# Patient Record
Sex: MALE | Race: WHITE | Employment: OTHER | URBAN - METROPOLITAN AREA
[De-identification: names, ages, dates, MRNs, and addresses within clinical notes are randomized per-mention and may not be internally consistent; named-entity substitution may affect disease eponyms.]

---

## 2017-09-26 ENCOUNTER — OFFICE VISIT (OUTPATIENT)
Dept: FAMILY MEDICINE CLINIC | Age: 67
End: 2017-09-26

## 2017-09-26 ENCOUNTER — HOSPITAL ENCOUNTER (OUTPATIENT)
Dept: LAB | Age: 67
Discharge: HOME | End: 2017-09-26
Payer: MEDICARE

## 2017-09-26 VITALS
BODY MASS INDEX: 25.36 KG/M2 | RESPIRATION RATE: 18 BRPM | WEIGHT: 197.6 LBS | HEIGHT: 74 IN | OXYGEN SATURATION: 97 % | TEMPERATURE: 96.8 F | SYSTOLIC BLOOD PRESSURE: 134 MMHG | HEART RATE: 75 BPM | DIASTOLIC BLOOD PRESSURE: 93 MMHG

## 2017-09-26 DIAGNOSIS — H69.82 EUSTACHIAN TUBE DYSFUNCTION, LEFT: ICD-10-CM

## 2017-09-26 DIAGNOSIS — Z71.89 ACP (ADVANCE CARE PLANNING): ICD-10-CM

## 2017-09-26 DIAGNOSIS — Z13.0 SCREENING FOR ENDOCRINE, METABOLIC AND IMMUNITY DISORDER: ICD-10-CM

## 2017-09-26 DIAGNOSIS — R35.1 NOCTURIA: ICD-10-CM

## 2017-09-26 DIAGNOSIS — Z23 ENCOUNTER FOR IMMUNIZATION: ICD-10-CM

## 2017-09-26 DIAGNOSIS — Z13.228 SCREENING FOR ENDOCRINE, METABOLIC AND IMMUNITY DISORDER: ICD-10-CM

## 2017-09-26 DIAGNOSIS — Z12.11 SCREEN FOR COLON CANCER: ICD-10-CM

## 2017-09-26 DIAGNOSIS — Z13.29 SCREENING FOR ENDOCRINE, METABOLIC AND IMMUNITY DISORDER: ICD-10-CM

## 2017-09-26 DIAGNOSIS — Z00.00 ENCOUNTER FOR MEDICARE ANNUAL WELLNESS EXAM: Primary | ICD-10-CM

## 2017-09-26 DIAGNOSIS — Z11.59 ENCOUNTER FOR HEPATITIS C SCREENING TEST FOR LOW RISK PATIENT: ICD-10-CM

## 2017-09-26 PROCEDURE — 84153 ASSAY OF PSA TOTAL: CPT

## 2017-09-26 PROCEDURE — 86803 HEPATITIS C AB TEST: CPT

## 2017-09-26 PROCEDURE — 36415 COLL VENOUS BLD VENIPUNCTURE: CPT

## 2017-09-26 PROCEDURE — 80061 LIPID PANEL: CPT

## 2017-09-26 PROCEDURE — 85027 COMPLETE CBC AUTOMATED: CPT

## 2017-09-26 PROCEDURE — 80053 COMPREHEN METABOLIC PANEL: CPT

## 2017-09-26 NOTE — PROGRESS NOTES
Angel Bazan is a 75 y.o. male who was seen in clinic today (9/26/2017). Subjective:  Cardiovascular Review:  The patient has no known cardiovascular conditions. Diet and Lifestyle: generally follows a low fat low cholesterol diet, generally follows a low sodium diet, exercises sporadically, smoker cigar once per month  Home BP Monitoring: is not measured at home. Pertinent ROS: taking medications as instructed, no medication side effects noted, no TIA's, no chest pain on exertion, no dyspnea on exertion, no swelling of ankles. Patient currently seen by ENT for left Eustachian tube dysfunction. Underwent left tympanostomy tube in 5/2017 with some relief. Prior to Admission medications    Medication Sig Start Date End Date Taking? Authorizing Provider   CHLORPHENIRAMINE/PHENYLEPHRINE (SINUS AND ALLERGY PE PO) Take  by mouth. Yes Historical Provider   pneumococcal 13 john conj dip (PREVNAR-13) 0.5 mL syrg injection 0.5 mL by IntraMUSCular route once for 1 dose. 9/26/17 9/26/17 Yes Lester Young NP   varicella zoster vacine live (VARICELLA-ZOSTER VACINE LIVE) 19,400 unit/0.65 mL susr injection 1 Vial by SubCUTAneous route once for 1 dose. 9/26/17 9/26/17 Yes Lester Young NP   fluticasone (FLONASE) 50 mcg/actuation nasal spray 2 Sprays by Both Nostrils route daily. 5/3/16  Yes Lester Young NP   guaiFENesin ER (MUCINEX) 600 mg ER tablet Take 600 mg by mouth two (2) times a day. Historical Provider   aspirin delayed-release 81 mg tablet Take 81 mg by mouth daily. Historical Provider          No Known Allergies        Review of Systems   Constitutional: Negative for malaise/fatigue and weight loss. HENT: Negative for hearing loss. Eyes: Negative for blurred vision. Respiratory: Negative for shortness of breath. Cardiovascular: Negative for chest pain, palpitations and leg swelling. Gastrointestinal: Negative for abdominal pain, constipation, diarrhea and heartburn. Genitourinary: Negative for frequency and urgency. Musculoskeletal: Negative for back pain, joint pain and myalgias. Neurological: Negative for dizziness, weakness and headaches. Endo/Heme/Allergies: Does not bruise/bleed easily. Psychiatric/Behavioral: Negative for depression. Objective:   Physical Exam   Constitutional: He is oriented to person, place, and time. He appears well-developed and well-nourished. No distress. HENT:   Right Ear: Tympanic membrane and ear canal normal.   Left Ear: Tympanic membrane normal. A foreign body (tympanostomy tube noted in canal) is present. Nose: No mucosal edema. Right sinus exhibits no maxillary sinus tenderness and no frontal sinus tenderness. Left sinus exhibits no maxillary sinus tenderness and no frontal sinus tenderness. Mouth/Throat: Oropharynx is clear and moist.   Eyes: EOM are normal. Pupils are equal, round, and reactive to light. Neck: Normal range of motion. Neck supple. No JVD present. Carotid bruit is not present. No thyromegaly present. Cardiovascular: Normal rate, regular rhythm, normal heart sounds and intact distal pulses. Exam reveals no gallop and no friction rub. No murmur heard. Pulmonary/Chest: Effort normal and breath sounds normal. No respiratory distress. He has no decreased breath sounds. He has no wheezes. He has no rhonchi. Abdominal: Soft. Bowel sounds are normal. He exhibits no distension. There is no tenderness. Musculoskeletal: He exhibits no edema. Lymphadenopathy:     He has no cervical adenopathy. Neurological: He is alert and oriented to person, place, and time. Psychiatric: He has a normal mood and affect. His behavior is normal.   Nursing note and vitals reviewed.         Visit Vitals    BP (!) 134/93 (BP 1 Location: Left arm, BP Patient Position: Sitting)    Pulse 75    Temp 96.8 °F (36 °C) (Oral)    Resp 18    Ht 6' 2\" (1.88 m)    Wt 197 lb 9.6 oz (89.6 kg)    SpO2 97%    BMI 25.37 kg/m2 Assessment & Plan:  Diagnoses and all orders for this visit:    1. Encounter for Medicare annual wellness exam    2. Eustachian tube dysfunction, left  Managed by ENT, no changes. 3. Screening for endocrine, metabolic and immunity disorder  -     CBC W/O DIFF  -     METABOLIC PANEL, COMPREHENSIVE  -     LIPID PANEL    4. Nocturia  -     PSA W/ REFLX FREE PSA    5. Encounter for hepatitis C screening test for low risk patient  -     HEPATITIS C AB    6. Screen for colon cancer  Discussed need for colonoscopy as a screening for colon cancer. Patient to schedule. 7. Encounter for immunization  -     pneumococcal 13 john conj dip (PREVNAR-13) 0.5 mL syrg injection; 0.5 mL by IntraMUSCular route once for 1 dose.  -     varicella zoster vacine live (VARICELLA-ZOSTER VACINE LIVE) 19,400 unit/0.65 mL susr injection; 1 Vial by SubCUTAneous route once for 1 dose. -     Influenza virus vaccine (FLUZONE HIGH-DOSE) 65 years and older (40165)  -     ADMIN PNEUMOCOCCAL VACCINE  Medicare Injection Admin Charge    8. ACP (advance care planning)  Patient has a living will, recommended adding a copy to medical record      I have discussed the diagnosis with the patient and the intended plan as seen in the above orders. The patient has received an after-visit summary along with patient information handout. I have discussed medication side effects and warnings with the patient as well. Follow-up Disposition:  Return in about 1 year (around 9/26/2018) for Annual Exam - 30 minutes.         Halina Cantu NP

## 2017-09-26 NOTE — PROGRESS NOTES
Jerilynn Dakin is a 75 y.o. male who presents for routine immunizations. He denies any symptoms , reactions or allergies that would exclude them from being immunized today. Risks and adverse reactions were discussed and the VIS was given to them. All questions were addressed. He was observed for 15 min post injection. There were no reactions observed.     Angel Nichols LPN

## 2017-09-26 NOTE — PATIENT INSTRUCTIONS

## 2017-09-26 NOTE — PROGRESS NOTES
Alexis Norman is a 75 y.o. male and presents for annual Medicare Wellness Visit. Problem List: Reviewed with patient and discussed risk factors. Patient Active Problem List   Diagnosis Code    AR (allergic rhinitis) J30.9    Anxiety disorder F41.9    Right inguinal hernia K40.90    ACP (advance care planning) Z71.89       Current medical providers:  Patient Care Team:  Crystal Gardner NP as PCP - General (Nurse Practitioner)    [address]: Reviewed with patient  Past Surgical History:   Procedure Laterality Date    ENDOSCOPY, COLON, DIAGNOSTIC  2007    repeat 2014    HX APPENDECTOMY  1953         HX HERNIA REPAIR  2/85    HX KNEE ARTHROSCOPY  6/02    HX ORTHOPAEDIC  10/75    knee repair        SH: Reviewed with patient  Social History   Substance Use Topics    Smoking status: Former Smoker     Years: 10.00     Types: Cigars    Smokeless tobacco: Never Used      Comment: off and on    Alcohol use Yes       FH: Reviewed with patient  Family History   Problem Relation Age of Onset    Heart Disease Mother      CAD s/p CABG 66's    Cancer Father     Alcohol abuse Father        Medications/Allergies: Reviewed with patient  Current Outpatient Prescriptions on File Prior to Visit   Medication Sig Dispense Refill    fluticasone (FLONASE) 50 mcg/actuation nasal spray 2 Sprays by Both Nostrils route daily. 1 Bottle 11    guaiFENesin ER (MUCINEX) 600 mg ER tablet Take 600 mg by mouth two (2) times a day.  aspirin delayed-release 81 mg tablet Take 81 mg by mouth daily. No current facility-administered medications on file prior to visit. No Known Allergies    Objective:  Visit Vitals    BP (!) 134/93 (BP 1 Location: Left arm, BP Patient Position: Sitting)    Pulse 75    Temp 96.8 °F (36 °C) (Oral)    Resp 18    Ht 6' 2\" (1.88 m)    Wt 197 lb 9.6 oz (89.6 kg)    SpO2 97%    BMI 25.37 kg/m2    Body mass index is 25.37 kg/(m^2).     Assessment of cognitive impairment: Alert and oriented x 3    Depression Screen:   PHQ over the last two weeks 9/26/2017   Little interest or pleasure in doing things Not at all   Feeling down, depressed or hopeless Not at all   Total Score PHQ 2 0       Fall Risk Assessment:    Fall Risk Assessment, last 12 mths 9/26/2017   Able to walk? Yes   Fall in past 12 months? No       Functional Ability:   Does the patient exhibit a steady gait? yes   How long did it take the patient to get up and walk from a sitting position? 1 sec   Is the patient self reliant?  (ie can do own laundry, meals, household chores)  yes     Does the patient handle his/her own medications? yes     Does the patient handle his/her own money? yes     Is the patients home safe (ie good lighting, handrails on stairs and bath, etc.)? yes     Did you notice or did patient express any hearing difficulties? no     Did you notice or did patient express any vision difficulties?   no     Were distance and reading eye charts used?   no       Advance Care Planning:   Patient was offered the opportunity to discuss advance care planning:  yes     Does patient have an Advance Directive:  yes   If no, did you provide information on Caring Connections?  no       Plan:      Orders Placed This Encounter    Influenza virus vaccine (FLUZONE HIGH-DOSE) 65 years and older ([id number])    CBC W/O DIFF    METABOLIC PANEL, COMPREHENSIVE    LIPID PANEL    PSA W/ REFLX FREE PSA    HEPATITIS C AB    ADMIN PNEUMOCOCCAL VACCINE  Medicare Injection Admin Charge    CHLORPHENIRAMINE/PHENYLEPHRINE (SINUS AND ALLERGY PE PO)    pneumococcal 13 john conj dip (PREVNAR-13) 0.5 mL syrg injection    varicella zoster vacine live (VARICELLA-ZOSTER VACINE LIVE) 19,400 unit/0.65 mL susr injection       Health Maintenance   Topic Date Due    Hepatitis C Screening  1950    ZOSTER VACCINE AGE 60>  01/22/2009    GLAUCOMA SCREENING Q2Y  03/22/2014    Pneumococcal 65+ Low/Medium Risk (2 of 2 - PCV13) 10/03/2015    COLONOSCOPY  01/01/2017    MEDICARE YEARLY EXAM  05/04/2017    INFLUENZA AGE 9 TO ADULT  08/01/2017    DTaP/Tdap/Td series (2 - Td) 09/18/2019       *Patient verbalized understanding and agreement with the plan. A copy of the After Visit Summary with personalized health plan was given to the patient today.

## 2017-09-26 NOTE — MR AVS SNAPSHOT
Visit Information Date & Time Provider Department Dept. Phone Encounter #  
 9/26/2017 10:30 AM Washington Lomeli NP [facility] [phone] [id number] Follow-up Instructions Return in about 1 year (around 9/26/2018) for Annual Exam - 30 minutes. Upcoming Health Maintenance Date Due Hepatitis C Screening 1950 ZOSTER VACCINE AGE 60> 1/22/2009 GLAUCOMA SCREENING Q2Y 3/22/2014 Pneumococcal 65+ Low/Medium Risk (2 of 2 - PCV13) 10/3/2015 COLONOSCOPY 1/1/2017 MEDICARE YEARLY EXAM 5/4/2017 INFLUENZA AGE 9 TO ADULT 8/1/2017 DTaP/Tdap/Td series (2 - Td) 9/18/2019 Allergies as of 9/26/2017  Review Complete On: 9/26/2017 By: Washington Lomeli NP No Known Allergies Current Immunizations  Never Reviewed Name Date Pneumococcal Polysaccharide (PPSV-23) 10/3/2014 TDAP Vaccine 9/18/2009 Not reviewed this visit You Were Diagnosed With   
  
 Codes Comments Encounter for Medicare annual wellness exam    -  Primary ICD-10-CM: Z00.00 ICD-9-CM: V70.0 Eustachian tube dysfunction, left     ICD-10-CM: B79.65 ICD-9-CM: 381.81 Screening for endocrine, metabolic and immunity disorder     ICD-10-CM: Z13.29, Z13.228, Z13.0 ICD-9-CM: V77.99 Nocturia     ICD-10-CM: R35.1 ICD-9-CM: 788.43 Vitals BP Pulse Temp Resp Height(growth percentile) Weight(growth percentile) (!) 134/93 (BP 1 Location: Left arm, BP Patient Position: Sitting) 75 96.8 °F (36 °C) (Oral) 18 6' 2\" (1.88 m) 197 lb 9.6 oz (89.6 kg) SpO2 BMI Smoking Status 97% 25.37 kg/m2 Former Smoker Vitals History BMI and BSA Data Body Mass Index Body Surface Area  
 25.37 kg/m 2 2.16 m 2 Preferred Pharmacy Pharmacy Name Phone [id number] [place], [address] [facility] [phone] Your Updated Medication List  
  
   
 This list is accurate as of: 9/26/17 11:41 AM.  Always use your most recent med list.  
  
  
  
  
 aspirin delayed-release 81 mg tablet Take 81 mg by mouth daily. fluticasone 50 mcg/actuation nasal spray Commonly known as:  Wenda Maze 2 Sprays by Both Nostrils route daily. guaiFENesin  mg ER tablet Commonly known as:  Claude & Claude Take 600 mg by mouth two (2) times a day. SINUS AND ALLERGY PE PO Take  by mouth. We Performed the Following CBC W/O DIFF [89040 CPT(R)] LIPID PANEL [23525 CPT(R)] METABOLIC PANEL, COMPREHENSIVE [33013 CPT(R)] PSA W/ REFLX FREE PSA [87980 CPT(R)] Follow-up Instructions Return in about 1 year (around 9/26/2018) for Annual Exam - 30 minutes. Patient Instructions Well Visit, Over 72: Care Instructions Your Care Instructions Physical exams can help you stay healthy. Your doctor has checked your overall health and may have suggested ways to take good care of yourself. He or she also may have recommended tests. At home, you can help prevent illness with healthy eating, regular exercise, and other steps. Follow-up care is a key part of your treatment and safety. Be sure to make and go to all appointments, and call your doctor if you are having problems. It's also a good idea to know your test results and keep a list of the medicines you take. How can you care for yourself at home? · Reach and stay at a healthy weight. This will lower your risk for many problems, such as obesity, diabetes, heart disease, and high blood pressure. · Get at least 30 minutes of exercise on most days of the week. Walking is a good choice. You also may want to do other activities, such as running, swimming, cycling, or playing tennis or team sports. · Do not smoke. Smoking can make health problems worse. If you need help quitting, talk to your doctor about stop-smoking programs and medicines. These can increase your chances of quitting for good. · Protect your skin from too much sun. When you're outdoors from 10 a.m. to 4 p.m., stay in the shade or cover up with clothing and a hat with a wide brim. Wear sunglasses that block UV rays. Even when it's cloudy, put broad-spectrum sunscreen (SPF 30 or higher) on any exposed skin. · See a dentist one or two times a year for checkups and to have your teeth cleaned. · Wear a seat belt in the car. · Limit alcohol to 2 drinks a day for men and 1 drink a day for women. Too much alcohol can cause health problems. Follow your doctor's advice about when to have certain tests. These tests can spot problems early. For men and women · Cholesterol. Your doctor will tell you how often to have this done based on your overall health and other things that can increase your risk for heart attack and stroke. · Blood pressure. Have your blood pressure checked during a routine doctor visit. Your doctor will tell you how often to check your blood pressure based on your age, your blood pressure results, and other factors. · Diabetes. Ask your doctor whether you should have tests for diabetes. · Vision. Experts recommend that you have yearly exams for glaucoma and other age-related eye problems. · Hearing. Tell your doctor if you notice any change in your hearing. You can have tests to find out how well you hear. · Colon cancer tests. Keep having colon cancer tests as your doctor recommends. You can have one of several types of tests. · Heart attack and stroke risk. At least every 4 to 6 years, you should have your risk for heart attack and stroke assessed. Your doctor uses factors such as your age, blood pressure, cholesterol, and whether you smoke or have diabetes to show what your risk for a heart attack or stroke is over the next 10 years. · Osteoporosis.  Talk to your doctor about whether you should have a bone density test to find out whether you have thinning bones. Also ask your doctor about whether you should take calcium and vitamin D supplements. For women · Pap test and pelvic exam. You may no longer need a Pap test. Talk with your doctor about whether to stop or continue to have Pap tests. · Breast exam and mammogram. Ask how often you should have a mammogram, which is an X-ray of your breasts. A mammogram can spot breast cancer before it can be felt and when it is easiest to treat. · Thyroid disease. Talk to your doctor about whether to have your thyroid checked as part of a regular physical exam. Women have an increased chance of a thyroid problem. For men · Prostate exam. Talk to your doctor about whether you should have a blood test (called a PSA test) for prostate cancer. Experts disagree on whether men should have this test. Some experts recommend that you discuss the benefits and risks of the test with your doctor. · Abdominal aortic aneurysm. Ask your doctor whether you should have a test to check for an aneurysm. You may need a test if you ever smoked or if your parent, brother, sister, or child has had an aneurysm. When should you call for help? Watch closely for changes in your health, and be sure to contact your doctor if you have any problems or symptoms that concern you. Where can you learn more? Go to http://allie-natalya.info/. Enter W150 in the search box to learn more about \"Well Visit, Over 65: Care Instructions. \" Current as of: July 19, 2016 Content Version: 11.3 © 1564-8681 [employer]. Care instructions adapted under license by [employer] (which disclaims liability or warranty for this information). If you have questions about a medical condition or this instruction, always ask your healthcare professional. [place] [address] any warranty or liability for your use of this information. Introducing [facility]! Dear Pedro Dejesus: Thank you for requesting a TV[employer] account. Our records indicate that you have previously registered for a TVTY account but its currently inactive. Please call our TVTY support line at [phone]. Additional Information If you have questions, please visit the Frequently Asked Questions section of the TV[employer] website at [email or web address] Remember, TVTY is NOT to be used for urgent needs. For medical emergencies, dial 911. Now available from your iPhone and Android! Please provide this summary of care documentation to your next provider. Your primary care clinician is listed as Jenn Richardson. If you have any questions after today's visit, please call [phone].

## 2017-09-27 LAB
ALBUMIN SERPL-MCNC: 4.8 G/DL (ref 3.6–4.8)
ALBUMIN/GLOB SERPL: 2.1 {RATIO} (ref 1.2–2.2)
ALP SERPL-CCNC: 70 IU/L (ref 39–117)
ALT SERPL-CCNC: 19 IU/L (ref 0–44)
AST SERPL-CCNC: 26 IU/L (ref 0–40)
BILIRUB SERPL-MCNC: 0.7 MG/DL (ref 0–1.2)
BUN SERPL-MCNC: 16 MG/DL (ref 8–27)
BUN/CREAT SERPL: 15 (ref 10–24)
CALCIUM SERPL-MCNC: 9.5 MG/DL (ref 8.6–10.2)
CHLORIDE SERPL-SCNC: 104 MMOL/L (ref 96–106)
CHOLEST SERPL-MCNC: 176 MG/DL (ref 100–199)
CO2 SERPL-SCNC: 25 MMOL/L (ref 18–29)
CREAT SERPL-MCNC: 1.07 MG/DL (ref 0.76–1.27)
ERYTHROCYTE [DISTWIDTH] IN BLOOD BY AUTOMATED COUNT: 14.2 % (ref 12.3–15.4)
GLOBULIN SER CALC-MCNC: 2.3 G/DL (ref 1.5–4.5)
GLUCOSE SERPL-MCNC: 89 MG/DL (ref 65–99)
HCT VFR BLD AUTO: 45.5 % (ref 37.5–51)
HCV AB S/CO SERPL IA: <0.1 S/CO RATIO (ref 0–0.9)
HDLC SERPL-MCNC: 60 MG/DL
HGB BLD-MCNC: 15.7 G/DL (ref 12.6–17.7)
INTERPRETATION, 910389: NORMAL
LDLC SERPL CALC-MCNC: 97 MG/DL (ref 0–99)
MCH RBC QN AUTO: 32 PG (ref 26.6–33)
MCHC RBC AUTO-ENTMCNC: 34.5 G/DL (ref 31.5–35.7)
MCV RBC AUTO: 93 FL (ref 79–97)
PLATELET # BLD AUTO: 260 X10E3/UL (ref 150–379)
POTASSIUM SERPL-SCNC: 4.8 MMOL/L (ref 3.5–5.2)
PROT SERPL-MCNC: 7.1 G/DL (ref 6–8.5)
PSA SERPL-MCNC: 3.2 NG/ML (ref 0–4)
RBC # BLD AUTO: 4.91 X10E6/UL (ref 4.14–5.8)
REFLEX CRITERIA: NORMAL
SODIUM SERPL-SCNC: 143 MMOL/L (ref 134–144)
TRIGL SERPL-MCNC: 94 MG/DL (ref 0–149)
VLDLC SERPL CALC-MCNC: 19 MG/DL (ref 5–40)
WBC # BLD AUTO: 8.3 X10E3/UL (ref 3.4–10.8)

## 2017-11-14 ENCOUNTER — HOSPITAL ENCOUNTER (OUTPATIENT)
Dept: MRI IMAGING | Age: 67
Discharge: HOME | End: 2017-11-14
Attending: SPECIALIST
Payer: MEDICARE

## 2017-11-14 VITALS — WEIGHT: 190 LBS | BODY MASS INDEX: 24.39 KG/M2

## 2017-11-14 DIAGNOSIS — H68.122: ICD-10-CM

## 2017-11-14 DIAGNOSIS — G51.0 BELL'S PALSY: ICD-10-CM

## 2017-11-14 DIAGNOSIS — H90.3 SENSORY HEARING LOSS, BILATERAL: ICD-10-CM

## 2017-11-14 PROCEDURE — 70553 MRI BRAIN STEM W/O & W/DYE: CPT

## 2017-11-14 PROCEDURE — A9576 INJ PROHANCE MULTIPACK: HCPCS | Performed by: SPECIALIST

## 2017-11-14 PROCEDURE — 74011250636 HC RX REV CODE- 250/636: Performed by: SPECIALIST

## 2017-11-14 RX ADMIN — GADOTERIDOL 18 ML: 279.3 INJECTION, SOLUTION INTRAVENOUS at 16:00

## 2018-07-31 ENCOUNTER — OFFICE VISIT (OUTPATIENT)
Dept: FAMILY MEDICINE CLINIC | Age: 68
End: 2018-07-31

## 2018-07-31 VITALS
WEIGHT: 190.6 LBS | SYSTOLIC BLOOD PRESSURE: 136 MMHG | HEIGHT: 74 IN | OXYGEN SATURATION: 98 % | RESPIRATION RATE: 18 BRPM | BODY MASS INDEX: 24.46 KG/M2 | TEMPERATURE: 98.5 F | HEART RATE: 84 BPM | DIASTOLIC BLOOD PRESSURE: 40 MMHG

## 2018-07-31 DIAGNOSIS — R33.9 INCOMPLETE BLADDER EMPTYING: Primary | ICD-10-CM

## 2018-07-31 LAB
BILIRUB UR QL STRIP: NEGATIVE
GLUCOSE UR-MCNC: NEGATIVE MG/DL
KETONES P FAST UR STRIP-MCNC: NEGATIVE MG/DL
PH UR STRIP: 7 [PH] (ref 4.6–8)
PROT UR QL STRIP: NEGATIVE
SP GR UR STRIP: 1.02 (ref 1–1.03)
UA UROBILINOGEN AMB POC: NORMAL (ref 0.2–1)
URINALYSIS CLARITY POC: CLEAR
URINALYSIS COLOR POC: YELLOW
URINE BLOOD POC: NEGATIVE
URINE LEUKOCYTES POC: NEGATIVE
URINE NITRITES POC: NEGATIVE

## 2018-07-31 RX ORDER — TAMSULOSIN HYDROCHLORIDE 0.4 MG/1
0.4 CAPSULE ORAL DAILY
Qty: 14 CAP | Refills: 0 | Status: SHIPPED | OUTPATIENT
Start: 2018-07-31 | End: 2018-08-06 | Stop reason: SDUPTHER

## 2018-07-31 RX ORDER — DIAZEPAM 5 MG/1
TABLET ORAL
Refills: 0 | COMMUNITY
Start: 2018-05-31 | End: 2018-07-31

## 2018-07-31 NOTE — MR AVS SNAPSHOT
[facility] 
 
 
 [address] 
[phone] Patient: Justina Harris MRN: [id number] FSD:2[birth year] Visit Information Date & Time Provider Department Dept. Phone Encounter #  
 7/31/2018  6:45 PM [facility] [place]  [place] [phone] [id number] Follow-up Instructions Return if symptoms worsen or fail to improve. Upcoming Health Maintenance Date Due ZOSTER VACCINE AGE 60> 1/22/2009 GLAUCOMA SCREENING Q2Y 3/22/2014 Pneumococcal 65+ Low/Medium Risk (2 of 2 - PCV13) 10/3/2015 COLONOSCOPY 1/1/2017 Influenza Age 5 to Adult 8/1/2018 MEDICARE YEARLY EXAM 9/27/2018 DTaP/Tdap/Td series (2 - Td) 9/18/2019 Allergies as of 7/31/2018  Review Complete On: 7/31/2018 By: Clinton Wright No Known Allergies Current Immunizations  Never Reviewed Name Date Influenza High Dose Vaccine PF 9/26/2017 Pneumococcal Polysaccharide (PPSV-23) 10/3/2014 TDAP Vaccine 9/18/2009 Not reviewed this visit You Were Diagnosed With   
  
 Codes Comments Incomplete bladder emptying    -  Primary ICD-10-CM: R33.9 ICD-9-CM: 788.21 Vitals BP Pulse Temp Resp Height(growth percentile) Weight(growth percentile) 136/40 (BP 1 Location: Left arm, BP Patient Position: Sitting) 84 98.5 °F (36.9 °C) (Oral) 18 6' 2\" (1.88 m) 190 lb 9.6 oz (86.5 kg) SpO2 BMI Smoking Status 98% 24.47 kg/m2 Former Smoker Vitals History BMI and BSA Data Body Mass Index Body Surface Area  
 24.47 kg/m 2 2.13 m 2 Preferred Pharmacy Pharmacy Name Phone [id number], [address] [facility] [phone] Your Updated Medication List  
  
   
This list is accurate as of 7/31/18  7:02 PM.  Always use your most recent med list.  
  
  
  
  
 aspirin delayed-release 81 mg tablet Take 81 mg by mouth daily. fluticasone 50 mcg/actuation nasal spray Commonly known as:  Leslie Gut 2 Sprays by Both Nostrils route daily. SINUS AND ALLERGY PE PO Take  by mouth. tamsulosin 0.4 mg capsule Commonly known as:  FLOMAX Take 1 Cap by mouth daily. Prescriptions Sent to Pharmacy Refills  
 tamsulosin (FLOMAX) 0.4 mg capsule 0 Sig: Take 1 Cap by mouth daily. Class: Normal  
 Pharmacy: [facility]/pharmacy #[id number] #: [phone] Route: Oral  
  
We Performed the Following AMB POC URINALYSIS DIP STICK MANUAL W/O MICRO [73645 CPT(R)] CULTURE, URINE [id number] CPT(R)] Follow-up Instructions Return if symptoms worsen or fail to improve. Patient Instructions Urinary Retention: Care Instructions Your Care Instructions Urinary retention means that you aren't able to urinate. In men, it is often caused by a blockage of the urinary tract from an enlarged prostate gland. In men and women, it can also be caused by an infection or nerve damage. Or it may be a side effect of a medicine. The doctor may have drained the urine from your bladder. If you still have problems passing urine, you may need to use a catheter at home. This is used to empty your bladder until the problem can be fixed. Your doctor may put a catheter in your bladder before you go home. If so, he or she will tell you when to come back to have the catheter removed. The doctor has checked you closely. But problems can develop later. If you notice any problems or new symptoms, get medical treatment right away. Follow-up care is a key part of your treatment and safety. Be sure to make and go to all appointments, and call your doctor if you are having problems. It's also a good idea to know your test results and keep a list of the medicines you take. How can you care for yourself at home? · Take your medicines exactly as prescribed. Call your doctor if you think you are having a problem with your medicine. You will get more details on the specific medicines your doctor prescribes. · Check with your doctor before you use any over-the-counter medicines. Many cold and allergy medicines, for example, can make this problem worse. Make sure your doctor knows all of the medicines, vitamins, supplements, and herbal remedies you take. · Spread out through the day the amount of fluid you drink. Do not drink a lot at bedtime. · Avoid alcohol and caffeine. · If you have been given a catheter, or if one is already in place, follow the instructions you were given. Always wash your hands before and after you handle the catheter. When should you call for help? Call your doctor now or seek immediate medical care if: 
  · You cannot urinate at all, or it is getting harder to urinate.  
  · You have symptoms of a urinary tract infection. These may include: 
¨ Pain or burning when you urinate. ¨ A frequent need to urinate without being able to pass much urine. ¨ Pain in the flank, which is just below the rib cage and above the waist on either side of the back. ¨ Blood in your urine. ¨ A fever.  
 Watch closely for changes in your health, and be sure to contact your doctor if: 
  · You have any problems with your catheter.  
  · You do not get better as expected. Where can you learn more? Go to http://allie-natalya.info/. Enter M244 in the search box to learn more about \"Urinary Retention: Care Instructions. \" Current as of: May 12, 2017 Content Version: 11.7 © 4455-8960 [employer]. Care instructions adapted under license by "[employer]" (which disclaims liability or warranty for this information).  If you have questions about a medical condition or this instruction, always ask your healthcare professional. Mariela Conce[employer] disclaims any warranty or liability for your use of this information. Introducing [facility]! [employer] introduces Makstr patient portal. Now you can access parts of your medical record, email your doctor's office, and request medication refills online. 1. In your internet browser, go to [email or web address] 2. Click on the First Time User? Click Here link in the Sign In box. You will see the New Member Sign Up page. 3. Enter your Calleoot Access Code exactly as it appears below. You will not need to use this code after youve completed the sign-up process. If you do not sign up before the expiration date, you must request a new code. · Makstr Access Code: NOEL Savage RAFAEL Expires: 10/29/2018  6:59 PM 
 
4. Enter the last four digits of your Social Security Number (xxxx) and Date of Birth (mm/dd/yyyy) as indicated and click Submit. You will be taken to the next sign-up page. 5. Create a Makstr ID. This will be your Makstr login ID and cannot be changed, so think of one that is secure and easy to remember. 6. Create a Makstr password. You can change your password at any time. 7. Enter your Password Reset Question and Answer. This can be used at a later time if you forget your password. 8. Enter your e-mail address. You will receive e-mail notification when new information is available in [address] 9. Click Sign Up. You can now view and download portions of your medical record. 10. Click the Download Summary menu link to download a portable copy of your medical information. If you have questions, please visit the Frequently Asked Questions section of the Makstr website. Remember, Makstr is NOT to be used for urgent needs. For medical emergencies, dial 911. Now available from your iPhone and Android! Please provide this summary of care documentation to your next provider. Your primary care clinician is listed as Basilio Healy. If you have any questions after today's visit, please call [phone].

## 2018-07-31 NOTE — PROGRESS NOTES
Patient Name: Chriss Sandhoff   MRN: [id number]    Marnie Cabezas is a 70 y.o. male who presents with the following:     Patient states that since this morning, he has had incomplete bladder emptying, urinary frequency, urgency and a weak stream.  Prior to today, he states he is always had a strong urinary stream and will urinate about 3 or 4 times a day. Is a  therefore does not drink a lot of water. Does take an over-the-counter antihistamine for allergies but denies any recent changes. Denies history of kidney stones, UTIs, pain, fever, nausea, vomiting, stomach pain. He is concerned as his father-in-law many years ago required to go to the emergency room for Arreaga catheter placement after prolonged period of urinary retention. Had his prostate checked with normal rectal exam and PSA in September 2017. Has never had prostate issues. Review of Systems   Constitutional: Negative for fever, malaise/fatigue and weight loss. Respiratory: Negative for cough, hemoptysis, shortness of breath and wheezing. Cardiovascular: Negative for chest pain, palpitations, leg swelling and PND. Gastrointestinal: Negative for abdominal pain, constipation, diarrhea, nausea and vomiting. Genitourinary: Positive for frequency and urgency. Negative for dysuria, flank pain and hematuria. The patient's medications, allergies, past medical history, surgical history, family history and social history were reviewed and updated where appropriate. Prior to Admission medications    Medication Sig Start Date End Date Taking? Authorizing Provider   CHLORPHENIRAMINE/PHENYLEPHRINE (SINUS AND ALLERGY PE PO) Take  by mouth. Yes Historical Provider   fluticasone (FLONASE) 50 mcg/actuation nasal spray 2 Sprays by Both Nostrils route daily. 5/3/16  Yes Caitlyn Miranda NP   aspirin delayed-release 81 mg tablet Take 81 mg by mouth daily.    Yes Historical Provider       No Known Allergies        OBJECTIVE    Visit Vitals    /40 (BP 1 Location: Left arm, BP Patient Position: Sitting)    Pulse 84    Temp 98.5 °F (36.9 °C) (Oral)    Resp 18    Ht 6' 2\" (1.88 m)    Wt 190 lb 9.6 oz (86.5 kg)    SpO2 98%    BMI 24.47 kg/m2       Physical Exam   Constitutional: He is oriented to person, place, and time and well-developed, well-nourished, and in no distress. No distress. Eyes: Conjunctivae and EOM are normal. Pupils are equal, round, and reactive to light. Cardiovascular: Normal rate, regular rhythm and normal heart sounds. Exam reveals no gallop and no friction rub. No murmur heard. Pulmonary/Chest: Effort normal and breath sounds normal. No respiratory distress. He has no wheezes. Abdominal: Soft. Bowel sounds are normal. He exhibits no distension and no mass. There is no tenderness. There is no rebound and no guarding. Neurological: He is alert and oriented to person, place, and time. Skin: Skin is warm and dry. No rash noted. He is not diaphoretic. Psychiatric: Mood, memory, affect and judgment normal.   Nursing note and vitals reviewed. ASSESSMENT AND PLAN  Judy Sellers is a 70 y.o. male who presents today for:    1. Incomplete bladder emptying  UA is within normal limits. Suspect possible bladder infection but will obtain urine culture to confirm. Recommend patient to stop antihistamine as this may further worsen urinary retention. May trial Flomax to see if it helps with urinary stream.  Discussed that if he goes for prolonged period of time without any urinary emptying, he should proceed to the ER for urgent Arreaga catheter placement. Reviewed signs and symptoms that would indicate a worsening medical condition which would require immediate evaluation and treatment; patient expressed understanding of plan. - CULTURE, URINE  - AMB POC URINALYSIS DIP STICK MANUAL W/O MICRO  - tamsulosin (FLOMAX) 0.4 mg capsule; Take 1 Cap by mouth daily. Dispense: 14 Cap; Refill: 0       Medications Discontinued During This Encounter   Medication Reason    diazePAM (VALIUM) 5 mg tablet Not A Current Medication    guaiFENesin ER (MUCINEX) 600 mg ER tablet Not A Current Medication       Follow-up Disposition:  Return if symptoms worsen or fail to improve. Medication risks/benefits/costs/interactions/alternatives discussed with patient. Advised patient to call back or return to office if symptoms worsen/change/persist. If patient cannot reach us or should anything more severe/urgent arise he/she should proceed directly to the nearest emergency department. Discussed expected course/resolution/complications of diagnosis in detail with patient. Patient given a written after visit summary which includes his/her diagnoses, current medications and vitals. Patient expressed understanding with the diagnosis and plan.      Floyd Bush M.D.

## 2018-07-31 NOTE — PROGRESS NOTES
Chief Complaint   Patient presents with    Incomplete Bladder Emptying     since this morning       1. Have you been to the ER, urgent care clinic since your last visit? Hospitalized since your last visit? No    2. Have you seen or consulted any other health care providers outside of the [address] [place] [address] since your last visit? Include any pap smears or colon screening.  No

## 2018-07-31 NOTE — PATIENT INSTRUCTIONS
Urinary Retention: Care Instructions  Your Care Instructions    Urinary retention means that you aren't able to urinate. In men, it is often caused by a blockage of the urinary tract from an enlarged prostate gland. In men and women, it can also be caused by an infection or nerve damage. Or it may be a side effect of a medicine. The doctor may have drained the urine from your bladder. If you still have problems passing urine, you may need to use a catheter at home. This is used to empty your bladder until the problem can be fixed. Your doctor may put a catheter in your bladder before you go home. If so, he or she will tell you when to come back to have the catheter removed. The doctor has checked you closely. But problems can develop later. If you notice any problems or new symptoms, get medical treatment right away. Follow-up care is a key part of your treatment and safety. Be sure to make and go to all appointments, and call your doctor if you are having problems. It's also a good idea to know your test results and keep a list of the medicines you take. How can you care for yourself at home? · Take your medicines exactly as prescribed. Call your doctor if you think you are having a problem with your medicine. You will get more details on the specific medicines your doctor prescribes. · Check with your doctor before you use any over-the-counter medicines. Many cold and allergy medicines, for example, can make this problem worse. Make sure your doctor knows all of the medicines, vitamins, supplements, and herbal remedies you take. · Spread out through the day the amount of fluid you drink. Do not drink a lot at bedtime. · Avoid alcohol and caffeine. · If you have been given a catheter, or if one is already in place, follow the instructions you were given. Always wash your hands before and after you handle the catheter. When should you call for help?   Call your doctor now or seek immediate medical care if:    · You cannot urinate at all, or it is getting harder to urinate.     · You have symptoms of a urinary tract infection. These may include:  ¨ Pain or burning when you urinate. ¨ A frequent need to urinate without being able to pass much urine. ¨ Pain in the flank, which is just below the rib cage and above the waist on either side of the back. ¨ Blood in your urine. ¨ A fever.    Watch closely for changes in your health, and be sure to contact your doctor if:    · You have any problems with your catheter.     · You do not get better as expected. Where can you learn more? Go to http://allie-natalya.info/. Enter M244 in the search box to learn more about \"Urinary Retention: Care Instructions. \"  Current as of: May 12, 2017  Content Version: 11.7  © 3695-1785 [employer]. Care instructions adapted under license by [employer] (which disclaims liability or warranty for this information). If you have questions about a medical condition or this instruction, always ask your healthcare professional. [place] [address] any warranty or liability for your use of this information.

## 2018-08-01 ENCOUNTER — HOSPITAL ENCOUNTER (OUTPATIENT)
Dept: LAB | Age: 68
Discharge: HOME | End: 2018-08-01
Payer: MEDICARE

## 2018-08-01 PROCEDURE — 87086 URINE CULTURE/COLONY COUNT: CPT

## 2018-08-02 ENCOUNTER — TELEPHONE (OUTPATIENT)
Dept: FAMILY MEDICINE CLINIC | Age: 68
End: 2018-08-02

## 2018-08-02 LAB — BACTERIA UR CULT: NO GROWTH

## 2018-08-02 NOTE — TELEPHONE ENCOUNTER
Call to patient.  verified. Patient informed me he feels a lot better. His symptoms are decreasing after taking the prescribed medication. Informed him we will call him once his urine culture comes back.   He states he will come in to have his prostate check

## 2018-08-02 NOTE — PROGRESS NOTES
Please notify patient regarding their test results:    Urine culture is negative for infection. Pt to continue with the Flomax as he may have signs of an enlarged prostate. Stop antihistamines as discussed. Pt is welcome to follow up with PCP and/or urology to discuss further. Would recommend continue with Flomax for now and monitor for any worsening signs/symptoms.

## 2018-08-02 NOTE — TELEPHONE ENCOUNTER
----- Message from Aris Root sent at 8/1/2018  5:24 PM EDT -----  Regarding: Dr. Michael Langford: [phone]  Patient is requesting to speak with Dr. Maranda Do in regards to his appointment from yesterday. Patient stated that he has been urinating more frequently and experiencing an increase in urges to urinate. In addition, patient stated that he has been experiencing a bit of discomfort while using the bathroom.

## 2018-08-03 NOTE — PROGRESS NOTES
Called patient informed him of the following. Urine culture is negative for infection. Pt to continue with the Flomax as he may have signs of an enlarged prostate. Stop antihistamines as discussed. Pt is welcome to follow up with PCP and/or urology to discuss further. Would recommend continue with Flomax for now and monitor for any worsening signs/symptoms. Patient will follow up with PCP.

## 2018-08-05 ENCOUNTER — HOSPITAL ENCOUNTER (EMERGENCY)
Age: 68
Discharge: HOME | End: 2018-08-05
Attending: EMERGENCY MEDICINE
Payer: MEDICARE

## 2018-08-05 VITALS
SYSTOLIC BLOOD PRESSURE: 125 MMHG | HEART RATE: 78 BPM | WEIGHT: 190 LBS | TEMPERATURE: 97.9 F | DIASTOLIC BLOOD PRESSURE: 75 MMHG | OXYGEN SATURATION: 95 % | BODY MASS INDEX: 25.18 KG/M2 | RESPIRATION RATE: 22 BRPM | HEIGHT: 73 IN

## 2018-08-05 DIAGNOSIS — R55 VASOVAGAL SYNCOPE: Primary | ICD-10-CM

## 2018-08-05 LAB
ALBUMIN SERPL-MCNC: 3.7 G/DL (ref 3.5–5)
ALBUMIN/GLOB SERPL: 1.3 {RATIO} (ref 1.1–2.2)
ALP SERPL-CCNC: 68 U/L (ref 45–117)
ALT SERPL-CCNC: 23 U/L (ref 12–78)
ANION GAP SERPL CALC-SCNC: 5 MMOL/L (ref 5–15)
APPEARANCE UR: CLEAR
AST SERPL-CCNC: 19 U/L (ref 15–37)
ATRIAL RATE: 84 BPM
BACTERIA URNS QL MICRO: NEGATIVE /HPF
BASOPHILS # BLD: 0.1 K/UL (ref 0–0.1)
BASOPHILS NFR BLD: 1 % (ref 0–1)
BILIRUB SERPL-MCNC: 0.5 MG/DL (ref 0.2–1)
BILIRUB UR QL: NEGATIVE
BUN SERPL-MCNC: 20 MG/DL (ref 6–20)
BUN/CREAT SERPL: 18 (ref 12–20)
CALCIUM SERPL-MCNC: 8.7 MG/DL (ref 8.5–10.1)
CALCULATED P AXIS, ECG09: 22 DEGREES
CALCULATED R AXIS, ECG10: -8 DEGREES
CALCULATED T AXIS, ECG11: 9 DEGREES
CHLORIDE SERPL-SCNC: 109 MMOL/L (ref 97–108)
CO2 SERPL-SCNC: 26 MMOL/L (ref 21–32)
COLOR UR: NORMAL
CREAT SERPL-MCNC: 1.09 MG/DL (ref 0.7–1.3)
DIAGNOSIS, 93000: NORMAL
DIFFERENTIAL METHOD BLD: ABNORMAL
EOSINOPHIL # BLD: 0.2 K/UL (ref 0–0.4)
EOSINOPHIL NFR BLD: 2 % (ref 0–7)
EPITH CASTS URNS QL MICRO: NORMAL /LPF
ERYTHROCYTE [DISTWIDTH] IN BLOOD BY AUTOMATED COUNT: 13.2 % (ref 11.5–14.5)
GLOBULIN SER CALC-MCNC: 2.9 G/DL (ref 2–4)
GLUCOSE SERPL-MCNC: 116 MG/DL (ref 65–100)
GLUCOSE UR STRIP.AUTO-MCNC: NEGATIVE MG/DL
HCT VFR BLD AUTO: 42.7 % (ref 36.6–50.3)
HGB BLD-MCNC: 14.3 G/DL (ref 12.1–17)
HGB UR QL STRIP: NEGATIVE
IMM GRANULOCYTES # BLD: 0 K/UL (ref 0–0.04)
IMM GRANULOCYTES NFR BLD AUTO: 1 % (ref 0–0.5)
KETONES UR QL STRIP.AUTO: NEGATIVE MG/DL
LEUKOCYTE ESTERASE UR QL STRIP.AUTO: NEGATIVE
LYMPHOCYTES # BLD: 2.1 K/UL (ref 0.8–3.5)
LYMPHOCYTES NFR BLD: 26 % (ref 12–49)
MCH RBC QN AUTO: 31.8 PG (ref 26–34)
MCHC RBC AUTO-ENTMCNC: 33.5 G/DL (ref 30–36.5)
MCV RBC AUTO: 95.1 FL (ref 80–99)
MONOCYTES # BLD: 0.7 K/UL (ref 0–1)
MONOCYTES NFR BLD: 9 % (ref 5–13)
NEUTS SEG # BLD: 4.8 K/UL (ref 1.8–8)
NEUTS SEG NFR BLD: 61 % (ref 32–75)
NITRITE UR QL STRIP.AUTO: NEGATIVE
NRBC # BLD: 0 K/UL (ref 0–0.01)
NRBC BLD-RTO: 0 PER 100 WBC
P-R INTERVAL, ECG05: 164 MS
PH UR STRIP: 5.5 [PH] (ref 5–8)
PLATELET # BLD AUTO: 214 K/UL (ref 150–400)
PMV BLD AUTO: 10.6 FL (ref 8.9–12.9)
POTASSIUM SERPL-SCNC: 3.6 MMOL/L (ref 3.5–5.1)
PROT SERPL-MCNC: 6.6 G/DL (ref 6.4–8.2)
PROT UR STRIP-MCNC: NEGATIVE MG/DL
Q-T INTERVAL, ECG07: 364 MS
QRS DURATION, ECG06: 88 MS
QTC CALCULATION (BEZET), ECG08: 430 MS
RBC # BLD AUTO: 4.49 M/UL (ref 4.1–5.7)
RBC #/AREA URNS HPF: NORMAL /HPF (ref 0–5)
SODIUM SERPL-SCNC: 140 MMOL/L (ref 136–145)
SP GR UR REFRACTOMETRY: 1.02 (ref 1–1.03)
TROPONIN I SERPL-MCNC: <0.05 NG/ML
UR CULT HOLD, URHOLD: NORMAL
UROBILINOGEN UR QL STRIP.AUTO: 0.2 EU/DL (ref 0.2–1)
VENTRICULAR RATE, ECG03: 84 BPM
WBC # BLD AUTO: 7.9 K/UL (ref 4.1–11.1)
WBC URNS QL MICRO: NORMAL /HPF (ref 0–4)

## 2018-08-05 PROCEDURE — 81001 URINALYSIS AUTO W/SCOPE: CPT | Performed by: EMERGENCY MEDICINE

## 2018-08-05 PROCEDURE — 93005 ELECTROCARDIOGRAM TRACING: CPT

## 2018-08-05 PROCEDURE — 36415 COLL VENOUS BLD VENIPUNCTURE: CPT | Performed by: EMERGENCY MEDICINE

## 2018-08-05 PROCEDURE — 99285 EMERGENCY DEPT VISIT HI MDM: CPT

## 2018-08-05 PROCEDURE — 84484 ASSAY OF TROPONIN QUANT: CPT | Performed by: EMERGENCY MEDICINE

## 2018-08-05 PROCEDURE — 80053 COMPREHEN METABOLIC PANEL: CPT | Performed by: EMERGENCY MEDICINE

## 2018-08-05 PROCEDURE — 85025 COMPLETE CBC W/AUTO DIFF WBC: CPT | Performed by: EMERGENCY MEDICINE

## 2018-08-05 NOTE — DISCHARGE INSTRUCTIONS
Vasovagal Syncope: Care Instructions  Your Care Instructions    Vasovagal syncope (say \"jod-uaq-POXBharti LAL-kuh-pee\")is sudden dizziness or fainting that can be set off by things such as pain, stress, fear, or trauma. You may sweat or feel lightheaded, sick to your stomach, or tingly. The problem causes the heart rate to slow and the blood vessels to widen, or dilate, for a short time. When this happens, blood pools in the lower body, and less blood goes to the brain. You can usually get relief by lying down with your legs raised (elevated). This helps more blood to flow to your brain and may help relieve symptoms like feeling dizzy. Some doctors may recommend a technique that involves tensing your fists and arms. This type of fainting is often easy to predict. For example, it happens to some people when they see blood or have to get a shot. They may feel symptoms before they faint. An episode of vasovagal syncope usually responds well to self-care. Other treatment often isn't needed. But if the fainting keeps happening, your doctor may suggest further treatments. Follow-up care is a key part of your treatment and safety. Be sure to make and go to all appointments, and call your doctor if you are having problems. It's also a good idea to know your test results and keep a list of the medicines you take. How can you care for yourself at home? · Drink plenty of fluids to prevent dehydration. If you have kidney, heart, or liver disease and have to limit fluids, talk with your doctor before you increase your fluid intake. · Try to avoid things that you think may set off vasovagal syncope. · Talk to your doctor about any medicines you take. Some medicines may increase the chance of this condition occurring. · If you feel symptoms, lie down with your legs raised. Talk to your doctor about what to do if your symptoms come back. When should you call for help?   Call 911 anytime you think you may need emergency care. For example, call if:    · You have symptoms of a heart problem. These may include:  ¨ Chest pain or pressure. ¨ Severe trouble breathing. ¨ A fast or irregular heartbeat.    Watch closely for changes in your health, and be sure to contact your doctor if:    · You have more episodes of fainting at home.     · You do not get better as expected. Where can you learn more? Go to http://allie-natalya.info/. Enter L754 in the search box to learn more about \"Vasovagal Syncope: Care Instructions. \"  Current as of: November 20, 2017  Content Version: 11.7  © 2143-0790 [employer]. Care instructions adapted under license by [employer] (which disclaims liability or warranty for this information). If you have questions about a medical condition or this instruction, always ask your healthcare professional. [address] any warranty or liability for your use of this information.

## 2018-08-05 NOTE — ED PROVIDER NOTES
HPI Comments: 70 y.o. male with past medical history significant for Arthritis, Eustachian tube dysfunction, Anxiety, Appendectomy, Hernia repair who presents to ED via EMS for evaluation s/p syncopal episode. Per spouse, the patient and herself were seated at a restaurant. Their food had just arrived at which the pt c/o feeling lightheaded. He attempted to stand up but \"his eyes rolled back and he crumbled to the ground\". Spouse caught him on the way down and lowered him. He was moderately diaphoretic and unresponsive. Spouse states that he was unresponsive for 2-3 minutes. Pt states that felt the symptoms come on. His vision tunneled. While in ED, pt states that he feels back to baseline. He felt fine upon waking this morning. Pt notes symptoms of urinary urgency this week. He was evaluated by a primary care and started on Flomax. Pt states that he took this medication days following office visit with relief. He did not take this medication yesterday. Prior to syncope today, he denies pain or urgency. Pt further denies hx of similar symptoms. NO chest pain, shortness of breath, neck pain, back pain, tinnitus, fevers, chills, numbness, weakness or current visual disturbance. There are no other acute medical concerns at this time. PCP: Lasha Flores NP    Note written by Sienna Way, as dictated by Azucena Barnett MD 10:38 AM    The history is provided by the patient. No  was used.         Past Medical History:   Diagnosis Date    Allergic rhinitis, cause unspecified     Anxiety state, unspecified     Degenerative arthritis of lumbar spine 9/12    Eustachian tube dysfunction        Past Surgical History:   Procedure Laterality Date    ENDOSCOPY, COLON, DIAGNOSTIC  2007    repeat 2014    HX APPENDECTOMY  1953         HX HERNIA REPAIR  2/85    HX KNEE ARTHROSCOPY  6/02    HX ORTHOPAEDIC  10/75    knee repair         Family History:   Problem Relation Age of Onset    Heart Disease Mother      CAD s/p CABG 66's    Cancer Father     Alcohol abuse Father        Social History     Social History    Marital status:      Spouse name: N/A    Number of children: N/A    Years of education: N/A     Occupational History     ed      Social History Main Topics    Smoking status: Former Smoker     Years: 10.00     Types: Cigars    Smokeless tobacco: Never Used      Comment: off and on    Alcohol use Yes    Drug use: No    Sexual activity: Yes     Partners: Female     Other Topics Concern     Service No    Blood Transfusions No    Caffeine Concern No    Occupational Exposure No    Hobby Hazards No    Sleep Concern No    Stress Concern No    Weight Concern No    Special Diet No    Back Care No    Exercise Yes     prn    Bike Helmet No    Seat Belt Yes    Self-Exams Yes     Social History Narrative         ALLERGIES: Review of patient's allergies indicates no known allergies. Review of Systems   Constitutional: Negative for chills and fever. Respiratory: Negative for shortness of breath. Cardiovascular: Negative for chest pain. Gastrointestinal: Negative for abdominal pain, nausea and vomiting. Genitourinary: Negative for difficulty urinating, dysuria and urgency. Musculoskeletal: Negative for back pain and neck pain. Neurological: Positive for syncope. Negative for dizziness, weakness, numbness and headaches. All other systems reviewed and are negative. Vitals:    08/05/18 1115 08/05/18 1130 08/05/18 1145 08/05/18 1200   BP: 123/74 127/78 115/78 125/75   Pulse: 78 75 71 78   Resp: 20 15 16 22   Temp:       SpO2: 96% 98% 96% 95%   Weight:       Height:                Physical Exam   Constitutional: He is oriented to person, place, and time. He appears well-developed and well-nourished. No distress. HENT:   Head: Normocephalic and atraumatic. Eyes: Conjunctivae are normal.   Neck: Neck supple. No tracheal deviation present. Cardiovascular: Normal rate, regular rhythm and normal heart sounds. Pulmonary/Chest: Effort normal and breath sounds normal. No respiratory distress. Abdominal: He exhibits no distension. There is no tenderness. There is no rebound and no guarding. Musculoskeletal: Normal range of motion. Neurological: He is alert and oriented to person, place, and time. No cranial nerve deficit. He exhibits normal muscle tone. Coordination normal.   Skin: Skin is warm and dry. No rash noted. No pallor. Psychiatric: He has a normal mood and affect. Nursing note and vitals reviewed. Note written by Sienna Garcia, as dictated by Daquan Celeste MD 10:45 AM    MDM    70 y.o. male presents with syncopal episode where he felt sweaty and lightheaded before losing consciousness at breakfast. No SHOB or other high risk features. Fully resolved and asymptomatic here. Low risk by SF rule. EKG reviewed by me is normal sinus rhythm and rate, without evidence of ST or T wave changes to suggest myocardial ischemia, no delta wave, no brugada, no prolonged QT to suggest arrhythmogenicity. Suspect he had a vagal event. Plan to follow up with PCP as needed and return precautions discussed for worsening or new concerning symptoms.      ED Course       Procedures    ED EKG interpretation:  Rhythm: normal sinus rhythm; Rate (approx.): 84; Axis: normal; ST/T wave: normal.   Note written by Sienna Garcia, as dictated by 10:29 AM

## 2018-08-05 NOTE — ED NOTES
Bedside and Verbal shift change report [facility] (oncoming nurse) by Tod Keller (offgoing nurse). Report included the following information SBAR, ED Summary, Intake/Output, MAR and Recent Results.

## 2018-08-05 NOTE — ED NOTES
Discharge instructions given to pt. All questions answered and pt verbalized understanding. V/S stable @ time of discharge. Pt ambulatory out of unit accompanied by wife.

## 2018-08-05 NOTE — ED TRIAGE NOTES
Pt was out to breakfast and started feeling lightheaded. Pt stood up and had syncopal event, lowered to floor. Denies hitting head. . Denies CP or SOB.

## 2018-08-06 ENCOUNTER — OFFICE VISIT (OUTPATIENT)
Dept: FAMILY MEDICINE CLINIC | Age: 68
End: 2018-08-06

## 2018-08-06 ENCOUNTER — HOSPITAL ENCOUNTER (OUTPATIENT)
Dept: LAB | Age: 68
Discharge: HOME | End: 2018-08-06
Payer: MEDICARE

## 2018-08-06 VITALS
SYSTOLIC BLOOD PRESSURE: 138 MMHG | HEART RATE: 73 BPM | WEIGHT: 191.8 LBS | RESPIRATION RATE: 18 BRPM | HEIGHT: 73 IN | TEMPERATURE: 98.4 F | OXYGEN SATURATION: 98 % | DIASTOLIC BLOOD PRESSURE: 80 MMHG | BODY MASS INDEX: 25.42 KG/M2

## 2018-08-06 DIAGNOSIS — Z87.898 HISTORY OF SYNCOPE: Primary | ICD-10-CM

## 2018-08-06 DIAGNOSIS — N40.0 BENIGN PROSTATIC HYPERPLASIA, UNSPECIFIED WHETHER LOWER URINARY TRACT SYMPTOMS PRESENT: ICD-10-CM

## 2018-08-06 PROCEDURE — 84153 ASSAY OF PSA TOTAL: CPT

## 2018-08-06 PROCEDURE — 36415 COLL VENOUS BLD VENIPUNCTURE: CPT

## 2018-08-06 RX ORDER — TAMSULOSIN HYDROCHLORIDE 0.4 MG/1
0.4 CAPSULE ORAL DAILY
Qty: 90 CAP | Refills: 0 | Status: SHIPPED | OUTPATIENT
Start: 2018-08-06 | End: 2019-09-05

## 2018-08-06 NOTE — PROGRESS NOTES
Chief Complaint   Patient presents with    Medication Evaluation     1. Have you been to the ER, urgent care clinic since your last visit? Hospitalized since your last visit? Yes, [facility] [place] yesterday due to syncope. 2. Have you seen or consulted any other health care providers outside of the [facility] since your last visit? Include any pap smears or colon screening.  No

## 2018-08-06 NOTE — PATIENT INSTRUCTIONS

## 2018-08-06 NOTE — MR AVS SNAPSHOT
Gregor Cole 
 
 
 [address] 
[phone] Patient: Delma Motta MRN: [id number] XPP:[id number][birth year] Visit Information Date & Time Provider Department Dept. Phone Encounter #  
 8/6/2018  2:00 PM Reva Castle  [place] [phone] [id number] Follow-up Instructions Return in about 3 months (around 11/6/2018) for Medicare Wellness Visit (30 min). Upcoming Health Maintenance Date Due ZOSTER VACCINE AGE 60> 1/22/2009 GLAUCOMA SCREENING Q2Y 3/22/2014 Pneumococcal 65+ Low/Medium Risk (2 of 2 - PCV13) 10/3/2015 COLONOSCOPY 1/1/2017 Influenza Age 5 to Adult 8/1/2018 MEDICARE YEARLY EXAM 9/27/2018 DTaP/Tdap/Td series (2 - Td) 9/18/2019 Allergies as of 8/6/2018  Review Complete On: 8/6/2018 By: Reva Castle MD  
 No Known Allergies Current Immunizations  Never Reviewed Name Date Influenza High Dose Vaccine PF 9/26/2017 Pneumococcal Polysaccharide (PPSV-23) 10/3/2014 TDAP Vaccine 9/18/2009 Not reviewed this visit You Were Diagnosed With   
  
 Codes Comments Benign prostatic hyperplasia, unspecified whether lower urinary tract symptoms present    -  Primary ICD-10-CM: N40.0 ICD-9-CM: 600.00 Incomplete bladder emptying     ICD-10-CM: R33.9 ICD-9-CM: 788.21 Vitals BP Pulse Temp Resp Height(growth percentile) Weight(growth percentile) 138/80 (BP 1 Location: Left arm, BP Patient Position: Sitting) 73 98.4 °F (36.9 °C) (Oral) 18 6' 1\" (1.854 m) 191 lb 12.8 oz (87 kg) SpO2 BMI Smoking Status 98% 25.3 kg/m2 Former Smoker Vitals History BMI and BSA Data Body Mass Index Body Surface Area  
 25.3 kg/m 2 2.12 m 2 Preferred Pharmacy Pharmacy Name Phone [id number] [place], [address] [facility] [phone] Your Updated Medication List  
  
   
 This list is accurate as of 8/6/18  2:51 PM.  Always use your most recent med list.  
  
  
  
  
 aspirin delayed-release 81 mg tablet Take 81 mg by mouth daily. fluticasone 50 mcg/actuation nasal spray Commonly known as:  Lella Solum 2 Sprays by Both Nostrils route daily. SINUS AND ALLERGY PE PO Take  by mouth as needed. tamsulosin 0.4 mg capsule Commonly known as:  FLOMAX Take 1 Cap by mouth daily. Prescriptions Sent to Pharmacy Refills  
 tamsulosin (FLOMAX) 0.4 mg capsule 0 Sig: Take 1 Cap by mouth daily. Class: Normal  
 Pharmacy: [id number] [place], [address]  #: [phone] Route: Oral  
  
We Performed the Following PSA, DIAGNOSTIC (PROSTATE SPECIFIC AG) [id number] CPT(R)] Follow-up Instructions Return in about 3 months (around 11/6/2018) for Medicare Wellness Visit (30 min). Patient Instructions Benign Prostatic Hyperplasia: Care Instructions Your Care Instructions Benign prostatic hyperplasia, or BPH, is an enlarged prostate gland. The prostate is a small gland that makes some of the fluid in semen. Prostate enlargement happens to almost all men as they age. It is usually not serious. BPH does not cause prostate cancer. As the prostate gets bigger, it may partly block the flow of urine. You may have a hard time getting a urine stream started or completely stopped. BPH can cause dribbling. You may have a weak urine stream, or you may have to urinate more often than you used to, especially at night. Most men find these problems easy to manage. You do not need treatment unless your symptoms bother you a lot or you have other problems, such as bladder infections or stones. In these cases, medicines may help. Surgery is not needed unless the urine flow is blocked or the symptoms do not get better with medicine. Follow-up care is a key part of your treatment and safety. Be sure to make and go to all appointments, and call your doctor if you are having problems. It's also a good idea to know your test results and keep a list of the medicines you take. How can you care for yourself at home? · Take plenty of time to urinate. Try to relax. · Try \"double voiding. \" Urinate as much you can, relax for a few moments, and then try to urinate again. · Sit on the toilet to urinate. · Read or think of other things while you are waiting. · Turn on a faucet, or try to picture running water. Some men find that this helps get their urine flowing. · If dribbling is a problem, wash your penis daily to avoid skin irritation and infection. · Avoid caffeine and alcohol. These drinks will increase how often you need to urinate. Spread your fluid intake throughout the day. If the urge to urinate often wakes you at night, limit your fluid intake in the evening. Urinate right before you go to bed. · Many over-the-counter cold and allergy medicines can make the symptoms of BPH worse. Avoid antihistamines, decongestants, and allergy pills, if you can. Read the warnings on the package. · If you take any prescription medicines, especially tranquilizers or antidepressants, ask your doctor or pharmacist whether they can cause urination problems. There may be other medicines you can use that do not cause urinary problems. · Be safe with medicines. Take your medicines exactly as prescribed. Call your doctor if you think you are having a problem with your medicine. When should you call for help? Call your doctor now or seek immediate medical care if: 
  · You cannot urinate at all.  
  · You have symptoms of a urinary infection. For example: ¨ You have blood or pus in your urine. ¨ You have pain in your back just below your rib cage. This is called flank pain. ¨ You have a fever, chills, or body aches. ¨ It hurts to urinate. ¨ You have groin or belly pain.  
 Watch closely for changes in your health, and be sure to contact your doctor if: 
  · It hurts when you ejaculate.  
  · Your urinary problems get a lot worse or bother you a lot. Where can you learn more? Go to http://allie-natalya.info/. Enter E013 in the search box to learn more about \"Benign Prostatic Hyperplasia: Care Instructions. \" Current as of: December 3, 2017 Content Version: 11.7 © 5602-8948 [employer]. Care instructions adapted under license by [employer] (which disclaims liability or warranty for this information). If you have questions about a medical condition or this instruction, always ask your healthcare professional. [address] any warranty or liability for your use of this information. Introducing [facility]! [facility] introduces [employer] patient portal. Now you can access parts of your medical record, email your doctor's office, and request medication refills online. 1. In your internet browser, go to [email or web address] 2. Click on the First Time User? Click Here link in the Sign In box. You will see the New Member Sign Up page. 3. Enter your Inside Securet Access Code exactly as it appears below. You will not need to use this code after youve completed the sign-up process. If you do not sign up before the expiration date, you must request a new code. · [employer] Access Code: [facility] Expires: 10/29/2018  6:59 PM 
 
4. Enter the last four digits of your Social Security Number (xxxx) and Date of Birth (mm/dd/yyyy) as indicated and click Submit. You will be taken to the next sign-up page. 5. Create a Inside Securet ID. This will be your [employer] login ID and cannot be changed, so think of one that is secure and easy to remember. 6. Create a Inside Securet password. You can change your password at any time. 7. Enter your Password Reset Question and Answer. This can be used at a later time if you forget your password. 8. Enter your e-mail address. You will receive e-mail notification when new information is available in [address] 9. Click Sign Up. You can now view and download portions of your medical record. 10. Click the Download Summary menu link to download a portable copy of your medical information. If you have questions, please visit the Frequently Asked Questions section of the Actual[employer] website. Remember, ActualMeds is NOT to be used for urgent needs. For medical emergencies, dial 911. Now available from your iPhone and Android! Please provide this summary of care documentation to your next provider. Your primary care clinician is listed as Macy Sigala. If you have any questions after today's visit, please call [phone].

## 2018-08-06 NOTE — PROGRESS NOTES
Patient Name: Al Granados   MRN: [id number]    Ildefonso Love is a 70 y.o. male who presents with the following:     Patient was seen last Tuesday for urinary hesitancy which has improved since starting Flomax for presumed BPH. Urine culture was negative. Patient states that his prior symptoms of urinary hesitancy and incomplete bladder voiding had improved when he took Flomax daily. He did forget a dose on Saturday and resumed his Sunday morning dose after a couple coffee but before breakfast.  30 minutes after taking the Flomax, he was eating at a restaurant and suddenly became lightheaded. Once they brought out the food, patient was witnessed to have fainted and lost consciousness for about a minute. He did not hit his head. Denies any seizure-like activity. Patient was transported to the emergency room via EMS for syncopal workup. Blood work was unremarkable and EKG was normal sinus rhythm. He was discharged home, with a diagnosis of vasovagal syncope. Currently, patient reports feeling well. He believes that taking the Flomax with out eating anything before caused him to faint. Denied any chest pain, shortness of breath, vision changes, seizures. Review of Systems   Constitutional: Negative for fever, malaise/fatigue and weight loss. Respiratory: Negative for cough, hemoptysis, shortness of breath and wheezing. Cardiovascular: Negative for chest pain, palpitations, leg swelling and PND. Gastrointestinal: Negative for abdominal pain, constipation, diarrhea, nausea and vomiting. Genitourinary: Negative for dysuria, frequency, hematuria and urgency. Neurological: Positive for loss of consciousness. Negative for dizziness, tingling, tremors, sensory change, speech change, seizures and headaches. The patient's medications, allergies, past medical history, surgical history, family history and social history were reviewed and updated where appropriate.       Prior to Admission medications    Medication Sig Start Date End Date Taking? Authorizing Provider   tamsulosin (FLOMAX) 0.4 mg capsule Take 1 Cap by mouth daily. 7/31/18  Yes Richard Choudhury MD   CHLORPHENIRAMINE/PHENYLEPHRINE (SINUS AND ALLERGY PE PO) Take  by mouth as needed. Yes Historical Provider   fluticasone (FLONASE) 50 mcg/actuation nasal spray 2 Sprays by Both Nostrils route daily. Patient taking differently: 2 Sprays by Both Nostrils route as needed. 5/3/16  Yes Chivo Vila NP   aspirin delayed-release 81 mg tablet Take 81 mg by mouth daily. Yes Historical Provider       No Known Allergies        OBJECTIVE    Visit Vitals    /80 (BP 1 Location: Left arm, BP Patient Position: Sitting)    Pulse 73    Temp 98.4 °F (36.9 °C) (Oral)    Resp 18    Ht 6' 1\" (1.854 m)    Wt 191 lb 12.8 oz (87 kg)    SpO2 98%    BMI 25.3 kg/m2       Physical Exam   Constitutional: He is oriented to person, place, and time and well-developed, well-nourished, and in no distress. No distress. Eyes: Conjunctivae and EOM are normal. Pupils are equal, round, and reactive to light. Cardiovascular: Normal rate, regular rhythm and normal heart sounds. Exam reveals no gallop and no friction rub. No murmur heard. Pulmonary/Chest: Effort normal and breath sounds normal. No respiratory distress. He has no wheezes. Genitourinary: Prostate normal. Prostate is not enlarged and not tender. Neurological: He is alert and oriented to person, place, and time. Skin: Skin is warm and dry. No rash noted. He is not diaphoretic. Psychiatric: Mood, memory, affect and judgment normal.   Nursing note and vitals reviewed. ASSESSMENT AND PLAN  Maciej Flores is a 70 y.o. male who presents today for:    1. History of syncope  Patient currently appears to be well. Episode likely due to taking Flomax without food/drink which may decrease blood pressure.   Discussed with patient that he should take Flomax daily 30 minutes after a meal once daily to avoid this in the future. Orthostatic vitals today were negative. Will hold off on any additional workup since patient is back at baseline. 2. Benign prostatic hyperplasia, unspecified whether lower urinary tract symptoms present  Discussed with patient that he may benefit from daily Flomax due to BPH. Prostate exam within normal limits. Will also obtain a PSA. Discussed testing limitations with both PSA and  exam. - tamsulosin (FLOMAX) 0.4 mg capsule; Take 1 Cap by mouth daily. Dispense: 90 Cap; Refill: 0  - PROSTATE SPECIFIC AG       Medications Discontinued During This Encounter   Medication Reason    tamsulosin (FLOMAX) 0.4 mg capsule Reorder       Follow-up Disposition:  Return in about 3 months (around 11/6/2018) for Medicare Wellness Visit (30 min). Medication risks/benefits/costs/interactions/alternatives discussed with patient. Advised patient to call back or return to office if symptoms worsen/change/persist. If patient cannot reach us or should anything more severe/urgent arise he/she should proceed directly to the nearest emergency department. Discussed expected course/resolution/complications of diagnosis in detail with patient. Patient given a written after visit summary which includes his/her diagnoses, current medications and vitals. Patient expressed understanding with the diagnosis and plan.      Radha Jackson M.D.

## 2018-08-07 LAB — PSA SERPL-MCNC: 3.8 NG/ML (ref 0–4)

## 2018-08-08 NOTE — PROGRESS NOTES
Called patient verified  informed him of the following info. PSA slightly increased since last year but still within normal limits. Continue with Flomax.

## 2018-08-08 NOTE — PROGRESS NOTES
Please notify patient regarding their test results:    PSA slightly increased since last year but still within normal limits. Continue with Flomax.

## 2018-10-08 ENCOUNTER — TELEPHONE (OUTPATIENT)
Dept: FAMILY MEDICINE CLINIC | Age: 68
End: 2018-10-08

## 2018-10-08 NOTE — TELEPHONE ENCOUNTER
Wife called back, wants her  checked for depression on his next apt, which the wife has scheduled for 10/17 for\" check up \". She states he has always had little depression in past but had gradually gotten worse. Wife feels he's very depressed as well as 3 friends (2 of them are nurses). Sates he sits off to a corner of room a lot, does not engage with company or other people. Sad and at times displays anger and irritation. He does not get physical with any abuse. She would like for you to ask him some questions on his apt that might help him to admit this. She feels that he may need medications and counseling. I informed her that I would past this on to you. Did encourage her to try to talk to him about this, since there is not a lot we can do if he doesn't admit or seek help.

## 2018-10-08 NOTE — TELEPHONE ENCOUNTER
----- Message from Sandy Winters sent at 10/8/2018 12:50 PM EDT -----  Regarding: Mike Kolb - NP/ telephone  Pt's wife Marcos Mtz would like to speak with the NP. before his apt Wednesday, October 17, 2018 11:45 AM  Pt's wife's contact [phone].

## 2018-10-08 NOTE — TELEPHONE ENCOUNTER
Called patient wife back as requested. 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 But she states she knows this is related to his stress. Wondering if you could call her with update after his apt. She is on [employer] form.

## 2018-10-08 NOTE — TELEPHONE ENCOUNTER
----- Message from Yusra Conde sent at 10/8/2018  3:00 PM EDT -----  Regarding: Leticia Jimenez - NP/ telephone  Pt wife remembered one more thing that she need to let Dominic Dotson know before Pt's appt. Pts wife's contact [phone].

## 2018-10-17 ENCOUNTER — HOSPITAL ENCOUNTER (OUTPATIENT)
Dept: LAB | Age: 68
Discharge: HOME | End: 2018-10-17
Payer: MEDICARE

## 2018-10-17 ENCOUNTER — OFFICE VISIT (OUTPATIENT)
Dept: FAMILY MEDICINE CLINIC | Age: 68
End: 2018-10-17

## 2018-10-17 VITALS
HEIGHT: 73 IN | BODY MASS INDEX: 25.71 KG/M2 | RESPIRATION RATE: 19 BRPM | TEMPERATURE: 98.5 F | DIASTOLIC BLOOD PRESSURE: 81 MMHG | OXYGEN SATURATION: 98 % | WEIGHT: 194 LBS | HEART RATE: 78 BPM | SYSTOLIC BLOOD PRESSURE: 132 MMHG

## 2018-10-17 DIAGNOSIS — Z13.29 SCREENING FOR ENDOCRINE, METABOLIC AND IMMUNITY DISORDER: ICD-10-CM

## 2018-10-17 DIAGNOSIS — Z23 ENCOUNTER FOR IMMUNIZATION: ICD-10-CM

## 2018-10-17 DIAGNOSIS — Z13.0 SCREENING FOR ENDOCRINE, METABOLIC AND IMMUNITY DISORDER: ICD-10-CM

## 2018-10-17 DIAGNOSIS — R35.0 BENIGN PROSTATIC HYPERPLASIA WITH URINARY FREQUENCY: Primary | ICD-10-CM

## 2018-10-17 DIAGNOSIS — Z00.00 MEDICARE ANNUAL WELLNESS VISIT, SUBSEQUENT: ICD-10-CM

## 2018-10-17 DIAGNOSIS — Z12.11 SCREEN FOR COLON CANCER: ICD-10-CM

## 2018-10-17 DIAGNOSIS — Z71.89 ACP (ADVANCE CARE PLANNING): ICD-10-CM

## 2018-10-17 DIAGNOSIS — Z13.228 SCREENING FOR ENDOCRINE, METABOLIC AND IMMUNITY DISORDER: ICD-10-CM

## 2018-10-17 DIAGNOSIS — N40.1 BENIGN PROSTATIC HYPERPLASIA WITH URINARY FREQUENCY: Primary | ICD-10-CM

## 2018-10-17 LAB
BILIRUB UR QL STRIP: NEGATIVE
GLUCOSE UR-MCNC: NEGATIVE MG/DL
KETONES P FAST UR STRIP-MCNC: NEGATIVE MG/DL
PH UR STRIP: 5 [PH] (ref 4.6–8)
PROT UR QL STRIP: NEGATIVE
SP GR UR STRIP: 1.03 (ref 1–1.03)
UA UROBILINOGEN AMB POC: NORMAL (ref 0.2–1)
URINALYSIS CLARITY POC: CLEAR
URINALYSIS COLOR POC: YELLOW
URINE BLOOD POC: NEGATIVE
URINE LEUKOCYTES POC: NEGATIVE
URINE NITRITES POC: NEGATIVE

## 2018-10-17 PROCEDURE — 85027 COMPLETE CBC AUTOMATED: CPT

## 2018-10-17 PROCEDURE — 84153 ASSAY OF PSA TOTAL: CPT

## 2018-10-17 PROCEDURE — 80053 COMPREHEN METABOLIC PANEL: CPT

## 2018-10-17 PROCEDURE — 36415 COLL VENOUS BLD VENIPUNCTURE: CPT

## 2018-10-17 PROCEDURE — 80061 LIPID PANEL: CPT

## 2018-10-17 NOTE — PROGRESS NOTES
[facility] Note    Dianne Cramer is a 70 y.o. male who was seen in clinic today (10/17/2018). Subjective:  Cardiovascular Review:  The patient has no known cardiovascular conditions. Diet and Lifestyle: generally follows a low fat low cholesterol diet, generally follows a low sodium diet, exercises sporadically, smoker cigar once per month  Home BP Monitoring: is not measured at home. Pertinent ROS: taking medications as instructed, no medication side effects noted, no TIA's, no chest pain on exertion, no dyspnea on exertion, no swelling of ankles. Last colonoscopy in 2007. BPH   Patient complains of lower urinary tract symptoms. Patient reports mild symptoms of BPH. Onset of symptoms was a few months ago and was gradual in onset. He reports irritative symptoms including frequency. He denies intermittency, weak stream.  Not currently taking Flomax as previously prescribed. Prior to Admission medications    Medication Sig Start Date End Date Taking? Authorizing Provider   varicella-zoster recombinant, PF, (SHINGRIX, PF,) 50 mcg/0.5 mL susr injection 0.5 mL by IntraMUSCular route once for 1 dose. 10/17/18 10/17/18 Yes Power Rasheed NP   pneumococcal 13 john conj dip (PREVNAR-13) 0.5 mL syrg injection 0.5 mL by IntraMUSCular route once for 1 dose. 10/17/18 10/17/18 Yes Power Rasheed NP   tamsulosin (FLOMAX) 0.4 mg capsule Take 1 Cap by mouth daily. 8/6/18  Yes Nikki Ball MD   CHLORPHENIRAMINE/PHENYLEPHRINE (SINUS AND ALLERGY PE PO) Take  by mouth as needed. Yes Provider, Historical   fluticasone (FLONASE) 50 mcg/actuation nasal spray 2 Sprays by Both Nostrils route daily. Patient taking differently: 2 Sprays by Both Nostrils route as needed. 5/3/16  Yes Le Bustillo NP   aspirin delayed-release 81 mg tablet Take 81 mg by mouth daily.    Yes Provider, Historical          No Known Allergies        Review of Systems   Constitutional: Negative for malaise/fatigue and weight loss. HENT: Negative for hearing loss. Eyes: Negative for blurred vision. Respiratory: Negative for shortness of breath. Cardiovascular: Negative for chest pain, palpitations and leg swelling. Gastrointestinal: Negative for abdominal pain, constipation, diarrhea and heartburn. Genitourinary: Negative for frequency and urgency. Musculoskeletal: Negative for back pain, joint pain and myalgias. Neurological: Negative for dizziness, weakness and headaches. Endo/Heme/Allergies: Does not bruise/bleed easily. Psychiatric/Behavioral: Negative for depression. Objective:   Physical Exam   Constitutional: He is oriented to person, place, and time. He appears well-developed and well-nourished. No distress. HENT:   Right Ear: Tympanic membrane and ear canal normal.   Left Ear: Tympanic membrane and ear canal normal.   Nose: No mucosal edema. Right sinus exhibits no maxillary sinus tenderness and no frontal sinus tenderness. Left sinus exhibits no maxillary sinus tenderness and no frontal sinus tenderness. Mouth/Throat: Oropharynx is clear and moist.   Eyes: EOM are normal. Pupils are equal, round, and reactive to light. Neck: Normal range of motion. Neck supple. No JVD present. Carotid bruit is not present. No thyromegaly present. Cardiovascular: Normal rate, regular rhythm, normal heart sounds and intact distal pulses. Exam reveals no gallop and no friction rub. No murmur heard. Pulmonary/Chest: Effort normal and breath sounds normal. No respiratory distress. He has no decreased breath sounds. He has no wheezes. He has no rhonchi. Abdominal: Soft. Bowel sounds are normal. He exhibits no distension. There is no tenderness. A hernia is present. Hernia confirmed positive in the right inguinal area. Musculoskeletal: He exhibits no edema. Lymphadenopathy:     He has no cervical adenopathy.    Neurological: He is alert and oriented to person, place, and time. Psychiatric: He has a normal mood and affect. His behavior is normal.   Nursing note and vitals reviewed. Visit Vitals  /81 (BP 1 Location: Left arm, BP Patient Position: Sitting)   Pulse 78   Temp 98.5 °F (36.9 °C) (Oral)   Resp 19   Ht 6' 1\" (1.854 m)   Wt 194 lb (88 kg)   SpO2 98%   BMI 25.60 kg/m²       Assessment & Plan:  Diagnoses and all orders for this visit:    Benign prostatic hyperplasia with urinary frequency  Continue to monitor PSA. Referral to urology for continued symptoms.   -     AMB POC URINALYSIS DIP STICK AUTO W/O MICRO  -     PSA W/ REFLX FREE PSA    Encounter for immunization  -     INFLUENZA VACCINE INACTIVATED (IIV), SUBUNIT, ADJUVANTED, IM  -     [place] IMMUNIZ ADMIN,1 SINGLE/COMB VAC/TOXOID  -     varicella-zoster recombinant, PF, (SHINGRIX, PF,) 50 mcg/0.5 mL susr injection; 0.5 mL by IntraMUSCular route once for 1 dose. -     pneumococcal 13 john conj dip (PREVNAR-13) 0.5 mL syrg injection; 0.5 mL by IntraMUSCular route once for 1 dose. ACP (advance care planning)    Medicare annual wellness visit, subsequent    Screen for colon cancer  -     REFERRAL TO GASTROENTEROLOGY    Screening for endocrine, metabolic and immunity disorder  -     CBC W/O DIFF  -     METABOLIC PANEL, COMPREHENSIVE  -     LIPID PANEL        I have discussed the diagnosis with the patient and the intended plan as seen in the above orders. The patient has received an after-visit summary along with patient information handout. I have discussed medication side effects and warnings with the patient as well. Follow-up Disposition:  Return in about 6 months (around 4/17/2019) for BPH.         Nathan Swift NP

## 2018-10-17 NOTE — PATIENT INSTRUCTIONS
Vaccine Information Statement    Influenza (Flu) Vaccine (Inactivated or Recombinant): What you need to know    Many Vaccine Information Statements are available in [place] and other languages. See www.immunize.org/vis  Hojas de Información Sobre Vacunas están disponibles en Español y en muchos otros idiomas. Visite www.immunize.org/vis    1. Why get vaccinated? Influenza (flu) is a contagious disease that spreads around the [place] every year, usually between October and May. Flu is caused by influenza viruses, and is spread mainly by coughing, sneezing, and close contact. Anyone can get flu. Flu strikes suddenly and can last several days. Symptoms vary by age, but can include:   fever/chills   sore throat   muscle aches   fatigue   cough   headache    runny or stuffy nose    Flu can also lead to pneumonia and blood infections, and cause diarrhea and seizures in children. If you have a medical condition, such as heart or lung disease, flu can make it worse. Flu is more dangerous for some people. Infants and young children, people 72years of age and older, pregnant women, and people with certain health conditions or a weakened immune system are at greatest risk. Each year thousands of people in the [facility] die from flu, and many more are hospitalized. Flu vaccine can:   keep you from getting flu,   make flu less severe if you do get it, and   keep you from spreading flu to your family and other people. 2. Inactivated and recombinant flu vaccines    A dose of flu vaccine is recommended every flu season. Children 6 months through 6years of age may need two doses during the same flu season. Everyone else needs only one dose each flu season.        Some inactivated flu vaccines contain a very small amount of a mercury-based preservative called thimerosal. Studies have not shown thimerosal in vaccines to be harmful, but flu vaccines that do not contain thimerosal are available. There is no live flu virus in flu shots. They cannot cause the flu. There are many flu viruses, and they are always changing. Each year a new flu vaccine is made to protect against three or four viruses that are likely to cause disease in the upcoming flu season. But even when the vaccine doesnt exactly match these viruses, it may still provide some protection    Flu vaccine cannot prevent:   flu that is caused by a virus not covered by the vaccine, or   illnesses that look like flu but are not. It takes about 2 weeks for protection to develop after vaccination, and protection lasts through the flu season. 3. Some people should not get this vaccine    Tell the person who is giving you the vaccine:     If you have any severe, life-threatening allergies. If you ever had a life-threatening allergic reaction after a dose of flu vaccine, or have a severe allergy to any part of this vaccine, you may be advised not to get vaccinated. Most, but not all, types of flu vaccine contain a small amount of egg protein.  If you ever had Guillain-Barré Syndrome (also called GBS). Some people with a history of GBS should not get this vaccine. This should be discussed with your doctor.  If you are not feeling well. It is usually okay to get flu vaccine when you have a mild illness, but you might be asked to come back when you feel better. 4. Risks of a vaccine reaction    With any medicine, including vaccines, there is a chance of reactions. These are usually mild and go away on their own, but serious reactions are also possible. Most people who get a flu shot do not have any problems with it.      Minor problems following a flu shot include:    soreness, redness, or swelling where the shot was given     hoarseness   sore, red or itchy eyes   cough   fever   aches   headache   itching   fatigue  If these problems occur, they usually begin soon after the shot and last 1 or 2 days. More serious problems following a flu shot can include the following:     There may be a small increased risk of Guillain-Barré Syndrome (GBS) after inactivated flu vaccine. This risk has been estimated at 1 or 2 additional cases per million people vaccinated. This is much lower than the risk of severe complications from flu, which can be prevented by flu vaccine.  Young children who get the flu shot along with pneumococcal vaccine (PCV13) and/or DTaP vaccine at the same time might be slightly more likely to have a seizure caused by fever. Ask your doctor for more information. Tell your doctor if a child who is getting flu vaccine has ever had a seizure. Problems that could happen after any injected vaccine:      People sometimes faint after a medical procedure, including vaccination. Sitting or lying down for about 15 minutes can help prevent fainting, and injuries caused by a fall. Tell your doctor if you feel dizzy, or have vision changes or ringing in the ears.  Some people get severe pain in the shoulder and have difficulty moving the arm where a shot was given. This happens very rarely.  Any medication can cause a severe allergic reaction. Such reactions from a vaccine are very rare, estimated at about 1 in a million doses, and would happen within a few minutes to a few hours after the vaccination. As with any medicine, there is a very remote chance of a vaccine causing a serious injury or death. The safety of vaccines is always being monitored. For more information, visit: www.cdc.gov/vaccinesafety/    5. What if there is a serious reaction? What should I look for?  Look for anything that concerns you, such as signs of a severe allergic reaction, very high fever, or unusual behavior.     Signs of a severe allergic reaction can include hives, swelling of the face and throat, difficulty breathing, a fast heartbeat, dizziness, and weakness - usually within a few minutes to a few hours after the vaccination. What should I do?  If you think it is a severe allergic reaction or other emergency that cant wait, call 9-1-1 and get the person to the nearest hospital. Otherwise, call your doctor.  Reactions should be reported to the Vaccine Adverse Event Reporting System (VAERS). Your doctor should file this report, or you can do it yourself through  the VAERS web site at www.vaers. [facility].gov, or by calling 6-265.583.1453. VAERS does not give medical advice. 6. The National Vaccine Injury Compensation Program    The [facility] Vaccine Injury Compensation Program (VICP) is a federal program that was created to compensate people who may have been injured by certain vaccines. Persons who believe they may have been injured by a vaccine can learn about the program and about filing a claim by calling 1-809.823.2034 or visiting the [employer]. website at www.[facility].gov/vaccinecompensation. There is a time limit to file a claim for compensation. 7. How can I learn more?  Ask your healthcare provider. He or she can give you the vaccine package insert or suggest other sources of information.  Call your local or state health department.  Contact the Centers for Disease Control and Prevention (CDC):  - Call 9-514.321.5814 (1-800-CDC-INFO) or  - Visit CDCs website at www.cdc.gov/flu    Vaccine Information Statement   Inactivated Influenza Vaccine   8/7/2015  42 TERRI Britton [id number]    Department of Health and Human Services  Centers for Disease Control and Prevention    Office Use Only

## 2018-10-17 NOTE — PROGRESS NOTES
Chief Complaint   Patient presents with    Complete Physical     1. Have you been to the ER, urgent care clinic since your last visit? Hospitalized since your last visit? No    2. Have you seen or consulted any other health care providers outside of the [address] [place] [address] since your last visit? Include any pap smears or colon screening. No     Influenza (high Dose) vaccine given in right deltoid, IM. Patient tolerated injection well with no adverse reactions while here in the office. Consent form completed and signed by patient.

## 2018-10-17 NOTE — LETTER
10/22/2018 9:02 PM 
 
Mr. Annie Millerarnold [id number] Dear Annie Powell: 
 
Please find your most recent results below. Resulted Orders CBC W/O DIFF Result Value Ref Range WBC 9.0 3.4 - 10.8 x10E3/uL  
 RBC 4.75 4.14 - 5.80 x10E6/uL HGB 14.8 13.0 - 17.7 g/dL HCT 43.4 37.5 - 51.0 % MCV 91 79 - 97 fL  
 MCH 31.2 26.6 - 33.0 pg  
 MCHC 34.1 31.5 - 35.7 g/dL  
 RDW 14.5 12.3 - 15.4 % PLATELET 195 504 - 983 x10E3/uL METABOLIC PANEL, COMPREHENSIVE Result Value Ref Range Glucose 97 65 - 99 mg/dL BUN 19 8 - 27 mg/dL Creatinine 1.09 0.76 - 1.27 mg/dL GFR est non-AA 69 >59 mL/min/1.73 GFR est AA 80 >59 mL/min/1.73  
 BUN/Creatinine ratio 17 10 - 24 Sodium 141 134 - 144 mmol/L Potassium 4.2 3.5 - 5.2 mmol/L Chloride 103 96 - 106 mmol/L  
 CO2 25 20 - 29 mmol/L Calcium 9.8 8.6 - 10.2 mg/dL Protein, total 6.9 6.0 - 8.5 g/dL Albumin 4.5 3.6 - 4.8 g/dL GLOBULIN, TOTAL 2.4 1.5 - 4.5 g/dL A-G Ratio 1.9 1.2 - 2.2 Bilirubin, total 0.5 0.0 - 1.2 mg/dL Alk. phosphatase 65 39 - 117 IU/L  
 AST (SGOT) 33 0 - 40 IU/L  
 ALT (SGPT) 20 0 - 44 IU/L  
LIPID PANEL Result Value Ref Range Cholesterol, total 161 100 - 199 mg/dL Triglyceride 96 0 - 149 mg/dL HDL Cholesterol 56 >39 mg/dL VLDL, calculated 19 5 - 40 mg/dL LDL, calculated 86 0 - 99 mg/dL PSA W/ REFLX FREE PSA Result Value Ref Range Prostate Specific Ag 2.6 0.0 - 4.0 ng/mL RECOMMENDATIONS: 
Your glucose, liver and kidneys were normal. Your blood cells for infection and anemia were normal. 
 
Your prostate level (PSA) was stable and within normal range. Your total cholesterol, triglycerides and LDL (bad cholesterol) were to goal. Good work! Please call me if you have any questions: [phone] Sincerely, Selene Lawson NP

## 2018-10-17 NOTE — PROGRESS NOTES
Kory Benedict is a 70 y.o. male and presents for annual Medicare Wellness Visit. Problem List: Reviewed with patient and discussed risk factors. Patient Active Problem List   Diagnosis Code    AR (allergic rhinitis) J30.9    Anxiety disorder F41.9    Right inguinal hernia K40.90    ACP (advance care planning) Z71.89       Current medical providers:  Patient Care Team:  Keyanna Goodman NP as PCP - General (Nurse Practitioner)    [address]: Reviewed with patient  Past Surgical History:   Procedure Laterality Date    ENDOSCOPY, COLON, DIAGNOSTIC  2007    repeat 2014    HX APPENDECTOMY  1953         HX HERNIA REPAIR  2/85    HX KNEE ARTHROSCOPY  6/02    HX ORTHOPAEDIC  10/75    knee repair        SH: Reviewed with patient  Social History     Tobacco Use    Smoking status: Former Smoker     Years: 10.00     Types: Cigars    Smokeless tobacco: Never Used    Tobacco comment: off and on   Substance Use Topics    Alcohol use: Yes    Drug use: No       FH: Reviewed with patient  Family History   Problem Relation Age of Onset    Heart Disease Mother         CAD s/p CABG 66's    Cancer Father     Alcohol abuse Father        Medications/Allergies: Reviewed with patient  Current Outpatient Medications on File Prior to Visit   Medication Sig Dispense Refill    tamsulosin (FLOMAX) 0.4 mg capsule Take 1 Cap by mouth daily. 90 Cap 0    CHLORPHENIRAMINE/PHENYLEPHRINE (SINUS AND ALLERGY PE PO) Take  by mouth as needed.  fluticasone (FLONASE) 50 mcg/actuation nasal spray 2 Sprays by Both Nostrils route daily. (Patient taking differently: 2 Sprays by Both Nostrils route as needed.) 1 Bottle 11    aspirin delayed-release 81 mg tablet Take 81 mg by mouth daily. No current facility-administered medications on file prior to visit.        No Known Allergies    Objective:  Visit Vitals  /81 (BP 1 Location: Left arm, BP Patient Position: Sitting)   Pulse 78   Temp 98.5 °F (36.9 °C) (Oral)   Resp 19 Ht 6' 1\" (1.854 m)   Wt 194 lb (88 kg)   SpO2 98%   BMI 25.60 kg/m²    Body mass index is 25.6 kg/m². Assessment of cognitive impairment: Alert and oriented x 3    Depression Screen:   PHQ over the last two weeks 10/17/2018   Little interest or pleasure in doing things Not at all   Feeling down, depressed, irritable, or hopeless Not at all   Total Score PHQ 2 0       Fall Risk Assessment:    Fall Risk Assessment, last 12 mths 10/17/2018   Able to walk? Yes   Fall in past 12 months? No       Functional Ability:   Does the patient exhibit a steady gait? yes   How long did it take the patient to get up and walk from a sitting position? 1 sec   Is the patient self reliant?  (ie can do own laundry, meals, household chores)  yes     Does the patient handle his/her own medications? yes     Does the patient handle his/her own money? yes     Is the patients home safe (ie good lighting, handrails on stairs and bath, etc.)? yes     Did you notice or did patient express any hearing difficulties? no     Did you notice or did patient express any vision difficulties?   no     Were distance and reading eye charts used? no       Advance Care Planning:   Patient was offered the opportunity to discuss advance care planning:  yes     Does patient have an Advance Directive:  no   If no, did you provide information on Caring Connections?   yes       Plan:      Orders Placed This Encounter    Influenza Vaccine Inactivated (IIV) (FLUAD), Subunit, Adjuvanted, IM (98237)    CBC W/O DIFF    METABOLIC PANEL, COMPREHENSIVE    LIPID PANEL    PSA W/ REFLX FREE PSA    [facility]    AMB POC URINALYSIS DIP STICK AUTO W/O MICRO    [place] IMMUNIZ ADMIN,1 SINGLE/COMB VAC/TOXOID    varicella-zoster recombinant, PF, (SHINGRIX, PF,) 50 mcg/0.5 mL susr injection    pneumococcal 13 john conj dip (PREVNAR-13) 0.5 mL syrg injection       Health Maintenance   Topic Date Due    Shingrix Vaccine Age 49> (1 of 2) 03/22/1999    GLAUCOMA SCREENING Q2Y  03/22/2014    Pneumococcal 65+ Low/Medium Risk (2 of 2 - PCV13) 10/03/2015    COLONOSCOPY  01/01/2017    MEDICARE YEARLY EXAM  09/27/2018    DTaP/Tdap/Td series (2 - Td) 09/18/2019    Hepatitis C Screening  Completed    Influenza Age 5 to Adult  Completed       *Patient verbalized understanding and agreement with the plan. A copy of the After Visit Summary with personalized health plan was given to the patient today.

## 2018-10-18 LAB
ALBUMIN SERPL-MCNC: 4.5 G/DL (ref 3.6–4.8)
ALBUMIN/GLOB SERPL: 1.9 {RATIO} (ref 1.2–2.2)
ALP SERPL-CCNC: 65 IU/L (ref 39–117)
ALT SERPL-CCNC: 20 IU/L (ref 0–44)
AST SERPL-CCNC: 33 IU/L (ref 0–40)
BILIRUB SERPL-MCNC: 0.5 MG/DL (ref 0–1.2)
BUN SERPL-MCNC: 19 MG/DL (ref 8–27)
BUN/CREAT SERPL: 17 (ref 10–24)
CALCIUM SERPL-MCNC: 9.8 MG/DL (ref 8.6–10.2)
CHLORIDE SERPL-SCNC: 103 MMOL/L (ref 96–106)
CHOLEST SERPL-MCNC: 161 MG/DL (ref 100–199)
CO2 SERPL-SCNC: 25 MMOL/L (ref 20–29)
CREAT SERPL-MCNC: 1.09 MG/DL (ref 0.76–1.27)
ERYTHROCYTE [DISTWIDTH] IN BLOOD BY AUTOMATED COUNT: 14.5 % (ref 12.3–15.4)
GLOBULIN SER CALC-MCNC: 2.4 G/DL (ref 1.5–4.5)
GLUCOSE SERPL-MCNC: 97 MG/DL (ref 65–99)
HCT VFR BLD AUTO: 43.4 % (ref 37.5–51)
HDLC SERPL-MCNC: 56 MG/DL
HGB BLD-MCNC: 14.8 G/DL (ref 13–17.7)
INTERPRETATION, 910389: NORMAL
LDLC SERPL CALC-MCNC: 86 MG/DL (ref 0–99)
MCH RBC QN AUTO: 31.2 PG (ref 26.6–33)
MCHC RBC AUTO-ENTMCNC: 34.1 G/DL (ref 31.5–35.7)
MCV RBC AUTO: 91 FL (ref 79–97)
PLATELET # BLD AUTO: 264 X10E3/UL (ref 150–379)
POTASSIUM SERPL-SCNC: 4.2 MMOL/L (ref 3.5–5.2)
PROT SERPL-MCNC: 6.9 G/DL (ref 6–8.5)
PSA SERPL-MCNC: 2.6 NG/ML (ref 0–4)
RBC # BLD AUTO: 4.75 X10E6/UL (ref 4.14–5.8)
REFLEX CRITERIA: NORMAL
SODIUM SERPL-SCNC: 141 MMOL/L (ref 134–144)
TRIGL SERPL-MCNC: 96 MG/DL (ref 0–149)
VLDLC SERPL CALC-MCNC: 19 MG/DL (ref 5–40)
WBC # BLD AUTO: 9 X10E3/UL (ref 3.4–10.8)

## 2019-01-01 ENCOUNTER — TELEPHONE (OUTPATIENT)
Dept: FAMILY MEDICINE CLINIC | Age: 69
End: 2019-01-01

## 2019-01-01 NOTE — TELEPHONE ENCOUNTER
Answered phone call from patient's wife Jaxon Anders who is on [facility]  Talked to patient directly. He has noticed floaters since 10 am today, he has h/o floaters and was following Dr Jimenez Almodovar. His work up was negative . Floaters stopped about 3 months back and just started again today. Denies headache,dizziness, blurred vision,double vision, vision loss. Floaters lasted about 2 hours and it was stopped when I called him. Reviewed his chart. No significant medical history. Advised to contact ophthalmology on call for concern of retinal/vitrous  detachment or tear. Advised to limit exposure to bright light, TV and computer. If symptoms return and can not get contact with eye specialist, to go to ER for urgent evaluation. Patient verbalized our conversation.

## 2019-01-15 ENCOUNTER — TELEPHONE (OUTPATIENT)
Dept: FAMILY MEDICINE CLINIC | Age: 69
End: 2019-01-15

## 2019-01-18 NOTE — TELEPHONE ENCOUNTER
Patient called back notified of the new appointment 1/24/2019 at 11:45AM for evaluation patient understand

## 2019-01-18 NOTE — TELEPHONE ENCOUNTER
Received notes from Eye doctor and patient has appointment 1/31/2019 with HUMA Rasheed per Haley Linares if we can see patient sooner    [phone] (home) [phone] (work)  Attempted to call patient wife Alexi Roper answer and advised her if patient wants to be sooner that 1/31/2019 Prasanna Eldridge will talk to patient and will call us back

## 2019-01-24 ENCOUNTER — OFFICE VISIT (OUTPATIENT)
Dept: FAMILY MEDICINE CLINIC | Age: 69
End: 2019-01-24

## 2019-01-24 VITALS
WEIGHT: 197.2 LBS | HEART RATE: 84 BPM | RESPIRATION RATE: 18 BRPM | TEMPERATURE: 97.6 F | DIASTOLIC BLOOD PRESSURE: 75 MMHG | HEIGHT: 73 IN | OXYGEN SATURATION: 97 % | SYSTOLIC BLOOD PRESSURE: 120 MMHG | BODY MASS INDEX: 26.14 KG/M2

## 2019-01-24 DIAGNOSIS — H34.9 RETINAL EMBOLI, LEFT: ICD-10-CM

## 2019-01-24 DIAGNOSIS — H53.10 SUBJECTIVE VISUAL DISTURBANCE OF LEFT EYE: Primary | ICD-10-CM

## 2019-01-24 DIAGNOSIS — Z23 NEED FOR VACCINATION WITH 13-POLYVALENT PNEUMOCOCCAL CONJUGATE VACCINE: ICD-10-CM

## 2019-01-24 DIAGNOSIS — Z12.11 SCREEN FOR COLON CANCER: ICD-10-CM

## 2019-01-24 NOTE — PATIENT INSTRUCTIONS
Colon Cancer Screening: Care Instructions  Your Care Instructions    Colorectal cancer occurs in the colon or rectum. That's the lower part of your digestive system. It is the second-leading cause of cancer deaths in the [place]. It often starts with small growths called polyps in the colon or rectum. Polyps are usually found with screening tests. Depending on the type of test, any polyps found may be removed during the tests. Colorectal cancer usually does not cause symptoms at first. But regular tests can help find it early, before it spreads and becomes harder to treat. Experts advise routine tests for colon cancer for people starting at age 48. And they advise people with a higher risk of colon cancer to get tested sooner. Talk with your doctor about when you should start testing. Discuss which tests you need. Follow-up care is a key part of your treatment and safety. Be sure to make and go to all appointments, and call your doctor if you are having problems. It's also a good idea to know your test results and keep a list of the medicines you take. What are the main screening tests for colon cancer? · Stool tests. These include the fecal immunochemical test (FIT) and the fecal occult blood test (FOBT). These tests check stool samples for signs of cancer. If your test is positive, you will need to have a colonoscopy. · Sigmoidoscopy. This test lets your doctor look at the lining of your rectum and the lowest part of your colon. Your doctor uses a lighted tube called a sigmoidoscope. This test can't find cancers or polyps in the upper part of your colon. In some cases, polyps that are found can be removed. But if your doctor finds polyps, you will need to have a colonoscopy to check the upper part of your colon. · Colonoscopy. This test lets your doctor look at the lining of your rectum and your entire colon. The doctor uses a thin, flexible tool called a colonoscope.  It can also be used to remove polyps or get a tissue sample (biopsy). What tests do you need? The following guidelines are for people age 48 and over who are not at high risk for colorectal cancer. You may have at least one of these tests as directed by your doctor. · Fecal immunochemical test (FIT) or fecal occult blood test (FOBT) every year  · Sigmoidoscopy every 5 years  · Colonoscopy every 10 years  If you are age 68 to 80, you can work with your doctor to decide if screening is a good option. If you are age 80 or older, your doctor will likely advise that screening is not helpful. Talk with your doctor about when you need to be tested. And discuss which tests are right for you. Your doctor may recommend earlier or more frequent testing if you:  · Have had colorectal cancer before. · Have had colon polyps. · Have symptoms of colorectal cancer. These include blood in your stool and changes in your bowel habits. · Have a parent, brother or sister, or child with colon polyps or colorectal cancer. · Have a bowel disease. This includes ulcerative colitis and Crohn's disease. · Have a rare polyp syndrome that runs in families, such as familial adenomatous polyposis (FAP). · Have had radiation treatments to the belly or pelvis. When should you call for help? Watch closely for changes in your health, and be sure to contact your doctor if:    · You have any changes in your bowel habits.     · You have any problems. Where can you learn more? Go to http://allie-natalya.info/. Enter M541 in the search box to learn more about \"Colon Cancer Screening: Care Instructions. \"  Current as of: March 27, 2018  Content Version: 11.9  © 4058-3236 [employer]. Care instructions adapted under license by [employer] (which disclaims liability or warranty for this information).  If you have questions about a medical condition or this instruction, always ask your healthcare professional. Zola Ormond disclaims any warranty or liability for your use of this information.

## 2019-01-24 NOTE — PROGRESS NOTES
[facility] Note    Lalit Shah is a 70 y.o. male who was seen in clinic today (1/24/2019). Subjective:  Cardiovascular Review:  The patient has no known cardiovascular conditions. Diet and Lifestyle: generally follows a low fat low cholesterol diet, generally follows a low sodium diet, exercises sporadically, smoker cigar once per month  Home BP Monitoring: is not measured at home. Pertinent ROS: taking medications as instructed, no medication side effects noted, no TIA's, no chest pain on exertion, no dyspnea on exertion, no swelling of ankles. Patient seen by ophthalmology, Dr Brandy Infante for left eye vision changes. Specialist requesting evaluation of carotids and echocardiogram to rule out embolism. Prior to Admission medications    Medication Sig Start Date End Date Taking? Authorizing Provider   varicella-zoster recombinant, PF, (SHINGRIX, PF,) 50 mcg/0.5 mL susr injection 0.5 mL by IntraMUSCular route once for 1 dose. Give second dose 2 months after first 1/24/19 1/24/19 Yes Judge Hans Rasheed NP   pneumococcal 13 john conj dip (PREVNAR-13) 0.5 mL syrg injection 0.5 mL by IntraMUSCular route PRIOR TO DISCHARGE. 1/24/19  Yes Judge Hans Rasheed NP   CHLORPHENIRAMINE/PHENYLEPHRINE (SINUS AND ALLERGY PE PO) Take  by mouth as needed. Yes Provider, Historical   fluticasone (FLONASE) 50 mcg/actuation nasal spray 2 Sprays by Both Nostrils route daily. Patient taking differently: 2 Sprays by Both Nostrils route as needed. 5/3/16  Yes Jaida Casas NP   tamsulosin (FLOMAX) 0.4 mg capsule Take 1 Cap by mouth daily. 8/6/18   Avinash Childs MD   aspirin delayed-release 81 mg tablet Take 81 mg by mouth daily. Provider, Historical          No Known Allergies        ROS  See HPI    Objective:   Physical Exam   Constitutional: He is oriented to person, place, and time. He appears well-developed and well-nourished. Neck: Normal range of motion. Neck supple. No JVD present. Carotid bruit is not present. No thyromegaly present. Cardiovascular: Normal rate, regular rhythm and intact distal pulses. Exam reveals no gallop and no friction rub. No murmur heard. Pulmonary/Chest: Effort normal and breath sounds normal. No respiratory distress. Musculoskeletal: He exhibits no edema. Lymphadenopathy:     He has no cervical adenopathy. Neurological: He is alert and oriented to person, place, and time. Psychiatric: He has a normal mood and affect. His behavior is normal.   Nursing note and vitals reviewed. Visit Vitals  /75 (BP 1 Location: Right arm, BP Patient Position: Sitting)   Pulse 84   Temp 97.6 °F (36.4 °C) (Oral)   Resp 18   Ht 6' 1\" (1.854 m)   Wt 197 lb 3.2 oz (89.4 kg)   SpO2 97%   BMI 26.02 kg/m²       Assessment & Plan:  Diagnoses and all orders for this visit:    1. Subjective visual disturbance of left eye  Carotid US and echocardiogram to rule out embolism. Will forward results to ophthalmology. -     DUPLEX CAROTID BILATERAL; Future  -     ECHO 2D ADULT; Future    2. Screen for colon cancer  Discussed need for colonoscopy as a screening for colon cancer.   -     REFERRAL TO GASTROENTEROLOGY    3. Need for vaccination with 13-polyvalent pneumococcal conjugate vaccine  -     varicella-zoster recombinant, PF, (SHINGRIX, PF,) 50 mcg/0.5 mL susr injection; 0.5 mL by IntraMUSCular route once for 1 dose. Give second dose 2 months after first  -     pneumococcal 13 john conj dip (PREVNAR-13) 0.5 mL syrg injection; 0.5 mL by IntraMUSCular route PRIOR TO DISCHARGE. I have discussed the diagnosis with the patient and the intended plan as seen in the above orders. The patient has received an after-visit summary along with patient information handout. I have discussed medication side effects and warnings with the patient as well. Follow-up Disposition:  Return in about 6 months (around 7/24/2019) for disease management.         John Craft Bouchra Falcon, NP

## 2019-01-24 NOTE — PROGRESS NOTES
Chief Complaint   Patient presents with    Follow-up     from [facility] , recommending cafrdiac echo and ultrasound    Hypertension       Reviewed Record in preparation for visit and have obtained necessary documentation. Identified pt with two pt identifiers (Name @ )    Health Maintenance Due   Topic    Shingrix Vaccine Age 50> (1 of 2)    GLAUCOMA SCREENING Q2Y     Pneumococcal 65+ Low/Medium Risk (2 of 2 - PCV13)    COLONOSCOPY          1. Have you been to the ER, urgent care clinic since your last visit? Hospitalized since your last visit? no    2. Have you seen or consulted any other health care providers outside of the [address] [place] [address] since your last visit? Include any pap smears or colon screening.  no

## 2019-01-30 ENCOUNTER — TELEPHONE (OUTPATIENT)
Dept: FAMILY MEDICINE CLINIC | Age: 69
End: 2019-01-30

## 2019-01-30 NOTE — TELEPHONE ENCOUNTER
[phone] spoke to Jodi Machado echo adult follow up or limited order is not covered by his insurance for ICD10 H53.10 and needs a new ICD10 codes

## 2019-01-30 NOTE — TELEPHONE ENCOUNTER
I had Haroldo Potter calling from [facility] in regards to the the Echo study order medical code. She said the medical code that they received for the echo study order is wrong. patient has an appt. Tomorrow at 10:30am and they are requesting a new order with the right medical code before the appt.       Best call# [phone]

## 2019-01-31 ENCOUNTER — HOSPITAL ENCOUNTER (OUTPATIENT)
Dept: VASCULAR SURGERY | Age: 69
Discharge: HOME | End: 2019-01-31
Payer: MEDICARE

## 2019-01-31 ENCOUNTER — APPOINTMENT (OUTPATIENT)
Dept: NON INVASIVE DIAGNOSTICS | Age: 69
End: 2019-01-31
Payer: MEDICARE

## 2019-01-31 DIAGNOSIS — H53.10 SUBJECTIVE VISUAL DISTURBANCE OF LEFT EYE: ICD-10-CM

## 2019-01-31 PROCEDURE — 93880 EXTRACRANIAL BILAT STUDY: CPT

## 2019-01-31 NOTE — TELEPHONE ENCOUNTER
[phone] spoke to Samara per Norberta Kawasaki changed ICD10 codes to  H34.9 and per Samara its good

## 2019-01-31 NOTE — TELEPHONE ENCOUNTER
Odalys Lanza from [facility] is waiting on the new diagnosis code for patients 2D ECHO   ICD-10-CM ICD-9-CM   Subjective visual disturbance of left eye  - Primary     H53.10 368.10     Was incorrect need new code.   Patient has appointment today at 10:30am  Best call back : [phone]

## 2019-02-01 LAB
LEFT CCA DIST DIAS: 21.6 CM/S
LEFT CCA DIST SYS: 72.2 CM/S
LEFT CCA PROX DIAS: 24.9 CM/S
LEFT CCA PROX SYS: 97.5 CM/S
LEFT ECA DIAS: 14.96 CM/S
LEFT ECA SYS: 81 CM/S
LEFT ICA DIST DIAS: 27.1 CM/S
LEFT ICA DIST SYS: 65.6 CM/S
LEFT ICA MID DIAS: 24.9 CM/S
LEFT ICA MID SYS: 62.3 CM/S
LEFT ICA PROX DIAS: 23.8 CM/S
LEFT ICA PROX SYS: 72.2 CM/S
LEFT ICA/CCA SYS: 1
LEFT VERTEBRAL DIAS: 14.48 CM/S
LEFT VERTEBRAL SYS: 55.5 CM/S
RIGHT CCA DIST DIAS: 20.4 CM/S
RIGHT CCA DIST SYS: 75.1 CM/S
RIGHT CCA PROX DIAS: 19 CM/S
RIGHT CCA PROX SYS: 83 CM/S
RIGHT ECA DIAS: 14.74 CM/S
RIGHT ECA SYS: 80.9 CM/S
RIGHT ICA DIST DIAS: 17.1 CM/S
RIGHT ICA DIST SYS: 57.7 CM/S
RIGHT ICA MID DIAS: 23.6 CM/S
RIGHT ICA MID SYS: 62 CM/S
RIGHT ICA PROX DIAS: 15.7 CM/S
RIGHT ICA PROX SYS: 53.3 CM/S
RIGHT ICA/CCA SYS: 0.8
RIGHT VERTEBRAL DIAS: 9.1 CM/S
RIGHT VERTEBRAL SYS: 30.3 CM/S

## 2019-02-01 NOTE — PROGRESS NOTES
[phone] attempted to call patient no answer left message to call me back in regards to [facility] results Faxed results to Dr. Jacinto Jordan [phone]

## 2019-02-01 NOTE — PROGRESS NOTES
Patient's carotid arteries were normal - no stenosis noted. Please fax results to Dr. Salas Hennessy - ophthalmology.

## 2019-08-31 ENCOUNTER — APPOINTMENT (OUTPATIENT)
Dept: GENERAL RADIOLOGY | Age: 69
End: 2019-08-31
Attending: EMERGENCY MEDICINE
Payer: MEDICARE

## 2019-08-31 ENCOUNTER — HOSPITAL ENCOUNTER (OUTPATIENT)
Age: 69
Setting detail: OBSERVATION
Discharge: HOME | End: 2019-09-01
Attending: EMERGENCY MEDICINE | Admitting: INTERNAL MEDICINE
Payer: MEDICARE

## 2019-08-31 DIAGNOSIS — R55 SYNCOPE AND COLLAPSE: Primary | ICD-10-CM

## 2019-08-31 LAB
ALBUMIN SERPL-MCNC: 3.7 G/DL (ref 3.5–5)
ALBUMIN/GLOB SERPL: 1.4 {RATIO} (ref 1.1–2.2)
ALP SERPL-CCNC: 61 U/L (ref 45–117)
ALT SERPL-CCNC: 20 U/L (ref 12–78)
ANION GAP SERPL CALC-SCNC: 6 MMOL/L (ref 5–15)
AST SERPL-CCNC: 16 U/L (ref 15–37)
BASOPHILS # BLD: 0.1 K/UL (ref 0–0.1)
BASOPHILS NFR BLD: 1 % (ref 0–1)
BILIRUB SERPL-MCNC: 0.5 MG/DL (ref 0.2–1)
BUN SERPL-MCNC: 17 MG/DL (ref 6–20)
BUN/CREAT SERPL: 14 (ref 12–20)
CALCIUM SERPL-MCNC: 8.6 MG/DL (ref 8.5–10.1)
CHLORIDE SERPL-SCNC: 112 MMOL/L (ref 97–108)
CO2 SERPL-SCNC: 26 MMOL/L (ref 21–32)
COMMENT, HOLDF: NORMAL
CREAT SERPL-MCNC: 1.22 MG/DL (ref 0.7–1.3)
DIFFERENTIAL METHOD BLD: NORMAL
EOSINOPHIL # BLD: 0.2 K/UL (ref 0–0.4)
EOSINOPHIL NFR BLD: 3 % (ref 0–7)
ERYTHROCYTE [DISTWIDTH] IN BLOOD BY AUTOMATED COUNT: 13.3 % (ref 11.5–14.5)
GLOBULIN SER CALC-MCNC: 2.6 G/DL (ref 2–4)
GLUCOSE SERPL-MCNC: 110 MG/DL (ref 65–100)
HCT VFR BLD AUTO: 42.8 % (ref 36.6–50.3)
HGB BLD-MCNC: 14.1 G/DL (ref 12.1–17)
IMM GRANULOCYTES # BLD AUTO: 0 K/UL (ref 0–0.04)
IMM GRANULOCYTES NFR BLD AUTO: 0 % (ref 0–0.5)
LYMPHOCYTES # BLD: 2.1 K/UL (ref 0.8–3.5)
LYMPHOCYTES NFR BLD: 25 % (ref 12–49)
MCH RBC QN AUTO: 32.3 PG (ref 26–34)
MCHC RBC AUTO-ENTMCNC: 32.9 G/DL (ref 30–36.5)
MCV RBC AUTO: 97.9 FL (ref 80–99)
MONOCYTES # BLD: 0.7 K/UL (ref 0–1)
MONOCYTES NFR BLD: 8 % (ref 5–13)
NEUTS SEG # BLD: 5.2 K/UL (ref 1.8–8)
NEUTS SEG NFR BLD: 63 % (ref 32–75)
NRBC # BLD: 0 K/UL (ref 0–0.01)
NRBC BLD-RTO: 0 PER 100 WBC
PLATELET # BLD AUTO: 204 K/UL (ref 150–400)
PMV BLD AUTO: 10.9 FL (ref 8.9–12.9)
POTASSIUM SERPL-SCNC: 3.4 MMOL/L (ref 3.5–5.1)
PROT SERPL-MCNC: 6.3 G/DL (ref 6.4–8.2)
RBC # BLD AUTO: 4.37 M/UL (ref 4.1–5.7)
SAMPLES BEING HELD,HOLD: NORMAL
SODIUM SERPL-SCNC: 144 MMOL/L (ref 136–145)
TROPONIN I SERPL-MCNC: <0.05 NG/ML
WBC # BLD AUTO: 8.2 K/UL (ref 4.1–11.1)

## 2019-08-31 PROCEDURE — 99218 HC RM OBSERVATION: CPT

## 2019-08-31 PROCEDURE — 80053 COMPREHEN METABOLIC PANEL: CPT

## 2019-08-31 PROCEDURE — 99285 EMERGENCY DEPT VISIT HI MDM: CPT

## 2019-08-31 PROCEDURE — 84484 ASSAY OF TROPONIN QUANT: CPT

## 2019-08-31 PROCEDURE — 85025 COMPLETE CBC W/AUTO DIFF WBC: CPT

## 2019-08-31 PROCEDURE — 36415 COLL VENOUS BLD VENIPUNCTURE: CPT

## 2019-08-31 PROCEDURE — 93005 ELECTROCARDIOGRAM TRACING: CPT

## 2019-08-31 PROCEDURE — 71046 X-RAY EXAM CHEST 2 VIEWS: CPT

## 2019-09-01 ENCOUNTER — APPOINTMENT (OUTPATIENT)
Dept: NON INVASIVE DIAGNOSTICS | Age: 69
End: 2019-09-01
Attending: INTERNAL MEDICINE
Payer: MEDICARE

## 2019-09-01 VITALS
DIASTOLIC BLOOD PRESSURE: 85 MMHG | SYSTOLIC BLOOD PRESSURE: 133 MMHG | RESPIRATION RATE: 16 BRPM | TEMPERATURE: 98 F | HEIGHT: 73 IN | WEIGHT: 190.7 LBS | HEART RATE: 79 BPM | OXYGEN SATURATION: 96 % | BODY MASS INDEX: 25.27 KG/M2

## 2019-09-01 LAB
ANION GAP SERPL CALC-SCNC: 6 MMOL/L (ref 5–15)
ATRIAL RATE: 81 BPM
BUN SERPL-MCNC: 17 MG/DL (ref 6–20)
BUN/CREAT SERPL: 17 (ref 12–20)
CALCIUM SERPL-MCNC: 8.7 MG/DL (ref 8.5–10.1)
CALCULATED P AXIS, ECG09: 32 DEGREES
CALCULATED R AXIS, ECG10: -3 DEGREES
CALCULATED T AXIS, ECG11: 21 DEGREES
CHLORIDE SERPL-SCNC: 113 MMOL/L (ref 97–108)
CO2 SERPL-SCNC: 26 MMOL/L (ref 21–32)
CREAT SERPL-MCNC: 1.02 MG/DL (ref 0.7–1.3)
DIAGNOSIS, 93000: NORMAL
ECHO AO ROOT DIAM: 3.67 CM
ECHO AV AREA PEAK VELOCITY: 2 CM2
ECHO AV AREA/BSA PEAK VELOCITY: 1.1 CM2/M2
ECHO AV PEAK GRADIENT: 7.9 MMHG
ECHO AV PEAK VELOCITY: 140.22 CM/S
ECHO AV REGURGITANT PHT: 591 CM
ECHO LA AREA 4C: 19.7 CM2
ECHO LA MAJOR AXIS: 3.61 CM
ECHO LA TO AORTIC ROOT RATIO: 0.98
ECHO LA VOL 2C: 56.85 ML (ref 18–58)
ECHO LA VOL 4C: 54.27 ML (ref 18–58)
ECHO LA VOL BP: 61.78 ML (ref 18–58)
ECHO LA VOL/BSA BIPLANE: 29.3 ML/M2 (ref 16–28)
ECHO LA VOLUME INDEX A2C: 26.96 ML/M2 (ref 16–28)
ECHO LA VOLUME INDEX A4C: 25.74 ML/M2 (ref 16–28)
ECHO LV E' LATERAL VELOCITY: 9.91 CM/S
ECHO LV E' SEPTAL VELOCITY: 8.25 CM/S
ECHO LV INTERNAL DIMENSION DIASTOLIC: 4.62 CM (ref 4.2–5.9)
ECHO LV INTERNAL DIMENSION SYSTOLIC: 3.46 CM
ECHO LV IVSD: 1.12 CM (ref 0.6–1)
ECHO LV MASS 2D: 215.8 G (ref 88–224)
ECHO LV MASS INDEX 2D: 102.3 G/M2 (ref 49–115)
ECHO LV POSTERIOR WALL DIASTOLIC: 1.1 CM (ref 0.6–1)
ECHO LVOT DIAM: 2.09 CM
ECHO LVOT PEAK GRADIENT: 2.8 MMHG
ECHO LVOT PEAK VELOCITY: 83.68 CM/S
ECHO MV A VELOCITY: 75.73 CM/S
ECHO MV AREA PHT: 4.2 CM2
ECHO MV E DECELERATION TIME (DT): 181.3 MS
ECHO MV E VELOCITY: 58.18 CM/S
ECHO MV E/A RATIO: 0.77
ECHO MV E/E' LATERAL: 5.87
ECHO MV E/E' RATIO (AVERAGED): 6.46
ECHO MV E/E' SEPTAL: 7.05
ECHO MV PRESSURE HALF TIME (PHT): 52.6 MS
ECHO MV REGURGITANT PEAK GRADIENT: 22.8 MMHG
ECHO MV REGURGITANT PEAK VELOCITY: 238.67 CM/S
ECHO PV MAX VELOCITY: 80.79 CM/S
ECHO PV PEAK GRADIENT: 2.6 MMHG
ECHO RV INTERNAL DIMENSION: 3.38 CM
ECHO TV REGURGITANT MAX VELOCITY: 237.03 CM/S
ECHO TV REGURGITANT PEAK GRADIENT: 22.5 MMHG
GLUCOSE SERPL-MCNC: 99 MG/DL (ref 65–100)
P-R INTERVAL, ECG05: 162 MS
PISA AR MAX VEL: 296.71 CM/S
POTASSIUM SERPL-SCNC: 3.6 MMOL/L (ref 3.5–5.1)
Q-T INTERVAL, ECG07: 370 MS
QRS DURATION, ECG06: 92 MS
QTC CALCULATION (BEZET), ECG08: 429 MS
SODIUM SERPL-SCNC: 145 MMOL/L (ref 136–145)
TROPONIN I SERPL-MCNC: <0.05 NG/ML
VENTRICULAR RATE, ECG03: 81 BPM

## 2019-09-01 PROCEDURE — 74011250636 HC RX REV CODE- 250/636: Performed by: INTERNAL MEDICINE

## 2019-09-01 PROCEDURE — 36415 COLL VENOUS BLD VENIPUNCTURE: CPT

## 2019-09-01 PROCEDURE — 84484 ASSAY OF TROPONIN QUANT: CPT

## 2019-09-01 PROCEDURE — 80048 BASIC METABOLIC PNL TOTAL CA: CPT

## 2019-09-01 PROCEDURE — 74011250637 HC RX REV CODE- 250/637: Performed by: INTERNAL MEDICINE

## 2019-09-01 PROCEDURE — 99218 HC RM OBSERVATION: CPT

## 2019-09-01 PROCEDURE — 93306 TTE W/DOPPLER COMPLETE: CPT

## 2019-09-01 RX ORDER — SODIUM CHLORIDE 0.9 % (FLUSH) 0.9 %
5-40 SYRINGE (ML) INJECTION EVERY 8 HOURS
Status: DISCONTINUED | OUTPATIENT
Start: 2019-09-01 | End: 2019-09-01 | Stop reason: HOSPADM

## 2019-09-01 RX ORDER — ASPIRIN 81 MG/1
81 TABLET ORAL DAILY
Status: DISCONTINUED | OUTPATIENT
Start: 2019-09-01 | End: 2019-09-01 | Stop reason: HOSPADM

## 2019-09-01 RX ORDER — SODIUM CHLORIDE 0.9 % (FLUSH) 0.9 %
5-40 SYRINGE (ML) INJECTION AS NEEDED
Status: DISCONTINUED | OUTPATIENT
Start: 2019-09-01 | End: 2019-09-01 | Stop reason: HOSPADM

## 2019-09-01 RX ORDER — FLUTICASONE PROPIONATE 50 MCG
2 SPRAY, SUSPENSION (ML) NASAL DAILY
Status: DISCONTINUED | OUTPATIENT
Start: 2019-09-01 | End: 2019-09-01 | Stop reason: HOSPADM

## 2019-09-01 RX ORDER — ENOXAPARIN SODIUM 100 MG/ML
40 INJECTION SUBCUTANEOUS EVERY 24 HOURS
Status: DISCONTINUED | OUTPATIENT
Start: 2019-09-01 | End: 2019-09-01 | Stop reason: HOSPADM

## 2019-09-01 RX ORDER — SODIUM CHLORIDE 9 MG/ML
100 INJECTION, SOLUTION INTRAVENOUS CONTINUOUS
Status: DISCONTINUED | OUTPATIENT
Start: 2019-09-01 | End: 2019-09-01

## 2019-09-01 RX ADMIN — Medication 10 ML: at 13:00

## 2019-09-01 RX ADMIN — SODIUM CHLORIDE 100 ML/HR: 900 INJECTION, SOLUTION INTRAVENOUS at 00:50

## 2019-09-01 RX ADMIN — ASPIRIN 81 MG: 81 TABLET, COATED ORAL at 08:38

## 2019-09-01 RX ADMIN — Medication 10 ML: at 00:50

## 2019-09-01 NOTE — DISCHARGE SUMMARY
Discharge Summary       PATIENT ID: Hussein Wan  MRN: [id number]   YOB: 1950    DATE OF ADMISSION: 8/31/2019  8:42 PM    DATE OF DISCHARGE: 9/1/2019  PRIMARY CARE PROVIDER: Magdy Hannah NP     ATTENDING PHYSICIAN: Garret Mojica MD  DISCHARGING PROVIDER: Garret Mojica MD    To contact this individual call [phone] and ask the  to page. If unavailable ask to be transferred the Adult Hospitalist Department. CONSULTATIONS: None    PROCEDURES/SURGERIES: * No surgery found *    ADMITTING DIAGNOSES & HOSPITAL COURSE:     Syncope -suspect due to dehydration. Dehydration - hydrated w/ IV fluids  ONUR - suspected on admission, but ruled out. [address] course. No dizziness. No cp/sob/abd pain. Suspect syncope related to dehydration. Patient encouraged drink more water. Echo done today. [place] to be medically stable for dc. DISCHARGE DIAGNOSES / PLAN:      Syncope -suspect due to dehydration. Dehydration - hydrated w/ IV fluids  ONUR - suspected on admission, but ruled out. OA       PENDING TEST RESULTS:   At the time of discharge the following test results are still pending:  Echo    FOLLOW UP APPOINTMENTS:    Follow-up Information     Follow up With Specialties Details Why Contact Info    Magdy Hannah NP Nurse Practitioner In 1 week  [address] [id number]             ADDITIONAL CARE RECOMMENDATIONS:  No driving for now. Needs to follow up with PCP. DIET: Regular Diet  Oral Nutritional Supplements: No Oral Supplement prescribednone    ACTIVITY: Activity as tolerated and no driving. WOUND CARE: none     EQUIPMENT needed: none       DISCHARGE MEDICATIONS:  Current Discharge Medication List      CONTINUE these medications which have NOT CHANGED    Details   tamsulosin (FLOMAX) 0.4 mg capsule Take 1 Cap by mouth daily.   Qty: 90 Cap, Refills: 0    Associated Diagnoses: Benign prostatic hyperplasia, unspecified whether lower urinary tract symptoms present      CHLORPHENIRAMINE/PHENYLEPHRINE (SINUS AND ALLERGY PE PO) Take  by mouth as needed. fluticasone (FLONASE) 50 mcg/actuation nasal spray 2 Sprays by Both Nostrils route daily. Qty: 1 Bottle, Refills: 11    Associated Diagnoses: Other seasonal allergic rhinitis      aspirin delayed-release 81 mg tablet Take 81 mg by mouth daily. STOP taking these medications       pneumococcal 13 john conj dip (PREVNAR-13) 0.5 mL syrg injection Comments:   Reason for Stopping:                 NOTIFY YOUR PHYSICIAN FOR ANY OF THE FOLLOWING:   Fever over 101 degrees for 24 hours. Chest pain, shortness of breath, fever, chills, nausea, vomiting, diarrhea, change in mentation, falling, weakness, bleeding. Severe pain or pain not relieved by medications. Or, any other signs or symptoms that you may have questions about. DISPOSITION:  x  Home With:   OT  PT  HH  RN       Long term SNF/Inpatient Rehab    Independent/assisted living    Hospice    Other:       PATIENT CONDITION AT DISCHARGE:     Functional status    Poor     Deconditioned    x Independent      Cognition   x  Lucid     Forgetful     Dementia      Catheters/lines (plus indication)    [place]     PICC     PEG    x None      Code status   x  Full code     DNR      PHYSICAL EXAMINATION AT DISCHARGE:   Refer to Progress Note   Patient seen and examined. See progress note.      CHRONIC MEDICAL DIAGNOSES:  Problem List as of 9/1/2019 Date Reviewed: 8/31/2019          Codes Class Noted - Resolved    * (Principal) Syncope ICD-10-CM: R55  ICD-9-CM: 780.2  8/31/2019 - Present        Right inguinal hernia ICD-10-CM: K40.90  ICD-9-CM: 550.90  5/3/2016 - Present        ACP (advance care planning) ICD-10-CM: Z71.89  ICD-9-CM: V65.49  5/3/2016 - Present        AR (allergic rhinitis) ICD-10-CM: J30.9  ICD-9-CM: 477.9  7/2/2010 - Present        Anxiety disorder ICD-10-CM: F41.9  ICD-9-CM: 300.00  7/2/2010 - Present              Addendum 9/4/19:  Echo:  · Left Ventricle: Normal cavity size, wall thickness and systolic function (ejection fraction normal). Estimated left ventricular ejection fraction is 56 - 60%. Calculated left ventricular ejection fraction is 50%. No regional wall motion abnormality noted. Mild (grade 1) left ventricular diastolic dysfunction. · Aortic Valve: Mild aortic valve sclerosis. Aortic valve area is 2 cm2. · Tricuspid Valve: Mild tricuspid valve regurgitation is present.       20 minutes were spent with the patient on counseling and coordination of care    Signed:   Osmar Blevins MD  9/1/2019  4:59 PM

## 2019-09-01 NOTE — ROUTINE PROCESS
TRANSFER - IN REPORT:    Verbal report received from John Kasper, [facility] (name) on Vipul Click  being received from [facility] (unit) for routine progression of care      Report consisted of patients Situation, Background, Assessment and   Recommendations(SBAR). Information from the following report(s) SBAR, ED Summary, Procedure Summary and Recent Results was reviewed with the receiving nurse. Opportunity for questions and clarification was provided. Assessment completed upon patients arrival to unit and care assumed.

## 2019-09-01 NOTE — ED PROVIDER NOTES
78 y.o. male with past medical history significant for arthritis, anxiety who presents via EMS with chief complaint of syncope. Pt reports that he was standing in the kitchen to get himself a drink when he felt \"flushed\" and had a syncopal episode. Pt's wife reports that he was unresponsive for a few seconds and was diaphoretic. Pt had a hx of similar sx 4 weeks ago and was seen in the ED and told it was a vasovagal episode. Pt denies hx of syncopal episodes prior to that episode. Pt denies any new medications at this time. Pt had reportedly just started flomax at the time of the last episode, but reports that he is no longer taking it. Pt denies CP, SOB, lightheadedness, dizziness, nausea, vomiting, urinary sx, black or bloody stool. There are no other acute medical concerns at this time. Social hx: former tobacco smoker, +EtOH consumption     PCP: Raymond Lima NP      Note written by Sienna Champagne, as dictated by Lj Marin MD 8:45 PM      The history is provided by the patient. No  was used.         Past Medical History:   Diagnosis Date    Allergic rhinitis, cause unspecified     Anxiety state, unspecified     Degenerative arthritis of lumbar spine 9/12    Eustachian tube dysfunction        Past Surgical History:   Procedure Laterality Date    ENDOSCOPY, COLON, DIAGNOSTIC  2007    repeat 2014    HX APPENDECTOMY  1953         HX HERNIA REPAIR  2/85    HX KNEE ARTHROSCOPY  6/02    HX ORTHOPAEDIC  10/75    knee repair         Family History:   Problem Relation Age of Onset    Heart Disease Mother         CAD s/p CABG 66's    Cancer Father     Alcohol abuse Father        Social History     Socioeconomic History    Marital status:      Spouse name: Not on file    Number of children: Not on file    Years of education: Not on file    Highest education level: Not on file   Occupational History    Occupation:  ed   Social Needs    Financial resource strain: Not on file    Food insecurity:     Worry: Not on file     Inability: Not on file    Transportation needs:     Medical: Not on file     Non-medical: Not on file   Tobacco Use    Smoking status: Former Smoker     Years: 10.00     Types: Cigars    Smokeless tobacco: Never Used    Tobacco comment: off and on   Substance and Sexual Activity    Alcohol use: Yes    Drug use: No    Sexual activity: Yes     Partners: Female   Lifestyle    Physical activity:     Days per week: Not on file     Minutes per session: Not on file    Stress: Not on file   Relationships    Social connections:     Talks on phone: Not on file     Gets together: Not on file     Attends [employer] service: Not on file     Active member of club or organization: Not on file     Attends meetings of clubs or organizations: Not on file     Relationship status: Not on file    Intimate partner violence:     Fear of current or ex partner: Not on file     Emotionally abused: Not on file     Physically abused: Not on file     Forced sexual activity: Not on file   Other Topics Concern     Service No    Blood Transfusions No    Caffeine Concern No    Occupational Exposure No    Hobby Hazards No    Sleep Concern No    Stress Concern No    Weight Concern No    Special Diet No    Back Care No    Exercise Yes     Comment: prn    Bike Helmet No    Seat Belt Yes    Self-Exams Yes   Social History Narrative    Not on file         ALLERGIES: Patient has no known allergies. Review of Systems   Constitutional: Negative for chills and fever. HENT: Negative for congestion. Respiratory: Negative for cough and shortness of breath. Cardiovascular: Negative for chest pain. Gastrointestinal: Negative for abdominal pain, nausea and vomiting. Genitourinary: Negative for difficulty urinating and dysuria. Musculoskeletal: Negative for neck pain. Neurological: Positive for syncope.  Negative for dizziness, light-headedness and headaches. All other systems reviewed and are negative. There were no vitals filed for this visit. Physical Exam     Vital signs reviewed. Nursing notes reviewed. Const:  No acute distress, well developed, well nourished  Head:  Atraumatic, normocephalic  Eyes:  PERRL, conjunctiva normal, no scleral icterus  Neck:  Supple, trachea midline  Cardiovascular:  RRR, no murmurs, no gallops, no rubs  Resp:  No resp distress, no increased work of breathing, no wheezes, no rhonchi, no rales,  Abd:  Soft, non-tender, non-distended, no rebound, no guarding, no CVA tenderness  MSK:  No pedal edema, normal ROM  Neuro:  Alert and oriented x3, no cranial nerve defect  Skin:  Warm, dry, intact  Psych: normal mood and affect, behavior is normal, judgement and thought content is normal          MDM  Number of Diagnoses or Management Options     Amount and/or Complexity of Data Reviewed  Clinical lab tests: ordered and reviewed  Tests in the radiology section of CPT®: reviewed and ordered  Review and summarize past medical records: yes    Patient Progress  Patient progress: stable          Mr. Sam Rosado is a 8year-old male who comes in after syncopal event. Unclear cause of his syncope. Patient's syncope does not appear to be vasovagal.  No arrhythmias on EKG. The second time he has passed out like this before. Has never had a work-up for before.   Patient be evaluated for admission by the hospitalist.    Procedures

## 2019-09-01 NOTE — PROGRESS NOTES
Bedside RN performed patient education and medication education. Discharge concerns initiated and discussed with patient, including clarification on \"who\" assists the patient at their home and instructions for when the home going patient should call their provider after discharge. Opportunity for questions and clarification was provided. Patient receptive to education: YES  Patient stated: verbal w/ teachback  Barriers to Education: none  Diagnosis Education given:  YES    Length of stay: 0  Expected Day of Discharge: 0  Ask if they have \"Help at Home\" & add to white board?   YES    Education Day #: 1    Medication Education Given:  YES  M in the box Medication name: aspirin    Pt aware of HCAHPS survey: YES    Pt refused wheelchair, exited building w/ spouse, pt stable, no acute signs of distress

## 2019-09-01 NOTE — ED TRIAGE NOTES
Triage Note:  Patient arrives from home via EMS after he became flushed and clammy then had syncopal episode. This is the patient's second episode within las couple of months. Patient states afterward he was \"okay\". Patient is A&O x4.

## 2019-09-01 NOTE — PROGRESS NOTES
Echo tech jamie notified of pending echo/discharge, states unsure of procedure time    1400 per jamie,  no eta on echo study, tech aware of pending discharge    1440 dr Ida Zapien notified of delay in echo, no eta available, per md ok to discharge pt if study not completed by 1600    1600 echo in progress

## 2019-09-01 NOTE — DISCHARGE INSTRUCTIONS
Patient Education        Fainting: Care Instructions  Your Care Instructions    When you faint, or pass out, you lose consciousness for a short time. A brief drop in blood flow to the brain often causes it. When you fall or lie down, more blood flows to your brain and you regain consciousness. Emotional stress, pain, or overheating--especially if you have been standing--can make you faint. In these cases, fainting is usually not serious. But fainting can be a sign of a more serious problem. Your doctor may want you to have more tests to rule out other causes. The treatment you need depends on the reason why you fainted. The doctor has checked you carefully, but problems can develop later. If you notice any problems or new symptoms, get medical treatment right away. Follow-up care is a key part of your treatment and safety. Be sure to make and go to all appointments, and call your doctor if you are having problems. It's also a good idea to know your test results and keep a list of the medicines you take. How can you care for yourself at home? · Drink plenty of fluids to prevent dehydration. If you have kidney, heart, or liver disease and have to limit fluids, talk with your doctor before you increase your fluid intake. When should you call for help? Call 911 anytime you think you may need emergency care. For example, call if:    · You have symptoms of a heart problem. These may include:  ? Chest pain or pressure. ? Severe trouble breathing. ? A fast or irregular heartbeat. ? Lightheadedness or sudden weakness. ? Coughing up pink, foamy mucus. ? Passing out. After you call 911, the  may tell you to chew 1 adult-strength or 2 to 4 low-dose aspirin. Wait for an ambulance. Do not try to drive yourself.     · You have symptoms of a stroke. These may include:  ? Sudden numbness, tingling, weakness, or loss of movement in your face, arm, or leg, especially on only one side of your body.   ? Sudden vision changes. ? Sudden trouble speaking. ? Sudden confusion or trouble understanding simple statements. ? Sudden problems with walking or balance. ? A sudden, severe headache that is different from past headaches.     · You passed out (lost consciousness) again.    Watch closely for changes in your health, and be sure to contact your doctor if:    · You do not get better as expected. Where can you learn more? Go to http://allie-natalya.info/. Enter H744 in the search box to learn more about \"Fainting: Care Instructions. \"  Current as of: September 23, 2018  Content Version: 12.1  © 6089-8769 [employer]. Care instructions adapted under license by [employer] (which disclaims liability or warranty for this information). If you have questions about a medical condition or this instruction, always ask your healthcare professional. [address] any warranty or liability for your use of this information. No driving at this time.

## 2019-09-01 NOTE — PROGRESS NOTES
Hospitalist Progress Note  Goldy Regalado MD  Answering service: [phone] from in house phone      Date of Service:  2019  NAME:  Cesilia Reyes  :  1950  MRN:  [id number]      Admission Summary:   77 yo male w/ pmh OA who presented to hospital due to syncope episode while at home. No seizure activity noted. No cp/sob/abd pain. Interval history / Subjective:   Feels fine. Wants to go home. No cp/sob/abd pain. No headaches. No dizziness. Assessment & Plan:     Syncope -suspect due to dehydration. Dehydration - hydrated w/ IV fluids  ONUR - suspected on admission, but ruled out. OA    Code status: full   DVT prophylaxis: Lovenox    Plan:  Dc IV fluids  Dc home once echo done. Care Plan discussed with: Patient/Family and Nurse  Disposition: Home w/Family     Hospital Problems  Date Reviewed: 2019          Codes Class Noted POA    * (Principal) Syncope ICD-10-CM: R55  ICD-9-CM: 780.2  2019 Unknown                Review of Systems:   A comprehensive review of systems was negative except for that written in the HPI. Vital Signs:    Last 24hrs VS reviewed since prior progress note. Most recent are:  Visit Vitals  /85 (BP Patient Position: Standing)   Pulse 79   Temp 98 °F (36.7 °C)   Resp 16   Ht 6' 1\" (1.854 m)   Wt 86.5 kg (190 lb 11.2 oz)   SpO2 96%   BMI 25.16 kg/m²       No intake or output data in the 24 hours ending 19 1405     Physical Examination:             Constitutional:  No acute distress, cooperative, pleasant    ENT:  Oral mucous moist, oropharynx benign. Neck supple,    Resp:  CTA bilaterally. No wheezing/rhonchi/rales. No accessory muscle use   CV:  Regular rhythm, normal rate, no murmurs, gallops, rubs    GI:  Soft, non distended, non tender.  normoactive bowel sounds, no hepatosplenomegaly     Musculoskeletal:  No edema, warm, 2+ pulses throughout    Neurologic:  Moves all extremities. AAOx3, CN II-XII reviewed     Psych:  Good insight, Not anxious nor agitated. Data Review:    I personally reviewed  labs      Labs:     Recent Labs     08/31/19 2052   WBC 8.2   HGB 14.1   HCT 42.8        Recent Labs     09/01/19 0436 08/31/19 2052    144   K 3.6 3.4*   * 112*   CO2 26 26   BUN 17 17   CREA 1.02 1.22   GLU 99 110*   CA 8.7 8.6     Recent Labs     08/31/19 2052   SGOT 16   ALT 20   AP 61   TBILI 0.5   TP 6.3*   ALB 3.7   GLOB 2.6     No results for input(s): INR, PTP, APTT in the last 72 hours. No lab exists for component: INREXT   No results for input(s): FE, TIBC, PSAT, FERR in the last 72 hours. No results found for: FOL, RBCF   No results for input(s): PH, PCO2, PO2 in the last 72 hours.   Recent Labs     09/01/19 0436 08/31/19 2052   TROIQ <0.05 <0.05     Lab Results   Component Value Date/Time    Cholesterol, total 161 10/17/2018 12:17 PM    HDL Cholesterol 56 10/17/2018 12:17 PM    LDL, calculated 86 10/17/2018 12:17 PM    Triglyceride 96 10/17/2018 12:17 PM     No results found for: GLUCPOC  Lab Results   Component Value Date/Time    Color YELLOW/STRAW 08/05/2018 11:20 AM    Appearance CLEAR 08/05/2018 11:20 AM    Specific gravity 1.025 08/05/2018 11:20 AM    pH (UA) 5.5 08/05/2018 11:20 AM    Protein NEGATIVE  08/05/2018 11:20 AM    Glucose NEGATIVE  08/05/2018 11:20 AM    Ketone NEGATIVE  08/05/2018 11:20 AM    Bilirubin NEGATIVE  08/05/2018 11:20 AM    Urobilinogen 0.2 08/05/2018 11:20 AM    Nitrites NEGATIVE  08/05/2018 11:20 AM    Leukocyte Esterase NEGATIVE  08/05/2018 11:20 AM    Epithelial cells FEW 08/05/2018 11:20 AM    Bacteria NEGATIVE  08/05/2018 11:20 AM    WBC 0-4 08/05/2018 11:20 AM    RBC 0-5 08/05/2018 11:20 AM         Medications Reviewed:     Current Facility-Administered Medications   Medication Dose Route Frequency    aspirin delayed-release tablet 81 mg  81 mg Oral DAILY    fluticasone propionate (FLONASE) 50 mcg/actuation nasal spray 2 Spray  2 Spray Both Nostrils DAILY    sodium chloride (NS) flush 5-40 mL  5-40 mL IntraVENous Q8H    sodium chloride (NS) flush 5-40 mL  5-40 mL IntraVENous PRN    enoxaparin (LOVENOX) injection 40 mg  40 mg SubCUTAneous Q24H     ______________________________________________________________________  EXPECTED LENGTH OF STAY: - - -  ACTUAL LENGTH OF STAY:          0                 Garret Mojica MD

## 2019-09-01 NOTE — H&P
[address]  HISTORY AND PHYSICAL    Name:  Catracho Macias  MR#:  [id number]  :  1950  ACCOUNT #:  [de-identified]  ADMIT DATE:  2019      TIME OF SERVICE:  The patient is seen on 2019 at 2311. PRIMARY CARE PHYSICIAN:  Fang Keith NP    PRESENTING COMPLAINT:  Syncope. HISTORY OF PRESENTING COMPLAINT:  The patient is a 43-year-old male with past medical history of osteoarthritis and anxiety, who was brought to the emergency room via EMS with complaints of syncope. The patient reports that he was standing in the kitchen to get himself a drink when he felt flushed and had a syncopal episode. The patient's wife reported that he was unresponsive for a few seconds and was diaphoretic. The patient had similar episode prior about a year ago and was seen in the emergency room and was told it was a vasovagal episode. At that time, the patient reported that he was started on a new medication Flomax, but the patient states that he is no longer taking Flomax. The patient reported feeling lightheaded prior to passing out. He denied hitting his head. He also denies seizures, neck pain, neck stiffness or confusion. There is no report of chest pain, chest tightness, shortness of breath, dyspnea on exertion, PND or orthopnea. The patient denied diaphoresis, wheezing or rhonchi. He also denies nausea, vomiting, abdominal pain, constipation or diarrhea. There is no headache. The patient also has no report of any visual changes. There is no report of dysuria, frequency, hematuria, urethral discharge. The patient has no lower extremity pain or swelling. He has no back pain or flank pain. He denied any report of any skin lesions. Spouse and family reported the patient does not drink fluids and that he has been walking 6 days a week over the past several weeks. The patient states that he has taken less than half a glass of fluid today.     PAST MEDICAL HISTORY:  1. Allergic rhinitis. 2.  Anxiety disorder. 3.  Degenerative arthritis of lumbar spine. 4.  Eustachian tube dysfunction. PAST SURGICAL HISTORY:  1. Endoscopy. 2.  Appendectomy. 3.  Hernia repair. 4.  Knee arthroscopy and repair. MEDICATIONS:  Aspirin 81 mg daily, fluticasone 2 sprays both nostrils daily, tamsulosin 0.4 mg daily, the patient is not taking this medication. ALLERGIES:  NO KNOWN DRUG ALLERGIES. SOCIAL HISTORY:  The patient is , lives with spouse and he is a former smoker. The patient used to use cigarettes. He denies alcohol or recreational drug use. FAMILY HISTORY:  Heart disease, coronary artery disease status post CABG in the mother in her 76s. Cancer, father and alcohol abuse, father. REVIEW OF SYSTEMS:  More than 12 systems reviewed and the pertinent positives are in the history of present illness. All others are negative. PHYSICAL EXAMINATION:  GENERAL:  On examination, the patient is seen lying in bed in no acute respiratory distress. VITAL SIGNS:  Blood pressure 122/72, pulse 81, temperature 98.3, respirations 16, pulse ox 99% on room air. HEAD, EARS, NOSE AND THROAT:  Atraumatic, normocephalic. Anicteric. No pallor. No jaundice. Extraocular muscles intact. Pupils bilaterally reactive, equal, round. NECK:  Supple. No JVD. No carotid bruit. HEART:  Sounds 1 and 2. Regular rate and rhythm. No gallop, no friction, no rub. RESPIRATION:  No wheezing. No rhonchi. No rales. ABDOMEN:  Soft, nontender. Bowel sounds normoactive. NEUROLOGIC:  Alert and oriented x2. Cranial nerves II-XII intact. SKIN:  Warm and dry. Normal skin color. Normal skin turgor. PSYCH:  Normal mood, normal affect. BACK:  No CVA tenderness. EXTREMITIES:  No edema. No cyanosis. Normal range of motion. DIAGNOSTIC TESTS:  WBC 8.2, hemoglobin 14.1, hematocrit 42.8, platelet 828.   Sodium 144, potassium 3.4, chloride 112, carbon dioxide 26, glucose 110, BUN 17, creatinine 1.22, calcium 8.6. ALT 20, AST 16, alk phos 61. Troponin negative. Chest x-ray showed no acute intrathoracic disease. EKG normal sinus rhythm at 81 beats per minute. ASSESSMENT:  1. Syncope. 2.  Dehydration with possible ONUR. 3.  Anxiety disorder. 4.  Degenerative arthritis of lumbar spine. 5.  Eustachian tube dysfunction. 6.  Allergy rhinitis. PLAN:  1. To admit the patient to Telemetry under hospitalist service. 2.  Serial cardiac enzymes. 3.  Echocardiogram.  4.  IV fluids. 5.  Monitor electrolytes with hydration. 6.  Monitor kidney function with hydration. 7.  Telemetry monitor. 8.  Orthostatic vital signs every 8 hours. 9.  Extensive patient education was done by bedside. 10.  DVT prophylaxis, Lovenox. 11.  Monitor vital signs including blood pressure as per unit protocol. 12.  Restart appropriate home medications. 15.  Fall precautions, skin care precaution, bedrest.  14.  I discussed advance directive with the patient. The spouse is the next of kin. Code is full code. 15.  Further management will depend on the patient's clinical progression. Further evaluation by attending physician, result of test.    16.  Please consider Cardiology and or Neurology consult as per result of tests.         Gurwinder Gillis MD      [email or web address]  D:  08/31/2019 23:40  T:  08/31/2019 23:51  JOB #:  [id number]  CC:

## 2019-09-01 NOTE — PROGRESS NOTES
Problem: Falls - Risk of  Goal: *Absence of Falls  Description  Document Ashvin Fells Fall Risk and appropriate interventions in the flowsheet. Outcome: Progressing Towards Goal  Note:   Fall Risk Interventions:                      History of Falls Interventions:  Investigate reason for fall         Problem: Patient Education: Go to Patient Education Activity  Goal: Patient/Family Education  Outcome: Progressing Towards Goal     Problem: Syncope  Goal: *Absence of injury  Outcome: Progressing Towards Goal  Goal: Decrease or eliminate episodes of syncope  Outcome: Progressing Towards Goal     Problem: Patient Education: Go to Patient Education Activity  Goal: Patient/Family Education  Outcome: Progressing Towards Goal

## 2019-09-03 ENCOUNTER — OFFICE VISIT (OUTPATIENT)
Dept: FAMILY MEDICINE CLINIC | Age: 69
End: 2019-09-03

## 2019-09-03 ENCOUNTER — PATIENT OUTREACH (OUTPATIENT)
Dept: FAMILY MEDICINE CLINIC | Age: 69
End: 2019-09-03

## 2019-09-03 VITALS
OXYGEN SATURATION: 98 % | HEIGHT: 73 IN | SYSTOLIC BLOOD PRESSURE: 129 MMHG | RESPIRATION RATE: 18 BRPM | DIASTOLIC BLOOD PRESSURE: 80 MMHG | TEMPERATURE: 98.2 F | BODY MASS INDEX: 25.55 KG/M2 | HEART RATE: 89 BPM | WEIGHT: 192.8 LBS

## 2019-09-03 DIAGNOSIS — R55 SYNCOPE, UNSPECIFIED SYNCOPE TYPE: Primary | ICD-10-CM

## 2019-09-03 DIAGNOSIS — Z23 ENCOUNTER FOR IMMUNIZATION: ICD-10-CM

## 2019-09-03 DIAGNOSIS — Z23 NEED FOR SHINGLES VACCINE: ICD-10-CM

## 2019-09-03 NOTE — PROGRESS NOTES
Chief Complaint   Patient presents with   [facility] Follow Up     was in [facility]  to 2019 for syncope . Had echo        Reviewed Record in preparation for visit and have obtained necessary documentation. Identified pt with two pt identifiers (Name @ )    Health Maintenance Due   Topic    Shingrix Vaccine Age 50> (1 of 2)    Pneumococcal 65+ years (2 of 2 - PCV13)    COLONOSCOPY     Influenza Age 5 to Adult          1. Have you been to the ER, urgent care clinic since your last visit? Hospitalized since your last visit? No      2. Have you seen or consulted any other health care providers outside of the [address] [place] [address] since your last visit? Include any pap smears or colon screening.  no

## 2019-09-03 NOTE — PROGRESS NOTES
[facility] Note    Mr. Christina oSliz is a 78y.o. year old male, he is seen today for Transition of Care services following a hospital discharge for syncope on 9/1/19. Our office Nurse Navigator performed an outreach to Mr. Catia Steele on 9/3/19 (within 2 business days of discharge) to complete medication reconciliation and a telephonic assessment of his condition. Subjective:  Hospital Follow up  Patient seen for follow up syncope. Patient presented to [facility] ED via EMS with chief complaint of syncope. Patient reports that he was standing in the kitchen to get himself a drink when he felt \"flushed\" and passed out. Pt's wife reports that he was unresponsive for a few seconds and was diaphoretic. Pt had a hx of similar in 8/2018. He was admitted for further evaluation and treated for dehydration with IVF. Echocardiogram showed estimated left ventricular ejection fraction is 56 - 60% with Mild (grade 1) left ventricular diastolic dysfunction. Aortic Valve: Mild aortic valve sclerosis. Patient was discharged home with focus on increasing fluids which he has done so. Patient is concerned about driving as he is a . Prior to Admission medications    Medication Sig Start Date End Date Taking? Authorizing Provider   varicella-zoster recombinant, PF, (SHINGRIX, PF,) 50 mcg/0.5 mL susr injection 0.5 mL by IntraMUSCular route once for 1 dose. Give second dose 2 months after first 9/3/19 9/3/19 Yes Mabel Rasheed, HUMA   CHLORPHENIRAMINE/PHENYLEPHRINE (SINUS AND ALLERGY PE PO) Take  by mouth as needed. Yes Provider, Historical   fluticasone (FLONASE) 50 mcg/actuation nasal spray 2 Sprays by Both Nostrils route daily. Patient taking differently: 2 Sprays by Both Nostrils route as needed. 5/3/16  Yes Taina Dowd, NP   tamsulosin (FLOMAX) 0.4 mg capsule Take 1 Cap by mouth daily.  8/6/18   Bernette Oppenheim, MD   aspirin delayed-release 81 mg tablet Take 81 mg by mouth daily. Provider, Historical          No Known Allergies        ROS  See HPI    Objective:   Physical Exam   Constitutional: He is oriented to person, place, and time. He appears well-developed and well-nourished. Neck: Normal range of motion. Neck supple. No JVD present. Carotid bruit is not present. No thyromegaly present. Cardiovascular: Normal rate, regular rhythm and intact distal pulses. Exam reveals no gallop and no friction rub. No murmur heard. Pulmonary/Chest: Effort normal and breath sounds normal. No respiratory distress. Musculoskeletal: He exhibits no edema. Lymphadenopathy:     He has no cervical adenopathy. Neurological: He is alert and oriented to person, place, and time. Psychiatric: He has a normal mood and affect. His behavior is normal.   Nursing note and vitals reviewed. Visit Vitals  /80 (BP 1 Location: Right arm, BP Patient Position: Sitting)   Pulse 89   Temp 98.2 °F (36.8 °C) (Oral)   Resp 18   Ht 6' 1\" (1.854 m)   Wt 192 lb 12.8 oz (87.5 kg)   SpO2 98%   BMI 25.44 kg/m²       Assessment & Plan:  Diagnoses and all orders for this visit:    1. Syncope, unspecified syncope type  Consider vasovagal syncope vs arrhythmia. Request cardiology evaluation given recurrence. Referral to neurology for if negative work up. Maintain adequate hydration.   -     REFERRAL TO CARDIOLOGY    2. Encounter for immunization  -     INFLUENZA VACCINE INACTIVATED (IIV), SUBUNIT, ADJUVANTED, IM    3. Need for shingles vaccine  -     varicella-zoster recombinant, PF, (SHINGRIX, PF,) 50 mcg/0.5 mL susr injection; 0.5 mL by IntraMUSCular route once for 1 dose. Give second dose 2 months after first      I have discussed the diagnosis with the patient and the intended plan as seen in the above orders. The patient has received an after-visit summary along with patient information handout.   I have discussed medication side effects and warnings with the patient as well.      Follow-up and Dispositions    · Return in about 6 months (around 3/3/2020) for disease management.            Michelle Naranjo NP

## 2019-09-03 NOTE — PATIENT INSTRUCTIONS
Vasovagal Syncope: Care Instructions  Your Care Instructions    Vasovagal syncope (say \"aaw-bai-UAIBharti LAL-kuh-pee\")is sudden dizziness or fainting that can be set off by things such as pain, stress, fear, or trauma. You may sweat or feel lightheaded, sick to your stomach, or tingly. The problem causes the heart rate to slow and the blood vessels to widen, or dilate, for a short time. When this happens, blood pools in the lower body, and less blood goes to the brain. You can usually get relief by lying down with your legs raised (elevated). This helps more blood to flow to your brain and may help relieve symptoms like feeling dizzy. Some doctors may recommend a technique that involves tensing your fists and arms. This type of fainting is often easy to predict. For example, it happens to some people when they see blood or have to get a shot. They may feel symptoms before they faint. An episode of vasovagal syncope usually responds well to self-care. Other treatment often isn't needed. But if the fainting keeps happening, your doctor may suggest further treatments. Follow-up care is a key part of your treatment and safety. Be sure to make and go to all appointments, and call your doctor if you are having problems. It's also a good idea to know your test results and keep a list of the medicines you take. How can you care for yourself at home? · Drink plenty of fluids to prevent dehydration. If you have kidney, heart, or liver disease and have to limit fluids, talk with your doctor before you increase your fluid intake. · Try to avoid things that you think may set off vasovagal syncope. · Talk to your doctor about any medicines you take. Some medicines may increase the chance of this condition occurring. · If you feel symptoms, lie down with your legs raised. Talk to your doctor about what to do if your symptoms come back. When should you call for help?   Call 911 anytime you think you may need emergency care. For example, call if:    · You have symptoms of a heart problem. These may include:  ? Chest pain or pressure. ? Severe trouble breathing. ? A fast or irregular heartbeat.    Watch closely for changes in your health, and be sure to contact your doctor if:    · You have more episodes of fainting at home.     · You do not get better as expected. Where can you learn more? Go to http://allie-natalya.info/. Enter L754 in the search box to learn more about \"Vasovagal Syncope: Care Instructions. \"  Current as of: September 23, 2018  Content Version: 12.1  © 3560-2352 [employer]. Care instructions adapted under license by [employer] (which disclaims liability or warranty for this information). If you have questions about a medical condition or this instruction, always ask your healthcare professional. [address] any warranty or liability for your use of this information.

## 2019-09-03 NOTE — PROGRESS NOTES
.  Hospital Discharge Follow-Up      Date/Time:  9/3/2019 2:39 PM    Patient was admitted to [facility] on 19 and discharged on 19 for Syncope. The physician discharge summary was available at the time of outreach. Patient was contacted within 1 business days of discharge. Top Challenges reviewed with the provider   [facility] admit -19 for syncope  · Suspect syncope was due to dehydration  · EF=56-60%, has mild left ventricular diastole dysfunction and mild aortic valve sclerosis, and mild tricuspid valve regurgitation. .  · No ACP on chart  · Health Maintenance due  · Consider neurology/Cardiology referral  Labs=unremarkable     Method of communication with provider :staff message, phone    Inpatient RRAT score: 5  Was this a readmission? no   Patient stated reason for the readmission:     Nurse Navigator (NN) contacted the patient by telephone to perform post hospital discharge assessment. Verified name and  with patient as identifiers. Provided introduction to self, and explanation of the Nurse Navigator role. Reviewed discharge instructions and red flags with patient who verbalized understanding. Patient given an opportunity to ask questions and does not have any further questions or concerns at this time. The patient agrees to contact the PCP office for questions related to their healthcare. NN provided contact information for future reference. Patient admits to a scheduled  Follow-up appointment with Cardiology for 19. Disease Specific:   N/A    Summary of patient's top problems:  1. Syncope- complained of becoming flushed and passing out when he was getting something to drink. Similar occurrence 2018. Home Health orders at discharge: [address]:   Date of initial visit:     [address] ordered/company:   Durable Medical Equipment received:     Barriers to care?  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 Medication(s):   New Medications at Discharge:    Changed Medications at Discharge:   Discontinued Medications at Discharge: Prevnar-13    Medication reconciliation was performed with patient, who verbalizes understanding of administration of home medications. There were no barriers to obtaining medications identified at this time. Referral to Pharm D needed: no     Current Outpatient Medications   Medication Sig    varicella-zoster recombinant, PF, (SHINGRIX, PF,) 50 mcg/0.5 mL susr injection 0.5 mL by IntraMUSCular route once for 1 dose. Give second dose 2 months after first    tamsulosin (FLOMAX) 0.4 mg capsule Take 1 Cap by mouth daily.  CHLORPHENIRAMINE/PHENYLEPHRINE (SINUS AND ALLERGY PE PO) Take  by mouth as needed.  fluticasone (FLONASE) 50 mcg/actuation nasal spray 2 Sprays by Both Nostrils route daily. (Patient taking differently: 2 Sprays by Both Nostrils route as needed.)    aspirin delayed-release 81 mg tablet Take 81 mg by mouth daily. No current facility-administered medications for this visit. There are no discontinued medications. [facility] follow up appointment(s):   Future Appointments   Date Time Provider Sourav Carrero   9/5/2019 11:00 AM Dionicio Dennis  [address]      Non-[facility] follow up appointment(s):   [facility]:  offered and patient declined       Goals      Returns to baseline activity level. [facility] [place] admit 8/31-9/1/19 for syncope possible due to dehydrarion  · Reviewed Red Flags:  · If you have symptoms of a heart attack. These may include:  ? Chest pain or pressure, or a strange feeling in the chest.  ? Sweating. ? Shortness of breath. ? Nausea or vomiting.   ? Pain, pressure, or a strange feeling in the back, neck, jaw, or upper belly or in one or both shoulders or arms.  ? Lightheadedness or sudden weakness. ? A fast or irregular heartbeat. After you call 911, the  may tell you to chew 1 adult-strength or 2 to 4 low-dose aspirin. Wait for an ambulance. Do not try to drive yourself. ·    Watch closely for changes in your health, and be sure to contact your provider  if  · :You have symptoms of a stroke. These may include:  ? Sudden numbness, tingling, weakness, or loss of movement in your face, arm, or leg, especially on only one side of your body. ? Sudden vision changes. ? Sudden trouble speaking. ? Sudden confusion or trouble understanding simple statements. ? Sudden problems with walking or balance. · A sudden, severe headache that is different from past headaches. Activity     · Avoid movements, positions, or activities that have made you lightheaded in the past.  · Get plenty of rest, especially if you have a cold or flu, which can cause lightheadedness. · Make sure you drink plenty of fluids, especially if you have a fever or have been sweating. · Do not drive or put yourself and others in danger while you feel lightheaded  · Keep all follow up appointments as scheduled  · Admit to attending PP follow up on 9/3/19  · Admits to willingness to attend Cardiology appointment on 9/5/19 with Dr. Raman Lawson    NN will follow in 7-10 days.

## 2019-09-05 ENCOUNTER — OFFICE VISIT (OUTPATIENT)
Dept: CARDIOLOGY CLINIC | Age: 69
End: 2019-09-05

## 2019-09-05 VITALS
HEART RATE: 88 BPM | OXYGEN SATURATION: 98 % | RESPIRATION RATE: 18 BRPM | SYSTOLIC BLOOD PRESSURE: 110 MMHG | WEIGHT: 193 LBS | DIASTOLIC BLOOD PRESSURE: 70 MMHG | BODY MASS INDEX: 25.58 KG/M2 | HEIGHT: 73 IN

## 2019-09-05 DIAGNOSIS — R55 VASOVAGAL SYNCOPE: Primary | ICD-10-CM

## 2019-09-05 RX ORDER — BISMUTH SUBSALICYLATE 262 MG
1 TABLET,CHEWABLE ORAL DAILY
COMMUNITY
End: 2021-07-13

## 2019-09-05 NOTE — PROGRESS NOTES
HISTORY OF PRESENT ILLNESS  Josh Gutierrez is a 78 y.o. male     SUMMARY:   Problem List  Date Reviewed: 9/4/2019          Codes Class Noted    Syncope ICD-10-CM: R55  ICD-9-CM: 780.2  8/31/2019        Right inguinal hernia ICD-10-CM: K40.90  ICD-9-CM: 550.90  5/3/2016        ACP (advance care planning) ICD-10-CM: Z71.89  ICD-9-CM: V65.49  5/3/2016        AR (allergic rhinitis) ICD-10-CM: J30.9  ICD-9-CM: 477.9  7/2/2010        Anxiety disorder ICD-10-CM: F41.9  ICD-9-CM: 300.00  7/2/2010              Current Outpatient Medications on File Prior to Visit   Medication Sig    multivitamin (ONE A DAY) tablet Take 1 Tab by mouth daily.  CHLORPHENIRAMINE/PHENYLEPHRINE (SINUS AND ALLERGY PE PO) Take  by mouth as needed. No current facility-administered medications on file prior to visit. CARDIOLOGY STUDIES TO DATE:  9/19 echo normal lvef, sclerotic non stenotic av, mild tr    Chief Complaint   Patient presents with    New Patient     HPI :  Mr. Claudy Gentile is a 78year-old referred by José Babcock for evaluation of recurrent syncope. He has had two syncopal episodes both in the month of August or early September this year and last year. He felt it coming on where he felt lightheaded, dizzy and got gray vision and was standing at the time and then slumped down and was briefly unresponsive. In both cases, including the most recent one, it appeared to be related to some mild dehydration. 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 He drinks coffee and large cans of [place] [employer] all day long and very little water. There is no history of hypertension or diabetes. Cholesterol has been good. He quit smoking in 2013. Family history is negative for premature coronary disease. There is a question of untreated sleep apnea. Following the second episode, he was hospitalized at [facility] for a day or so.   His EKG, lab work and echocardiogram were all unremarkable and I have reviewed those and summarized them in the appropriate places in his chart. He has occasional episodes where he needs to get up to urinate at night. He has some back and hip discomfort. CARDIAC ROS:   negative for chest pain, dyspnea, palpitations, orthopnea, paroxysmal nocturnal dyspnea, exertional chest pressure/discomfort, claudication, lower extremity edema    Family History   Problem Relation Age of Onset    Heart Disease Mother         CAD s/p CABG 66's    Cancer Father     Alcohol abuse Father        Past Medical History:   Diagnosis Date    Allergic rhinitis, cause unspecified     Anxiety state, unspecified     Degenerative arthritis of lumbar spine 9/12    Eustachian tube dysfunction        GENERAL ROS:  A comprehensive review of systems was negative except for that written in the HPI. Visit Vitals  /70 (BP 1 Location: Left arm, BP Patient Position: Sitting)   Pulse 88   Resp 18   Ht 6' 1\" (1.854 m)   Wt 193 lb (87.5 kg)   SpO2 98%   BMI 25.46 kg/m²       Wt Readings from Last 3 Encounters:   09/05/19 193 lb (87.5 kg)   09/03/19 192 lb 12.8 oz (87.5 kg)   09/01/19 190 lb 11.2 oz (86.5 kg)            BP Readings from Last 3 Encounters:   09/05/19 110/70   09/03/19 129/80   09/01/19 133/85       PHYSICAL EXAM  General appearance: alert, cooperative, no distress, appears stated age  Neurologic: Alert and oriented X 3  Neck: supple, symmetrical, trachea midline, no adenopathy, no carotid bruit and no JVD  Lungs: clear to auscultation bilaterally  Heart: regular rate and rhythm, S1, S2 normal, no murmur, click, rub or gallop  Abdomen: soft, non-tender.  Bowel sounds normal. No masses,  no organomegaly  Extremities: extremities normal, atraumatic, no cyanosis or edema  Pulses: 2+ and symmetric    Lab Results   Component Value Date/Time    Cholesterol, total 161 10/17/2018 12:17 PM    Cholesterol, total 176 09/26/2017 12:00 PM    Cholesterol, total 150 05/03/2016 09:40 AM    Cholesterol, total 175 10/03/2014 11:20 AM    Cholesterol, total 120 03/26/2012 09:50 AM    HDL Cholesterol 56 10/17/2018 12:17 PM    HDL Cholesterol 60 09/26/2017 12:00 PM    HDL Cholesterol 47 05/03/2016 09:40 AM    HDL Cholesterol 60 10/03/2014 11:20 AM    HDL Cholesterol 45 03/26/2012 09:50 AM    LDL, calculated 86 10/17/2018 12:17 PM    LDL, calculated 97 09/26/2017 12:00 PM    LDL, calculated 86 05/03/2016 09:40 AM    LDL, calculated 97 10/03/2014 11:20 AM    LDL, calculated 61 03/26/2012 09:50 AM    Triglyceride 96 10/17/2018 12:17 PM    Triglyceride 94 09/26/2017 12:00 PM    Triglyceride 87 05/03/2016 09:40 AM    Triglyceride 88 10/03/2014 11:20 AM    Triglyceride 71 03/26/2012 09:50 AM     ASSESSMENT  Mr. Brad Dewitt has probably some sort of vasovagal syncope episodes, perhaps just simple orthostatic hypotension that was exacerbated by his lack of regular exercise, stress and excess amounts of caffeine and too little water. We talked about all of this in great length and he has already made some changes. At this point, he needs no further cardiac testing. current treatment plan is effective, no change in therapy  lab results and schedule of future lab studies reviewed with patient  reviewed diet, exercise and weight control    Encounter Diagnoses   Name Primary?  Vasovagal syncope Yes     Orders Placed This Encounter    multivitamin (ONE A DAY) tablet       Follow-up and Dispositions    · Return in about 4 weeks (around 10/3/2019).          Justyn Lynn MD  9/5/2019

## 2019-09-20 ENCOUNTER — PATIENT OUTREACH (OUTPATIENT)
Dept: FAMILY MEDICINE CLINIC | Age: 69
End: 2019-09-20

## 2019-09-20 NOTE — PROGRESS NOTES
B[facility]EN Follow-up assessment: [facility] admit 8/31-9/1/19 for syncopeOutbound call made to patient today to complete BOWEN follow up assessment. Patient verified by 3 identifiers. According to spouse, the patient has not had any more episodes[place] Follow-up appointments reviewed, and medication reconciliation completed. NN contact information given for questions and concerns. Current Outpatient Medications:     multivitamin (ONE A DAY) tablet, Take 1 Tab by mouth daily. , Disp: , Rfl:     CHLORPHENIRAMINE/PHENYLEPHRINE (SINUS AND ALLERGY PE PO), Take  by mouth as needed. , Disp: , Rfl:      Goals Addressed                 This Visit's Progress     Returns to baseline activity level. [facility] admit 8/31-9/1/19 for syncope possible due to dehydrarion  · Reviewed Red Flags:  · If you have symptoms of a heart attack. These may include:  ? Chest pain or pressure, or a strange feeling in the chest.  ? Sweating. ? Shortness of breath. ? Nausea or vomiting. ? Pain, pressure, or a strange feeling in the back, neck, jaw, or upper belly or in one or both shoulders or arms. ? Lightheadedness or sudden weakness. ? A fast or irregular heartbeat. After you call 911, the  may tell you to chew 1 adult-strength or 2 to 4 low-dose aspirin. Wait for an ambulance. Do not try to drive yourself. ·    Watch closely for changes in your health, and be sure to contact your provider  if  · :You have symptoms of a stroke. These may include:  ? Sudden numbness, tingling, weakness, or loss of movement in your face, arm, or leg, especially on only one side of your body. ? Sudden vision changes. ? Sudden trouble speaking. ? Sudden confusion or trouble understanding simple statements. ? Sudden problems with walking or balance. · A sudden, severe headache that is different from past headaches.    Activity     · Avoid movements, positions, or activities that have made you lightheaded in the past.  · Get plenty of rest, especially if you have a cold or flu, which can cause lightheadedness. · Make sure you drink plenty of fluids, especially if you have a fever or have been sweating. · Do not drive or put yourself and others in danger while you feel lightheaded  · Keep all follow up appointments as scheduled  · Admit to attending PP follow up on 9/3/19  · Admits to willingness to attend Cardiology appointment on 9/5/19 with Dr. Zackary Guerrero    NN will follow in 7-10 days. 09/20/19  · Ppoke with spouse, who stated patient is doing much better  · No further episodes of syncope  · At the [facility] in [place]  · Had Cardiology follow up- everything good  · Admits to staying well hydrated and monitoring BP  · Has follow up with cardiology on 10/3/19  · NN will follow in 7-10 days. pk          Case management Plan:  NN will continue to attempt contacts with patient by telephone or during office visit within next 7-10 days.  Will continue to follow as necessary for the remaining 30 days post visit and will reassess for needs before discharge

## 2019-10-03 ENCOUNTER — OFFICE VISIT (OUTPATIENT)
Dept: CARDIOLOGY CLINIC | Age: 69
End: 2019-10-03

## 2019-10-03 VITALS
SYSTOLIC BLOOD PRESSURE: 122 MMHG | HEART RATE: 87 BPM | HEIGHT: 73 IN | DIASTOLIC BLOOD PRESSURE: 78 MMHG | BODY MASS INDEX: 25.47 KG/M2 | WEIGHT: 192.2 LBS | OXYGEN SATURATION: 97 % | RESPIRATION RATE: 16 BRPM

## 2019-10-03 DIAGNOSIS — R55 VASOVAGAL SYNCOPE: Primary | ICD-10-CM

## 2019-10-03 NOTE — PROGRESS NOTES
HISTORY OF PRESENT ILLNESS  Oliver Degroot is a 78 y.o. male     SUMMARY:   Problem List  Date Reviewed: 10/2/2019          Codes Class Noted    Syncope ICD-10-CM: R55  ICD-9-CM: 780.2  8/31/2019        Right inguinal hernia ICD-10-CM: K40.90  ICD-9-CM: 550.90  5/3/2016        ACP (advance care planning) ICD-10-CM: Z71.89  ICD-9-CM: V65.49  5/3/2016        AR (allergic rhinitis) ICD-10-CM: J30.9  ICD-9-CM: 477.9  7/2/2010        Anxiety disorder ICD-10-CM: F41.9  ICD-9-CM: 300.00  7/2/2010              Current Outpatient Medications on File Prior to Visit   Medication Sig    multivitamin (ONE A DAY) tablet Take 1 Tab by mouth daily.  CHLORPHENIRAMINE/PHENYLEPHRINE (SINUS AND ALLERGY PE PO) Take  by mouth as needed. No current facility-administered medications on file prior to visit. CARDIOLOGY STUDIES TO DATE:  9/19 echo normal lvef, sclerotic non stenotic av, mild tr    Chief Complaint   Patient presents with    Follow-up     HPI :  Mr. Gertrudis Otoole is doing great. He has had no further spells. He has pretty much given up caffeine and he is starting light exercise and wants to do more. CARDIAC ROS:   negative for chest pain, dyspnea, palpitations, syncope, orthopnea, paroxysmal nocturnal dyspnea, exertional chest pressure/discomfort, claudication, lower extremity edema    Family History   Problem Relation Age of Onset    Heart Disease Mother         CAD s/p CABG 66's    Cancer Father     Alcohol abuse Father        Past Medical History:   Diagnosis Date    Allergic rhinitis, cause unspecified     Anxiety state, unspecified     Degenerative arthritis of lumbar spine 9/12    Eustachian tube dysfunction        GENERAL ROS:  A comprehensive review of systems was negative except for that written in the HPI.     Visit Vitals  /78 (BP 1 Location: Left arm, BP Patient Position: Sitting)   Pulse 87   Resp 16   Ht 6' 1\" (1.854 m)   Wt 192 lb 3.2 oz (87.2 kg)   SpO2 97%   BMI 25.36 kg/m²       Wt Readings from Last 3 Encounters:   10/03/19 192 lb 3.2 oz (87.2 kg)   09/05/19 193 lb (87.5 kg)   09/03/19 192 lb 12.8 oz (87.5 kg)            BP Readings from Last 3 Encounters:   10/03/19 122/78   09/05/19 110/70   09/03/19 129/80       PHYSICAL EXAM  General appearance: alert, cooperative, no distress, appears stated age  Neurologic: Alert and oriented X 3  Neck: supple, symmetrical, trachea midline, no adenopathy, no carotid bruit and no JVD  Lungs: clear to auscultation bilaterally  Heart: regular rate and rhythm, S1, S2 normal, no murmur, click, rub or gallop  Extremities: extremities normal, atraumatic, no cyanosis or edema    Lab Results   Component Value Date/Time    Cholesterol, total 161 10/17/2018 12:17 PM    Cholesterol, total 176 09/26/2017 12:00 PM    Cholesterol, total 150 05/03/2016 09:40 AM    Cholesterol, total 175 10/03/2014 11:20 AM    Cholesterol, total 120 03/26/2012 09:50 AM    HDL Cholesterol 56 10/17/2018 12:17 PM    HDL Cholesterol 60 09/26/2017 12:00 PM    HDL Cholesterol 47 05/03/2016 09:40 AM    HDL Cholesterol 60 10/03/2014 11:20 AM    HDL Cholesterol 45 03/26/2012 09:50 AM    LDL, calculated 86 10/17/2018 12:17 PM    LDL, calculated 97 09/26/2017 12:00 PM    LDL, calculated 86 05/03/2016 09:40 AM    LDL, calculated 97 10/03/2014 11:20 AM    LDL, calculated 61 03/26/2012 09:50 AM    Triglyceride 96 10/17/2018 12:17 PM    Triglyceride 94 09/26/2017 12:00 PM    Triglyceride 87 05/03/2016 09:40 AM    Triglyceride 88 10/03/2014 11:20 AM    Triglyceride 71 03/26/2012 09:50 AM     ASSESSMENT  Mr. Hung Casillas is stable and asymptomatic, well compensated on a good medical regimen and needs no cardiac testing at this time. current treatment plan is effective, no change in therapy  lab results and schedule of future lab studies reviewed with patient  reviewed diet, exercise and weight control    Encounter Diagnoses   Name Primary?     Vasovagal syncope Yes     No orders of the defined types were placed in this encounter. Follow-up and Dispositions    · Return if symptoms worsen or fail to improve.          Hamlet Meza MD  10/3/2019

## 2019-10-18 ENCOUNTER — PATIENT OUTREACH (OUTPATIENT)
Dept: FAMILY MEDICINE CLINIC | Age: 69
End: 2019-10-18

## 2019-10-18 NOTE — PROGRESS NOTES
.  Patient has graduated from the Transitions of Care Coordination  program on 10/18/19. Patient's symptoms are stable at this time. Patient/family has the ability to self-manage. Care management goals have been completed at this time. No further nurse navigator follow up scheduled. Goals Addressed                 This Visit's Progress     COMPLETED: Returns to baseline activity level. [facility] admit 8/31-9/1/19 for syncope possible due to dehydrarion  · Reviewed Red Flags:  · If you have symptoms of a heart attack. These may include:  ? Chest pain or pressure, or a strange feeling in the chest.  ? Sweating. ? Shortness of breath. ? Nausea or vomiting. ? Pain, pressure, or a strange feeling in the back, neck, jaw, or upper belly or in one or both shoulders or arms. ? Lightheadedness or sudden weakness. ? A fast or irregular heartbeat. After you call 911, the  may tell you to chew 1 adult-strength or 2 to 4 low-dose aspirin. Wait for an ambulance. Do not try to drive yourself. ·    Watch closely for changes in your health, and be sure to contact your provider  if  · :You have symptoms of a stroke. These may include:  ? Sudden numbness, tingling, weakness, or loss of movement in your face, arm, or leg, especially on only one side of your body. ? Sudden vision changes. ? Sudden trouble speaking. ? Sudden confusion or trouble understanding simple statements. ? Sudden problems with walking or balance. · A sudden, severe headache that is different from past headaches. Activity     · Avoid movements, positions, or activities that have made you lightheaded in the past.  · Get plenty of rest, especially if you have a cold or flu, which can cause lightheadedness. · Make sure you drink plenty of fluids, especially if you have a fever or have been sweating.   · Do not drive or put yourself and others in danger while you feel lightheaded  · Keep all follow up appointments as scheduled  · Admit to attending PP follow up on 9/3/19  · Admits to willingness to attend Cardiology appointment on 9/5/19 with Dr. Roni Key    NN will follow in 7-10 days. 09/20/19  · Ppoke with spouse, who stated patient is doing much better  · No further episodes of syncope  · [facility] in [place]  · Had Cardiology follow up- everything good  · Admits to staying well hydrated and monitoring BP  · Has follow up with cardiology on 10/3/19  · NN will follow in 7-10 days. pk            Pt has nurse navigator's contact information for any further questions, concerns, or needs. Patients upcoming visits:  No future appointments.

## 2019-10-30 ENCOUNTER — OFFICE VISIT (OUTPATIENT)
Dept: FAMILY MEDICINE CLINIC | Age: 69
End: 2019-10-30

## 2019-10-30 ENCOUNTER — HOSPITAL ENCOUNTER (OUTPATIENT)
Dept: LAB | Age: 69
Discharge: HOME | End: 2019-10-30
Payer: MEDICARE

## 2019-10-30 VITALS
SYSTOLIC BLOOD PRESSURE: 112 MMHG | DIASTOLIC BLOOD PRESSURE: 75 MMHG | HEART RATE: 80 BPM | RESPIRATION RATE: 16 BRPM | TEMPERATURE: 97.8 F | BODY MASS INDEX: 25.66 KG/M2 | WEIGHT: 193.6 LBS | OXYGEN SATURATION: 98 % | HEIGHT: 73 IN

## 2019-10-30 DIAGNOSIS — Z13.29 SCREENING FOR ENDOCRINE, METABOLIC AND IMMUNITY DISORDER: ICD-10-CM

## 2019-10-30 DIAGNOSIS — N40.0 BENIGN PROSTATIC HYPERPLASIA WITHOUT LOWER URINARY TRACT SYMPTOMS: Primary | ICD-10-CM

## 2019-10-30 DIAGNOSIS — Z71.89 ACP (ADVANCE CARE PLANNING): ICD-10-CM

## 2019-10-30 DIAGNOSIS — J06.9 VIRAL UPPER RESPIRATORY TRACT INFECTION: ICD-10-CM

## 2019-10-30 DIAGNOSIS — Z12.11 SCREEN FOR COLON CANCER: ICD-10-CM

## 2019-10-30 DIAGNOSIS — Z13.228 SCREENING FOR ENDOCRINE, METABOLIC AND IMMUNITY DISORDER: ICD-10-CM

## 2019-10-30 DIAGNOSIS — Z13.0 SCREENING FOR ENDOCRINE, METABOLIC AND IMMUNITY DISORDER: ICD-10-CM

## 2019-10-30 DIAGNOSIS — Z00.00 MEDICARE ANNUAL WELLNESS VISIT, SUBSEQUENT: ICD-10-CM

## 2019-10-30 PROCEDURE — 80061 LIPID PANEL: CPT

## 2019-10-30 PROCEDURE — 84443 ASSAY THYROID STIM HORMONE: CPT

## 2019-10-30 PROCEDURE — 36415 COLL VENOUS BLD VENIPUNCTURE: CPT

## 2019-10-30 PROCEDURE — 80053 COMPREHEN METABOLIC PANEL: CPT

## 2019-10-30 PROCEDURE — 84153 ASSAY OF PSA TOTAL: CPT

## 2019-10-30 RX ORDER — AZITHROMYCIN 250 MG/1
TABLET, FILM COATED ORAL
Qty: 6 TAB | Refills: 0 | Status: SHIPPED | OUTPATIENT
Start: 2019-10-30 | End: 2019-11-04

## 2019-10-30 NOTE — PATIENT INSTRUCTIONS
Colon Cancer Screening: Care Instructions Your Care Instructions Colorectal cancer occurs in the colon or rectum. That's the lower part of your digestive system. It is the second-leading cause of cancer deaths in the [place]. It often starts with small growths called polyps in the colon or rectum. Polyps are usually found with screening tests. Depending on the type of test, any polyps found may be removed during the tests. Colorectal cancer usually does not cause symptoms at first. But regular tests can help find it early, before it spreads and becomes harder to treat. Your risk for colorectal cancer gets higher as you get older. Some experts say that adults should start regular screening at age 48 and stop at age 76. Others say to start before age 48 or continue after age 76. Talk with your doctor about your risk and when to start and stop screening. You may have one of several tests. Follow-up care is a key part of your treatment and safety. Be sure to make and go to all appointments, and call your doctor if you are having problems. It's also a good idea to know your test results and keep a list of the medicines you take. What are the main screening tests for colon cancer? · Stool tests. These include the fecal immunochemical test (FIT) and the fecal occult blood test (FOBT). These tests check stool samples for signs of cancer. If your test is positive, you will need to have a colonoscopy. · Sigmoidoscopy. This test lets your doctor look at the lining of your rectum and the lowest part of your colon. Your doctor uses a lighted tube called a sigmoidoscope. This test can't find cancers or polyps in the upper part of your colon. In some cases, polyps that are found can be removed. But if your doctor finds polyps, you will need to have a colonoscopy to check the upper part of your colon. · Colonoscopy.  This test lets your doctor look at the lining of your rectum and your entire colon. The doctor uses a thin, flexible tool called a colonoscope. It can also be used to remove polyps or get a tissue sample (biopsy). What tests do you need? The following guidelines are for adults who are not at high risk for colorectal cancer. You may have at least one of these tests as directed by your doctor. · Fecal immunochemical test (FIT) or guaiac fecal occult blood test (gFOBT) every year · Sigmoidoscopy every 5 years · Colonoscopy every 10 years If you are over age 76, you can work with your doctor to decide if screening is a good option. Talk with your doctor about when you need to be tested. And discuss which tests are right for you. Your doctor may recommend earlier or more frequent testing if you: 
· Have had colorectal cancer before. · Have had colon polyps. · Have symptoms of colorectal cancer. These include blood in your stool and changes in your bowel habits. · Have a parent, brother or sister, or child with colon polyps or colorectal cancer. · Have a bowel disease. This includes ulcerative colitis and Crohn's disease. · Have a rare polyp syndrome that runs in families, such as familial adenomatous polyposis (FAP). · Have had radiation treatments to the belly or pelvis. When should you call for help? Watch closely for changes in your health, and be sure to contact your doctor if: 
  · You have any changes in your bowel habits.  
  · You have any problems. Where can you learn more? Go to http://allie-natalya.info/. Enter M541 in the search box to learn more about \"Colon Cancer Screening: Care Instructions. \" Current as of: December 19, 2018 Content Version: 12.2 © 9482-0975 [employer]. Care instructions adapted under license by [employer] (which disclaims liability or warranty for this information).  If you have questions about a medical condition or this instruction, always ask your healthcare professional. [place] [address] any warranty or liability for your use of this information.

## 2019-10-30 NOTE — PROGRESS NOTES
[facility] Note    Abbey Owens is a 78 y.o. male who was seen in clinic today (10/30/2019). Subjective:  Cardiovascular Review:  The patient has no known cardiovascular conditions. Diet and Lifestyle: generally follows a low fat low cholesterol diet, generally follows a low sodium diet, exercises sporadically, smoker cigar once per month  Home BP Monitoring: is not measured at home. Pertinent ROS: taking medications as instructed, no medication side effects noted, no TIA's, no chest pain on exertion, no dyspnea on exertion, no swelling of ankles.      Last colonoscopy in 2007.      BPH   Patient complains of lower urinary tract symptoms. Patient reports mild symptoms of BPH. Onset of symptoms was a few months ago and was gradual in onset. He reports irritative symptoms including frequency. He denies intermittency, weak stream.  Not currently taking Flomax.      Upper Respiratory Infection  Patient complains of symptoms of a URI. Symptoms include congestion, sore throat and cough. Onset of symptoms was 3 days ago, unchanged since that time. He also c/o cough described as productive of green/yellow sputum, nasal congestion, post nasal drip and sore throat for the past 3 days . He is drinking plenty of fluids. . Evaluation to date: none. Treatment to date: OTC products. Prior to Admission medications    Medication Sig Start Date End Date Taking? Authorizing Provider   azithromycin (ZITHROMAX) 250 mg tablet Take 2 tablets today, then take 1 tablet daily 10/30/19 11/4/19 Yes Reinaldo Rasheed NP   multivitamin (ONE A DAY) tablet Take 1 Tab by mouth daily. Yes Provider, Historical   CHLORPHENIRAMINE/PHENYLEPHRINE (SINUS AND ALLERGY PE PO) Take  by mouth as needed. Yes Provider, Historical          No Known Allergies        Review of Systems   Constitutional: Negative for malaise/fatigue and weight loss. HENT: Positive for congestion.     Respiratory: Negative for shortness of breath. Cardiovascular: Negative for chest pain, palpitations and leg swelling. Gastrointestinal: Negative for heartburn. Musculoskeletal: Negative for back pain, joint pain and myalgias. Neurological: Negative for dizziness, weakness and headaches. Psychiatric/Behavioral: Negative for depression. Objective:   Physical Exam   Constitutional: He is oriented to person, place, and time. He appears well-developed and well-nourished. No distress. HENT:   Right Ear: Tympanic membrane and ear canal normal.   Left Ear: Tympanic membrane and ear canal normal.   Nose: Mucosal edema present. Right sinus exhibits no maxillary sinus tenderness and no frontal sinus tenderness. Left sinus exhibits no maxillary sinus tenderness and no frontal sinus tenderness. Mouth/Throat: Oropharynx is clear and moist.   Eyes: Pupils are equal, round, and reactive to light. EOM are normal.   Neck: Normal range of motion. Neck supple. No JVD present. Carotid bruit is not present. No thyromegaly present. Cardiovascular: Normal rate, regular rhythm, normal heart sounds and intact distal pulses. Exam reveals no gallop and no friction rub. No murmur heard. Pulmonary/Chest: Effort normal and breath sounds normal. No respiratory distress. He has no decreased breath sounds. He has no wheezes. He has no rhonchi. Abdominal: Soft. Bowel sounds are normal. He exhibits no distension. There is no tenderness. Musculoskeletal: He exhibits no edema. Lymphadenopathy:     He has no cervical adenopathy. Neurological: He is alert and oriented to person, place, and time. Psychiatric: He has a normal mood and affect. His behavior is normal.   Nursing note and vitals reviewed.         Visit Vitals  /75 (BP 1 Location: Left arm, BP Patient Position: Sitting)   Pulse 80   Temp 97.8 °F (36.6 °C) (Oral)   Resp 16   Ht 6' 1\" (1.854 m)   Wt 193 lb 9.6 oz (87.8 kg)   SpO2 98%   BMI 25.54 kg/m²       Assessment & Plan:  Diagnoses and all orders for this visit:    1. Benign prostatic hyperplasia without lower urinary tract symptoms  -     Check PSA W/ REFLX FREE PSA    2. Screen for colon cancer  Discussed need for colonoscopy as a screening for colon cancer.   -     REFERRAL TO GASTROENTEROLOGY    3. Viral upper respiratory tract infection  Discussed that symptoms were viral and recommended treating symptoms. Increase fluids and rest. Reviewed OTC products that patient could take. -     Delay fill azithromycin (ZITHROMAX) 250 mg tablet; Take 2 tablets today, then take 1 tablet daily    4. Medicare annual wellness visit, subsequent    5. ACP (advance care planning)    6. Screening for endocrine, metabolic and immunity disorder  -     LIPID PANEL  -     TSH AND FREE T4  -     METABOLIC PANEL, COMPREHENSIVE      I have discussed the diagnosis with the patient and the intended plan as seen in the above orders. The patient has received an after-visit summary along with patient information handout. I have discussed medication side effects and warnings with the patient as well. Follow-up and Dispositions    · Return in about 1 year (around 10/30/2020) for Annual Exam - 30 minutes.            Reese Martell NP

## 2019-10-30 NOTE — PROGRESS NOTES
Chief Complaint   Patient presents with    Sore Throat     x 3 days    Cough     productive x 3 days - chest congestion      1. Have you been to the ER, urgent care clinic since your last visit? Hospitalized since your last visit? No    2. Have you seen or consulted any other health care providers outside of the [address] [place] [address] since your last visit? Include any pap smears or colon screening.  No

## 2019-10-31 LAB
ALBUMIN SERPL-MCNC: 4.5 G/DL (ref 3.5–4.8)
ALBUMIN/GLOB SERPL: 2 {RATIO} (ref 1.2–2.2)
ALP SERPL-CCNC: 69 IU/L (ref 39–117)
ALT SERPL-CCNC: 18 IU/L (ref 0–44)
AST SERPL-CCNC: 20 IU/L (ref 0–40)
BILIRUB SERPL-MCNC: 0.5 MG/DL (ref 0–1.2)
BUN SERPL-MCNC: 17 MG/DL (ref 8–27)
BUN/CREAT SERPL: 17 (ref 10–24)
CALCIUM SERPL-MCNC: 9.4 MG/DL (ref 8.6–10.2)
CHLORIDE SERPL-SCNC: 106 MMOL/L (ref 96–106)
CHOLEST SERPL-MCNC: 158 MG/DL (ref 100–199)
CO2 SERPL-SCNC: 25 MMOL/L (ref 20–29)
CREAT SERPL-MCNC: 1.03 MG/DL (ref 0.76–1.27)
GLOBULIN SER CALC-MCNC: 2.3 G/DL (ref 1.5–4.5)
GLUCOSE SERPL-MCNC: 90 MG/DL (ref 65–99)
HDLC SERPL-MCNC: 54 MG/DL
INTERPRETATION, 910389: NORMAL
LDLC SERPL CALC-MCNC: 80 MG/DL (ref 0–99)
POTASSIUM SERPL-SCNC: 4.5 MMOL/L (ref 3.5–5.2)
PROT SERPL-MCNC: 6.8 G/DL (ref 6–8.5)
PSA SERPL-MCNC: 3.8 NG/ML (ref 0–4)
REFLEX CRITERIA: NORMAL
SODIUM SERPL-SCNC: 142 MMOL/L (ref 134–144)
T4 FREE SERPL-MCNC: 1.2 NG/DL (ref 0.82–1.77)
TRIGL SERPL-MCNC: 119 MG/DL (ref 0–149)
TSH SERPL DL<=0.005 MIU/L-ACNC: 1.54 UIU/ML (ref 0.45–4.5)
VLDLC SERPL CALC-MCNC: 24 MG/DL (ref 5–40)

## 2019-10-31 NOTE — PROGRESS NOTES
This is the Subsequent Medicare Annual Wellness Exam, performed 12 months or more after the Initial AWV or the last Subsequent AWV    I have reviewed the patient's medical history in detail and updated the computerized patient record. History     Patient Active Problem List   Diagnosis Code    AR (allergic rhinitis) J30.9    Anxiety disorder F41.9    Right inguinal hernia K40.90    ACP (advance care planning) Z71.89    Syncope R55     Past Medical History:   Diagnosis Date    Allergic rhinitis, cause unspecified     Anxiety state, unspecified     Degenerative arthritis of lumbar spine 9/12    Eustachian tube dysfunction       Past Surgical History:   Procedure Laterality Date    ENDOSCOPY, COLON, DIAGNOSTIC  2007    repeat 2014    HX APPENDECTOMY  1953         HX HERNIA REPAIR  2/85    HX KNEE ARTHROSCOPY  6/02    HX ORTHOPAEDIC  10/75    knee repair     Current Outpatient Medications   Medication Sig Dispense Refill    azithromycin (ZITHROMAX) 250 mg tablet Take 2 tablets today, then take 1 tablet daily 6 Tab 0    multivitamin (ONE A DAY) tablet Take 1 Tab by mouth daily.  CHLORPHENIRAMINE/PHENYLEPHRINE (SINUS AND ALLERGY PE PO) Take  by mouth as needed. No Known Allergies    Family History   Problem Relation Age of Onset    Heart Disease Mother         CAD s/p CABG 66's    Cancer Father     Alcohol abuse Father      Social History     Tobacco Use    Smoking status: Former Smoker     Years: 10.00     Types: Cigars    Smokeless tobacco: Never Used    Tobacco comment: off and on   Substance Use Topics    Alcohol use: Yes       Depression Risk Factor Screening:     3 most recent PHQ Screens 9/3/2019   Little interest or pleasure in doing things Not at all   Feeling down, depressed, irritable, or hopeless Not at all   Total Score PHQ 2 0       Alcohol Risk Factor Screening (MALE > 65):    Do you average more 1 drink per night or more than 7 drinks a week: No    In the past three months have you have had more than 4 drinks containing alcohol on one occasion: No      Functional Ability and Level of Safety:   Hearing: Hearing is good. Activities of Daily Living: The home contains: no safety equipment. Patient does total self care    Ambulation: with no difficulty    Fall Risk:  Fall Risk Assessment, last 12 mths 9/3/2019   Able to walk? Yes   Fall in past 12 months? Yes   Fall with injury? No   Number of falls in past 12 months 1   Fall Risk Score 1       Abuse Screen:  Patient is not abused    Cognitive Screening   Has your family/caregiver stated any concerns about your memory: no      Patient Care Team   Patient Care Team:  Paxton Cunningham, NP as PCP - General (Nurse Practitioner)  Paxton Cunningham NP as PCP - [facility]  Lou Sifuentes MD (Cardiology)    Assessment/Plan   Education and counseling provided:  Are appropriate based on today's review and evaluation  Prostate cancer screening tests (PSA, covered annually)    Diagnoses and all orders for this visit:    1. Benign prostatic hyperplasia without lower urinary tract symptoms  -     PSA W/ REFLX FREE PSA    2. Screen for colon cancer  -     REFERRAL TO GASTROENTEROLOGY    3. Viral upper respiratory tract infection  -     azithromycin (ZITHROMAX) 250 mg tablet; Take 2 tablets today, then take 1 tablet daily    4. Medicare annual wellness visit, subsequent    5. ACP (advance care planning)    6.  Screening for endocrine, metabolic and immunity disorder  -     LIPID PANEL  -     TSH AND FREE T4  -     METABOLIC PANEL, COMPREHENSIVE    Other orders  -     CVD REPORT        Health Maintenance Due   Topic Date Due    Shingrix Vaccine Age 49> (1 of 2) 03/22/1999    Pneumococcal 65+ years (2 of 2 - PCV13) 10/03/2015    COLONOSCOPY  01/01/2017    DTaP/Tdap/Td series (2 - Td) 09/18/2019    MEDICARE YEARLY EXAM  10/18/2019

## 2019-11-01 ENCOUNTER — TELEPHONE (OUTPATIENT)
Dept: FAMILY MEDICINE CLINIC | Age: 69
End: 2019-11-01

## 2019-11-01 NOTE — TELEPHONE ENCOUNTER
Patient is calling requesting to receive another refill on     azithromycin (ZITHROMAX) 250 mg tablet Pt stated that his sinus is still bothering him, pt stated a lot of flem/mucus is still coming up. Pt is requesting for a couple more pills.        .Pharmacy on file verified      Best callback:  [phone]      LOV: Wednesday, October 30, 2019

## 2019-11-01 NOTE — TELEPHONE ENCOUNTER
[phone] notified patient of HUMA Rasheed recommendation and understand    Antibiotic was filled on 10/30/19. Antibiotic stays in body for two weeks after completion.

## 2020-05-26 LAB — SARS-COV-2, NAA: NOT DETECTED

## 2020-08-18 ENCOUNTER — HOSPITAL ENCOUNTER (OUTPATIENT)
Dept: LAB | Age: 70
Discharge: HOME | End: 2020-08-18
Payer: MEDICARE

## 2020-08-18 ENCOUNTER — OFFICE VISIT (OUTPATIENT)
Dept: FAMILY MEDICINE CLINIC | Age: 70
End: 2020-08-18
Payer: MEDICARE

## 2020-08-18 VITALS
HEART RATE: 91 BPM | BODY MASS INDEX: 24.18 KG/M2 | SYSTOLIC BLOOD PRESSURE: 114 MMHG | DIASTOLIC BLOOD PRESSURE: 74 MMHG | RESPIRATION RATE: 16 BRPM | TEMPERATURE: 98.8 F | WEIGHT: 182.4 LBS | HEIGHT: 73 IN | OXYGEN SATURATION: 95 %

## 2020-08-18 DIAGNOSIS — Z13.0 SCREENING FOR ENDOCRINE, METABOLIC AND IMMUNITY DISORDER: ICD-10-CM

## 2020-08-18 DIAGNOSIS — Z13.228 SCREENING FOR ENDOCRINE, METABOLIC AND IMMUNITY DISORDER: ICD-10-CM

## 2020-08-18 DIAGNOSIS — N40.0 BENIGN PROSTATIC HYPERPLASIA WITHOUT LOWER URINARY TRACT SYMPTOMS: Primary | ICD-10-CM

## 2020-08-18 DIAGNOSIS — Z23 ENCOUNTER FOR IMMUNIZATION: ICD-10-CM

## 2020-08-18 DIAGNOSIS — Z13.29 SCREENING FOR ENDOCRINE, METABOLIC AND IMMUNITY DISORDER: ICD-10-CM

## 2020-08-18 PROCEDURE — 84153 ASSAY OF PSA TOTAL: CPT

## 2020-08-18 PROCEDURE — 90750 HZV VACC RECOMBINANT IM: CPT

## 2020-08-18 PROCEDURE — G8420 CALC BMI NORM PARAMETERS: HCPCS | Performed by: NURSE PRACTITIONER

## 2020-08-18 PROCEDURE — 85025 COMPLETE CBC W/AUTO DIFF WBC: CPT

## 2020-08-18 PROCEDURE — 90471 IMMUNIZATION ADMIN: CPT | Performed by: NURSE PRACTITIONER

## 2020-08-18 PROCEDURE — 36415 COLL VENOUS BLD VENIPUNCTURE: CPT

## 2020-08-18 PROCEDURE — 3017F COLORECTAL CA SCREEN DOC REV: CPT | Performed by: NURSE PRACTITIONER

## 2020-08-18 PROCEDURE — 80053 COMPREHEN METABOLIC PANEL: CPT

## 2020-08-18 PROCEDURE — 99214 OFFICE O/P EST MOD 30 MIN: CPT | Performed by: NURSE PRACTITIONER

## 2020-08-18 PROCEDURE — 80061 LIPID PANEL: CPT

## 2020-08-18 PROCEDURE — G8432 DEP SCR NOT DOC, RNG: HCPCS | Performed by: NURSE PRACTITIONER

## 2020-08-18 PROCEDURE — G0463 HOSPITAL OUTPT CLINIC VISIT: HCPCS | Performed by: NURSE PRACTITIONER

## 2020-08-18 PROCEDURE — G8427 DOCREV CUR MEDS BY ELIG CLIN: HCPCS | Performed by: NURSE PRACTITIONER

## 2020-08-18 PROCEDURE — G8536 NO DOC ELDER MAL SCRN: HCPCS | Performed by: NURSE PRACTITIONER

## 2020-08-18 PROCEDURE — 1101F PT FALLS ASSESS-DOCD LE1/YR: CPT | Performed by: NURSE PRACTITIONER

## 2020-08-18 RX ORDER — CHOLECALCIFEROL (VITAMIN D3) 125 MCG
CAPSULE ORAL
COMMUNITY
End: 2021-07-13

## 2020-08-18 RX ORDER — POTASSIUM GLUCONATE 595(99)MG
TABLET, EXTENDED RELEASE ORAL
COMMUNITY
End: 2021-04-21 | Stop reason: SDUPTHER

## 2020-08-18 NOTE — PATIENT INSTRUCTIONS
Benign Prostatic Hyperplasia: Care Instructions  Your Care Instructions     Benign prostatic hyperplasia, or BPH, is an enlarged prostate gland. The prostate is a small gland that makes some of the fluid in semen. Prostate enlargement happens to almost all men as they age. It is usually not serious. BPH does not cause prostate cancer. As the prostate gets bigger, it may partly block the flow of urine. You may have a hard time getting a urine stream started or completely stopped. You may have a weak urine stream, or you may have to urinate more often than you used to, especially at night. Most men find these problems easy to manage. You do not need treatment unless your symptoms bother you a lot or you have other problems, such as bladder infections or stones. In these cases, medicines may help. Surgery is not needed unless the urine flow is blocked or the symptoms do not get better with medicine. Follow-up care is a key part of your treatment and safety. Be sure to make and go to all appointments, and call your doctor if you are having problems. It's also a good idea to know your test results and keep a list of the medicines you take. How can you care for yourself at home? · Urinate as much as you can, relax for a few moments, and then try to urinate again. · Sit on the toilet to urinate. · Avoid caffeine and alcohol. These drinks will increase how often you need to urinate. · Many over-the-counter cold and allergy medicines can make the symptoms of BPH worse. Avoid antihistamines, decongestants, and allergy pills, if you can. Read the warnings on the package. · If you take any prescription medicines such as muscle relaxants, pain medicines, or medicines for depression or anxiety, ask your doctor or pharmacist if they can cause urination problems. When should you call for help? Call your doctor now or seek immediate medical care if:  · You cannot urinate at all.   · You have symptoms of a urinary infection. For example:  ? You have blood or pus in your urine. ? You have pain in your back just below your rib cage. This is called flank pain. ? You have a fever, chills, or body aches. ? It hurts to urinate. ? You have groin or belly pain. Watch closely for changes in your health, and be sure to contact your doctor if:  · It hurts when you ejaculate. · Your urinary problems get a lot worse or bother you a lot. Where can you learn more? Go to http://www.gray.com/  Enter D025 in the search box to learn more about \"Benign Prostatic Hyperplasia: Care Instructions. \"  Current as of: February 11, 2020               Content Version: 12.5  © 2006-2020 [employer]. Care instructions adapted under license by [employer] (which disclaims liability or warranty for this information). If you have questions about a medical condition or this instruction, always ask your healthcare professional. [place] [address] any warranty or liability for your use of this information.

## 2020-08-18 NOTE — PROGRESS NOTES
[facility] Note    Emre Da Silva is a 69 y.o. male who was seen in clinic today (8/20/2020). Subjective:  Cardiovascular Review:  The patient has no known cardiovascular conditions. Diet and Lifestyle: generally follows a low fat low cholesterol diet, generally follows a low sodium diet, exercises sporadically, smoker cigar once per month  Home BP Monitoring: is not measured at home. Pertinent ROS: taking medications as instructed, no medication side effects noted, no TIA's, no chest pain on exertion, no dyspnea on exertion, no swelling of ankles.      Last colonoscopy in 6/2020 with Dr. Fallon Pritchard with benign findings.      BPH   Patient complains of lower urinary tract symptoms. Patient reports mild symptoms of BPH. Onset of symptoms was a few months ago and was gradual in onset. He reports irritative symptoms including frequency. He denies intermittency, weak stream.  Not currently taking Flomax. Last PSA 3.8 (10/2019)       Prior to Admission medications    Medication Sig Start Date End Date Taking? Authorizing Provider   potassium gluconate 595 mg (99 mg) TbER Take  by mouth. Yes Provider, Historical   OTHER neuriva   Yes Provider, Historical   biotin 5,000 mcg TbDi Take  by mouth. Yes Provider, Historical   multivitamin (ONE A DAY) tablet Take 1 Tab by mouth daily. Yes Provider, Historical   CHLORPHENIRAMINE/PHENYLEPHRINE (SINUS AND ALLERGY PE PO) Take  by mouth as needed. Provider, Historical          No Known Allergies        Review of Systems   Constitutional: Negative for malaise/fatigue and weight loss. Respiratory: Negative for shortness of breath. Cardiovascular: Negative for chest pain, palpitations and leg swelling. Gastrointestinal: Negative for heartburn. Musculoskeletal: Negative for back pain, joint pain and myalgias. Neurological: Negative for dizziness, weakness and headaches. Psychiatric/Behavioral: Negative for depression. Objective:   Physical Exam  Vitals signs and nursing note reviewed. Constitutional:       General: He is not in acute distress. Appearance: He is well-developed. HENT:      Right Ear: Tympanic membrane and ear canal normal.      Left Ear: Tympanic membrane and ear canal normal.      Nose: No mucosal edema. Right Sinus: No maxillary sinus tenderness or frontal sinus tenderness. Left Sinus: No maxillary sinus tenderness or frontal sinus tenderness. Eyes:      Pupils: Pupils are equal, round, and reactive to light. Neck:      Musculoskeletal: Normal range of motion and neck supple. Thyroid: No thyromegaly. Vascular: No carotid bruit or JVD. Cardiovascular:      Rate and Rhythm: Normal rate and regular rhythm. Heart sounds: Normal heart sounds. No murmur. No friction rub. No gallop. Pulmonary:      Effort: Pulmonary effort is normal. No respiratory distress. Breath sounds: Normal breath sounds. No decreased breath sounds, wheezing or rhonchi. Abdominal:      General: Bowel sounds are normal. There is no distension. Palpations: Abdomen is soft. Tenderness: There is no abdominal tenderness. Lymphadenopathy:      Cervical: No cervical adenopathy. Neurological:      Mental Status: He is alert and oriented to person, place, and time. Psychiatric:         Behavior: Behavior normal.           Visit Vitals  /74 (BP 1 Location: Right arm, BP Patient Position: Sitting)   Pulse 91   Temp 98.8 °F (37.1 °C) (Oral)   Resp 16   Ht 6' 1\" (1.854 m)   Wt 182 lb 6.4 oz (82.7 kg)   SpO2 95%   BMI 24.06 kg/m²       Assessment & Plan:  Diagnoses and all orders for this visit:    1.  Benign prostatic hyperplasia without lower urinary tract symptoms  Stable, no changes to current therapy  -     PSA W/ REFLX FREE PSA    2. Screening for endocrine, metabolic and immunity disorder  -     METABOLIC PANEL, COMPREHENSIVE  -     LIPID PANEL  -     PSA W/ REFLX FREE PSA  - CBC WITH AUTOMATED DIFF    3. Encounter for immunization  -     varicella-zoster recombinant, PF, (SHINGRIX) 50 mcg/0.5 mL susr injection; 0.5 mL by IntraMUSCular route once for 1 dose. -     ADMIN INFLUENZA VIRUS VAC  -     ZOSTER VACC RECOMBINANT ADJUVANTED      I have discussed the diagnosis with the patient and the intended plan as seen in the above orders. The patient has received an after-visit summary along with patient information handout. I have discussed medication side effects and warnings with the patient as well. Follow-up and Dispositions    · Return in about 6 months (around 2/18/2021) for disease management.            Greg Ellison, HUMA

## 2020-08-18 NOTE — PROGRESS NOTES
Chief Complaint   Patient presents with    Complete Physical     1. Have you been to the ER, urgent care clinic since your last visit? Hospitalized since your last visit? No    2. Have you seen or consulted any other health care providers outside of the [address] [place] [address] since your last visit? Include any pap smears or colon screening.  No         Patient presents in office for CPE    Reports having colonoscopy late June with unknown name was at [facility]

## 2020-08-19 LAB
ALBUMIN SERPL-MCNC: 4.3 G/DL (ref 3.7–4.7)
ALBUMIN/GLOB SERPL: 2 {RATIO} (ref 1.2–2.2)
ALP SERPL-CCNC: 71 IU/L (ref 39–117)
ALT SERPL-CCNC: 15 IU/L (ref 0–44)
AST SERPL-CCNC: 23 IU/L (ref 0–40)
BASOPHILS # BLD AUTO: 0.1 X10E3/UL (ref 0–0.2)
BASOPHILS NFR BLD AUTO: 1 %
BILIRUB SERPL-MCNC: 0.4 MG/DL (ref 0–1.2)
BUN SERPL-MCNC: 21 MG/DL (ref 8–27)
BUN/CREAT SERPL: 18 (ref 10–24)
CALCIUM SERPL-MCNC: 9.6 MG/DL (ref 8.6–10.2)
CHLORIDE SERPL-SCNC: 104 MMOL/L (ref 96–106)
CHOLEST SERPL-MCNC: 167 MG/DL (ref 100–199)
CO2 SERPL-SCNC: 23 MMOL/L (ref 20–29)
CREAT SERPL-MCNC: 1.14 MG/DL (ref 0.76–1.27)
EOSINOPHIL # BLD AUTO: 0.1 X10E3/UL (ref 0–0.4)
EOSINOPHIL NFR BLD AUTO: 1 %
ERYTHROCYTE [DISTWIDTH] IN BLOOD BY AUTOMATED COUNT: 13.3 % (ref 11.6–15.4)
GLOBULIN SER CALC-MCNC: 2.1 G/DL (ref 1.5–4.5)
GLUCOSE SERPL-MCNC: 101 MG/DL (ref 65–99)
HCT VFR BLD AUTO: 44.5 % (ref 37.5–51)
HDLC SERPL-MCNC: 63 MG/DL
HGB BLD-MCNC: 15.4 G/DL (ref 13–17.7)
IMM GRANULOCYTES # BLD AUTO: 0 X10E3/UL (ref 0–0.1)
IMM GRANULOCYTES NFR BLD AUTO: 0 %
INTERPRETATION, 910389: NORMAL
LDLC SERPL CALC-MCNC: 90 MG/DL (ref 0–99)
LYMPHOCYTES # BLD AUTO: 1.6 X10E3/UL (ref 0.7–3.1)
LYMPHOCYTES NFR BLD AUTO: 20 %
MCH RBC QN AUTO: 33.1 PG (ref 26.6–33)
MCHC RBC AUTO-ENTMCNC: 34.6 G/DL (ref 31.5–35.7)
MCV RBC AUTO: 96 FL (ref 79–97)
MONOCYTES # BLD AUTO: 0.8 X10E3/UL (ref 0.1–0.9)
MONOCYTES NFR BLD AUTO: 10 %
NEUTROPHILS # BLD AUTO: 5.6 X10E3/UL (ref 1.4–7)
NEUTROPHILS NFR BLD AUTO: 68 %
PLATELET # BLD AUTO: 245 X10E3/UL (ref 150–450)
POTASSIUM SERPL-SCNC: 4.2 MMOL/L (ref 3.5–5.2)
PROT SERPL-MCNC: 6.4 G/DL (ref 6–8.5)
PSA SERPL-MCNC: 3.5 NG/ML (ref 0–4)
RBC # BLD AUTO: 4.65 X10E6/UL (ref 4.14–5.8)
REFLEX CRITERIA: NORMAL
SODIUM SERPL-SCNC: 142 MMOL/L (ref 134–144)
TRIGL SERPL-MCNC: 72 MG/DL (ref 0–149)
VLDLC SERPL CALC-MCNC: 14 MG/DL (ref 5–40)
WBC # BLD AUTO: 8.2 X10E3/UL (ref 3.4–10.8)

## 2020-10-19 ENCOUNTER — DOCUMENTATION ONLY (OUTPATIENT)
Dept: FAMILY MEDICINE CLINIC | Age: 70
End: 2020-10-19

## 2020-10-19 ENCOUNTER — OFFICE VISIT (OUTPATIENT)
Dept: FAMILY MEDICINE CLINIC | Age: 70
End: 2020-10-19
Payer: MEDICARE

## 2020-10-19 VITALS
TEMPERATURE: 98.6 F | WEIGHT: 186.4 LBS | OXYGEN SATURATION: 96 % | BODY MASS INDEX: 24.7 KG/M2 | HEART RATE: 82 BPM | SYSTOLIC BLOOD PRESSURE: 118 MMHG | RESPIRATION RATE: 18 BRPM | DIASTOLIC BLOOD PRESSURE: 70 MMHG | HEIGHT: 73 IN

## 2020-10-19 DIAGNOSIS — Z23 NEEDS FLU SHOT: ICD-10-CM

## 2020-10-19 DIAGNOSIS — M79.644 PAIN OF RIGHT THUMB: Primary | ICD-10-CM

## 2020-10-19 DIAGNOSIS — Z23 ENCOUNTER FOR IMMUNIZATION: ICD-10-CM

## 2020-10-19 PROCEDURE — 90750 HZV VACC RECOMBINANT IM: CPT

## 2020-10-19 PROCEDURE — 99213 OFFICE O/P EST LOW 20 MIN: CPT | Performed by: FAMILY MEDICINE

## 2020-10-19 PROCEDURE — 90471 IMMUNIZATION ADMIN: CPT | Performed by: FAMILY MEDICINE

## 2020-10-19 PROCEDURE — 90694 VACC AIIV4 NO PRSRV 0.5ML IM: CPT

## 2020-10-19 PROCEDURE — G0463 HOSPITAL OUTPT CLINIC VISIT: HCPCS | Performed by: FAMILY MEDICINE

## 2020-10-19 NOTE — PROGRESS NOTES
Flu vaccine administered on  10/19/2020 to left deltoid by Salvador Louise LPN with patients consent. Shingrix administered to right deltoid  Patient tolerated procedure well. No reactions noted.      Patient provided with updated VIS sheet

## 2020-10-19 NOTE — PATIENT INSTRUCTIONS

## 2020-10-19 NOTE — PROGRESS NOTES
Catalina Pablo  69 y.o. male  1950  [facility] [address]  [address] [facility]       Encounter Date: 10/19/2020           Established Patient Visit Note: Gus De La Fuente MD    Reason for Appointment:  Chief Complaint   Patient presents with    Hand Swelling     right thumb swell since sunday       History of Present Illness:  History provided by patient    Catalina Pablo is a 69 y.o. male who presents to clinic today for:    He reports that Sunday morning he woke up right hand weakness. He has noticed some increasesed swelling over the base of the thumb. He does not remember injuring it in any way. He has also had some tenderness along the thenar eminence. Review of Systems  Review of Systems   Constitutional: Negative for chills and fever. Respiratory: Negative for cough, shortness of breath and wheezing. Cardiovascular: Negative for chest pain and palpitations. Allergies: Patient has no known allergies. Medications: (Updated to reflect final medication list after visit)    Current Outpatient Medications:     potassium gluconate 595 mg (99 mg) TbER, Take  by mouth., Disp: , Rfl:     biotin 5,000 mcg TbDi, Take  by mouth., Disp: , Rfl:     multivitamin (ONE A DAY) tablet, Take 1 Tab by mouth daily. , Disp: , Rfl:     CHLORPHENIRAMINE/PHENYLEPHRINE (SINUS AND ALLERGY PE PO), Take  by mouth as needed. , Disp: , Rfl:     OTHER, neuriva, Disp: , Rfl:     History  Patient Care Team:  Carlos Crawford NP as PCP - General (Nurse Practitioner)  Carlos Crawford NP [place] [facility]  Karmen Rojas MD (Cardiology)    Past Medical History: he has a past medical history of Allergic rhinitis, cause unspecified, Anxiety state, unspecified, Degenerative arthritis of lumbar spine (9/12), and Eustachian tube dysfunction.     Past Surgical History: he has a past surgical history that includes hx appendectomy (1953); endoscopy, colon, diagnostic (2007); hx orthopaedic (10/75); hx knee arthroscopy (6/02); and hx hernia repair (2/85). Family Medical History: family history includes Alcohol abuse in his father; Cancer in his father; Heart Disease in his mother. Social History: he reports that he has quit smoking. His smoking use included cigars. He quit after 10.00 years of use. He has never used smokeless tobacco. He reports current alcohol use. He reports that he does not use drugs. Health Maintenance Due   Topic Date Due    DTaP/Tdap/Td series (2 - Td) 09/18/2019    Medicare Yearly Exam  10/30/2020       Objective:   Visit Vitals  /70 (BP 1 Location: Left arm, BP Patient Position: Sitting)   Pulse 82   Temp 98.6 °F (37 °C) (Temporal)   Resp 18   Ht 6' 1\" (1.854 m)   Wt 186 lb 6.4 oz (84.6 kg)   SpO2 96%   BMI 24.59 kg/m²     Wt Readings from Last 3 Encounters:   10/19/20 186 lb 6.4 oz (84.6 kg)   08/18/20 182 lb 6.4 oz (82.7 kg)   10/30/19 193 lb 9.6 oz (87.8 kg)       Physical Exam  HENT:      Head: Normocephalic and atraumatic. Right Ear: External ear normal.      Left Ear: External ear normal.      Nose: Nose normal.      Mouth/Throat:      Pharynx: No oropharyngeal exudate. Eyes:      General: No scleral icterus. Right eye: No discharge. Left eye: No discharge. Conjunctiva/sclera: Conjunctivae normal.      Pupils: Pupils are equal, round, and reactive to light. Neck:      Musculoskeletal: Normal range of motion and neck supple. Thyroid: No thyromegaly. Vascular: No JVD. Trachea: No tracheal deviation. Cardiovascular:      Rate and Rhythm: Normal rate and regular rhythm. Heart sounds: Normal heart sounds. No murmur. No friction rub. No gallop. Pulmonary:      Effort: Pulmonary effort is normal. No respiratory distress. Breath sounds: Normal breath sounds. No stridor. No wheezing. Abdominal:      General: Bowel sounds are normal. There is no distension. Palpations: Abdomen is soft. There is no mass. Tenderness: There is no abdominal tenderness. There is no guarding or rebound. Musculoskeletal: Normal range of motion. General: No tenderness or deformity. Comments: Right Hand: Tenderness over thenar eminance   Lymphadenopathy:      Cervical: No cervical adenopathy. Skin:     General: Skin is warm and dry. Neurological:      Mental Status: He is alert. Cranial Nerves: No cranial nerve deficit. Coordination: Coordination normal.      Gait: Gait is intact. Deep Tendon Reflexes: Reflexes normal.         Assessment & Plan:      ICD-10-CM ICD-9-CM    1. Pain of right thumb  M79.644 729.5 REFERRAL TO ORTHOPEDICS      XR HAND RT MIN 3 V   2. Encounter for immunization  Z23 V03.89 ADMIN INFLUENZA VIRUS VAC      ZOSTER VACC RECOMBINANT ADJUVANTED   3. Needs flu shot  Z23 V04.81 FLU (FLUAD QUAD INFLUENZA VACCINE,QUAD,ADJUVANTED)     Right Thumb Pain: New problem. See orders above    I have discussed the diagnosis with the patient and the intended plan as seen in the above orders. The patient has received an after-visit summary along with patient information handout. I have discussed medication side effects and warnings with the patient as well. Discussed red flag symptoms and reasons to call or go to ED      Disposition  Follow-up and Dispositions    · Return if symptoms worsen or fail to improve.            Marilu Atwood MD

## 2020-10-19 NOTE — PROGRESS NOTES
Chief Complaint   Patient presents with    Hand Swelling     right thumb swell since sunday       1. Have you been to the ER, urgent care clinic since your last visit? Hospitalized since your last visit? No    2. Have you seen or consulted any other health care providers outside of the [address] [place] [address] since your last visit? Include any pap smears or colon screening. No    3 most recent PHQ Screens 9/3/2019   Little interest or pleasure in doing things Not at all   Feeling down, depressed, irritable, or hopeless Not at all   Total Score PHQ 2 0     Patient presents in office with c/o right thumb swelling and pain. Onset x Sunday. Patient denies any known triggers. Patient has strong uneven handgrips. Left stronger then right. Patient states he has pain in right thumb with different positions.

## 2020-10-19 NOTE — PROGRESS NOTES
Patient coming in for loss of  in right hand. Per patient/wife. No changes noted in speech. Spoke with patient, he was noted to have clear speech pattern. Per wife face is symmetrical. No changes noted in gait. Patient states he thinks he slept on his hand it is is giving him slight problems.

## 2020-11-16 ENCOUNTER — TELEPHONE (OUTPATIENT)
Dept: FAMILY MEDICINE CLINIC | Age: 70
End: 2020-11-16

## 2020-11-16 NOTE — TELEPHONE ENCOUNTER
----- Message from [place] sent at 11/16/2020  8:22 AM EST -----  Regarding: HUMA Rasheed/Telephone  General Message/Vendor Calls    Caller's first and last name: Tonja Mosquera      Reason for call: Wife is concerned with 's increased memory loss/mental health. Pt has been taking prevagen since January but wife is seeing more signs of confusion. Requesting a call back to discuss making a recommendation for a specialist or increased medication.        Callback required yes/no and why: yes      Best contact number(s):[phone]      Details to clarify the request: [address]

## 2020-11-17 NOTE — TELEPHONE ENCOUNTER
Returned call to patients wife who is on PHI. No answer left message I was returning her call regarding patient.  Suggested on name confirmed voicemail to schedule patient for appt to discuss concerns

## 2020-12-03 ENCOUNTER — TELEPHONE (OUTPATIENT)
Dept: FAMILY MEDICINE CLINIC | Age: 70
End: 2020-12-03

## 2020-12-03 NOTE — TELEPHONE ENCOUNTER
Pt's wife called stating the pt is in early stages of dementia and she wants Wili to refer pt to  and wants him to prescribe him something stronger than prevagen      BCB# [phone]

## 2020-12-09 ENCOUNTER — OFFICE VISIT (OUTPATIENT)
Dept: FAMILY MEDICINE CLINIC | Age: 70
End: 2020-12-09
Payer: MEDICARE

## 2020-12-09 VITALS
OXYGEN SATURATION: 96 % | RESPIRATION RATE: 16 BRPM | DIASTOLIC BLOOD PRESSURE: 76 MMHG | WEIGHT: 183.8 LBS | SYSTOLIC BLOOD PRESSURE: 122 MMHG | BODY MASS INDEX: 24.36 KG/M2 | HEART RATE: 99 BPM | HEIGHT: 73 IN | TEMPERATURE: 98.2 F

## 2020-12-09 DIAGNOSIS — R68.89 OTHER GENERAL SYMPTOMS AND SIGNS: ICD-10-CM

## 2020-12-09 DIAGNOSIS — G31.84 MILD COGNITIVE IMPAIRMENT: Primary | ICD-10-CM

## 2020-12-09 DIAGNOSIS — E55.9 VITAMIN D DEFICIENCY, UNSPECIFIED: ICD-10-CM

## 2020-12-09 DIAGNOSIS — Z00.00 MEDICARE ANNUAL WELLNESS VISIT, SUBSEQUENT: ICD-10-CM

## 2020-12-09 DIAGNOSIS — Z71.89 ACP (ADVANCE CARE PLANNING): ICD-10-CM

## 2020-12-09 PROCEDURE — G0463 HOSPITAL OUTPT CLINIC VISIT: HCPCS | Performed by: NURSE PRACTITIONER

## 2020-12-09 PROCEDURE — G0439 PPPS, SUBSEQ VISIT: HCPCS | Performed by: NURSE PRACTITIONER

## 2020-12-09 PROCEDURE — 3017F COLORECTAL CA SCREEN DOC REV: CPT | Performed by: NURSE PRACTITIONER

## 2020-12-09 PROCEDURE — 99214 OFFICE O/P EST MOD 30 MIN: CPT | Performed by: NURSE PRACTITIONER

## 2020-12-09 PROCEDURE — G8432 DEP SCR NOT DOC, RNG: HCPCS | Performed by: NURSE PRACTITIONER

## 2020-12-09 PROCEDURE — 1101F PT FALLS ASSESS-DOCD LE1/YR: CPT | Performed by: NURSE PRACTITIONER

## 2020-12-09 PROCEDURE — G8420 CALC BMI NORM PARAMETERS: HCPCS | Performed by: NURSE PRACTITIONER

## 2020-12-09 PROCEDURE — G8536 NO DOC ELDER MAL SCRN: HCPCS | Performed by: NURSE PRACTITIONER

## 2020-12-09 PROCEDURE — G8427 DOCREV CUR MEDS BY ELIG CLIN: HCPCS | Performed by: NURSE PRACTITIONER

## 2020-12-09 RX ORDER — DONEPEZIL HYDROCHLORIDE 5 MG/1
5 TABLET, FILM COATED ORAL
Qty: 30 TAB | Refills: 0 | Status: SHIPPED | OUTPATIENT
Start: 2020-12-09 | End: 2021-01-04

## 2020-12-09 NOTE — PROGRESS NOTES
This is the Subsequent Medicare Annual Wellness Exam, performed 12 months or more after the Initial AWV or the last Subsequent AWV    I have reviewed the patient's medical history in detail and updated the computerized patient record. Depression Risk Factor Screening:     3 most recent PHQ Screens 10/19/2020   Little interest or pleasure in doing things Not at all   Feeling down, depressed, irritable, or hopeless Not at all   Total Score PHQ 2 0       Alcohol Risk Screen   Do you average more than 1 drink per night or more than 7 drinks a week: No    In the past three months have you have had more than 4 drinks containing alcohol on one occasion: No        Functional Ability and Level of Safety:   Hearing: Hearing is good. Activities of Daily Living: The home contains: no safety equipment. Patient does total self care     Ambulation: with no difficulty     Fall Risk:  Fall Risk Assessment, last 12 mths 10/19/2020   Able to walk? Yes   Fall in past 12 months? No   Fall with injury? -   Number of falls in past 12 months -   Fall Risk Score -     Abuse Screen:  Patient is not abused       Cognitive Screening   Has your family/caregiver stated any concerns about your memory: yes - see office visit     Cognitive Screening: Abnormal - MMSE (Mini Mental Status Exam)    Assessment/Plan   Education and counseling provided:  Are appropriate based on today's review and evaluation    Diagnoses and all orders for this visit:    1. Mild cognitive impairment  -     CBC WITH AUTOMATED DIFF; Future  -     METABOLIC PANEL, COMPREHENSIVE; Future  -     VITAMIN D, 25 HYDROXY; Future  -     VITAMIN B12; Future  -     T PALLIDUM SCREEN W/REFLEX; Future  -     TSH 3RD GENERATION; Future  -     donepeziL (ARICEPT) 5 mg tablet; Take 1 Tab by mouth nightly. 2. Vitamin D deficiency, unspecified   -     VITAMIN D, 25 HYDROXY; Future    3. Other general symptoms and signs   -     VITAMIN B12; Future    4.  Medicare annual wellness visit, subsequent    5. ACP (advance care planning)        Health Maintenance Due     Health Maintenance Due   Topic Date Due    DTaP/Tdap/Td series (2 - Td) 09/18/2019    Medicare Yearly Exam  10/30/2020    GLAUCOMA SCREENING Q2Y  01/15/2021       Patient Care Team   Patient Care Team:  Jaleel Martinez NP as PCP - General (Nurse Practitioner)  Jaleel Martinez NP as PCP - [facility]  Cristina Dooley MD (Cardiology)    History     Patient Active Problem List   Diagnosis Code    AR (allergic rhinitis) J30.9    Anxiety disorder F41.9    Right inguinal hernia K40.90    ACP (advance care planning) Z71.89    Syncope R55     Past Medical History:   Diagnosis Date    Allergic rhinitis, cause unspecified     Anxiety state, unspecified     Degenerative arthritis of lumbar spine 9/12    Eustachian tube dysfunction       Past Surgical History:   Procedure Laterality Date    ENDOSCOPY, COLON, DIAGNOSTIC  2007    repeat 2014    HX APPENDECTOMY  1953         HX HERNIA REPAIR  2/85    HX KNEE ARTHROSCOPY  6/02    HX ORTHOPAEDIC  10/75    knee repair     Current Outpatient Medications   Medication Sig Dispense Refill    OTHER prevagen      donepeziL (ARICEPT) 5 mg tablet Take 1 Tab by mouth nightly. 30 Tab 0    potassium gluconate 595 mg (99 mg) TbER Take  by mouth.  OTHER neuriva      biotin 5,000 mcg TbDi Take  by mouth.  multivitamin (ONE A DAY) tablet Take 1 Tab by mouth daily.  CHLORPHENIRAMINE/PHENYLEPHRINE (SINUS AND ALLERGY PE PO) Take  by mouth as needed. No Known Allergies    Family History   Problem Relation Age of Onset    Heart Disease Mother         CAD s/p CABG 66's    Cancer Father     Alcohol abuse Father      Social History     Tobacco Use    Smoking status: Former Smoker     Years: 10.00     Types: Cigars    Smokeless tobacco: Never Used    Tobacco comment: off and on   Substance Use Topics    Alcohol use:  Yes

## 2020-12-09 NOTE — PROGRESS NOTES
Chief Complaint   Patient presents with    Dementia    Follow-up     was in hopstial for cellulitis      1. Have you been to the ER, urgent care clinic since your last visit? Hospitalized since your last visit? No    2. Have you seen or consulted any other health care providers outside of the [address] [place] [address] since your last visit? Include any pap smears or colon screening.  No

## 2020-12-09 NOTE — PATIENT INSTRUCTIONS
Dementia: Care Instructions  Your Care Instructions     Dementia is a loss of mental skills that affects your daily life. It is different than the occasional trouble with memory that is part of aging. You may find it hard to remember things that you feel you should be able to remember. Or you may feel that your mind is just not working as well as usual.  Finding out that you have dementia is a shock. You may be afraid and worried about how the condition will change your life. Although there is no cure at this time, medicine may slow memory loss and improve thinking for a while. Other medicines may be able to help you sleep or cope with depression and behavior changes. Dementia often gets worse slowly. But it can get worse quickly. As dementia gets worse, it may become harder to do common things that take planning, like making a list and going shopping. Over time, the disease may make it hard for you to take care of yourself. Some people with dementia need others to help care for them. Dementia is different for everyone. You may be able to function well for a long time. In the early stage of the condition, you can do things at home to make life easier and safer. You also can keep doing your hobbies and other activities. Many people find comfort in planning now for their future needs. Follow-up care is a key part of your treatment and safety. Be sure to make and go to all appointments, and call your doctor if you are having problems. It's also a good idea to know your test results and keep a list of the medicines you take. How can you care for yourself at home? · Take your medicines exactly as prescribed. Call your doctor if you think you are having a problem with your medicine. · Eat healthy foods. Eat lots of whole grains, fruits, and vegetables every day.  If you are not hungry, try snacks or nutritional drinks such as Boost, Ensure, or Sustacal.  · If you have problems sleeping:  ? Try not to nap too close to your bedtime. ? Exercise regularly. Walking is a good choice. ? Try a glass of warm milk or caffeine-free herbal tea before bed. · Do tasks and activities during the time of day when you feel your best. It may help to develop a daily routine. · Post labels, lists, and sticky notes to help you remember things. Write your activities on a calendar you can easily find. Put your clock where you can easily see it. · Stay active. Take walks in familiar places, or with friends or loved ones. Try to stay active mentally too. Read and work crossword puzzles if you enjoy these activities. · Do not drive unless you can pass an on-road driving test. If you are not sure if you are safe to drive, your state 's license bureau can test you. · Keep a cordless phone and a flashlight with new batteries by your bed. If possible, put a phone in each of the main rooms of your house, or carry a cell phone in case you fall and cannot reach a phone. Or, you can wear a device around your neck or wrist. You push a button that sends a signal for help. Acknowledge your emotions and plan for the future  · Talk openly and honestly with your doctor. · Let yourself grieve. It is common to feel angry, scared, frustrated, anxious, or depressed. · Get emotional support from family, friends, a support group, or a counselor experienced in working with people who have dementia. · Ask for help if you need it. · Tell your doctor how you feel. You may feel upset, angry, or worried at times. Many things can cause this, including poor sleep, medicine side effects, confusion, and pain. Your doctor may be able to help you. · Plan for the future. ? Talk to your family and doctor about preparing a living will and other important papers while you can make decisions. These papers tell your doctors how to care for you at the end of your life. ? Consider naming a person to make decisions about your care if you are not able to.   When should you call for help? Call 911 anytime you think you may need emergency care. For example, call if:    · You are lost and do not know whom to call.     · You are injured and do not know whom to call. Call your doctor now or seek immediate medical care if:    · You are more confused or upset than usual.     · You feel like you could hurt yourself because your mind is not working well. Watch closely for changes in your health, and be sure to contact your doctor if you have any problems. Where can you learn more? Go to http://www.gray.com/  Enter T143 in the search box to learn more about \"Dementia: Care Instructions. \"  Current as of: January 31, 2020               Content Version: 12.6  © 6432-6047 [employer]. Care instructions adapted under license by [employer] (which disclaims liability or warranty for this information). If you have questions about a medical condition or this instruction, always ask your healthcare professional. [place] [address] any warranty or liability for your use of this information.

## 2020-12-09 NOTE — PROGRESS NOTES
[facility] Note    Nikita Solorzano is a 69 y.o. male who was seen in clinic today (12/9/2020). Subjective:  Dementia  Patient is seen for assessment of dementia. He is accompanied by wife. Cognitive symptoms: worsened  Behavioral symptoms: not changed    The family and the patient identify problems with changes in short term memory, recalling words and repetition of questions. Family and patient report problems with agitation. Family and patient are concerned about  driving. Medication administration: patient self medicates    Functional Assessment:   Activities of Daily Living (ADLs):  He is independent in the following: ambulation, bathing and hygiene, feeding, continence, grooming, toileting and dressing  Requires assistance with the following: none      Prior to Admission medications    Medication Sig Start Date End Date Taking? Authorizing Provider   OTHER prevagen   Yes Provider, Historical   donepeziL (ARICEPT) 5 mg tablet Take 1 Tab by mouth nightly. 12/9/20  Yes Emperatriz Rasheed, NP   potassium gluconate 595 mg (99 mg) TbER Take  by mouth. Yes Provider, Historical   OTHER neuriva   Yes Provider, Historical   biotin 5,000 mcg TbDi Take  by mouth. Yes Provider, Historical   multivitamin (ONE A DAY) tablet Take 1 Tab by mouth daily. Yes Provider, Historical   CHLORPHENIRAMINE/PHENYLEPHRINE (SINUS AND ALLERGY PE PO) Take  by mouth as needed. Yes Provider, Historical          No Known Allergies        ROS  See HPI    Objective:   Physical Exam  Vitals signs and nursing note reviewed. Constitutional:       Appearance: He is well-developed. Neck:      Musculoskeletal: Normal range of motion and neck supple. Thyroid: No thyromegaly. Vascular: No carotid bruit or JVD. Cardiovascular:      Rate and Rhythm: Normal rate and regular rhythm. Heart sounds: No murmur. No friction rub. No gallop.     Pulmonary:      Effort: Pulmonary effort is normal. No respiratory distress. Breath sounds: Normal breath sounds. Lymphadenopathy:      Cervical: No cervical adenopathy. Neurological:      Mental Status: He is alert and oriented to person, place, and time. Comments: MMSE 26/30   Psychiatric:         Behavior: Behavior normal.           Visit Vitals  /76 (BP 1 Location: Left arm, BP Patient Position: Sitting)   Pulse 99   Temp 98.2 °F (36.8 °C) (Temporal)   Resp 16   Ht 6' 1\" (1.854 m)   Wt 183 lb 12.8 oz (83.4 kg)   SpO2 96%   BMI 24.25 kg/m²       Assessment & Plan:  Diagnoses and all orders for this visit:    1. Mild cognitive impairment  Early stage of dementia evident. Begin Aricept. Rule out endocrine, hematologic or metabolic etiology. Referral to neurology as needed. -     CBC WITH AUTOMATED DIFF; Future  -     METABOLIC PANEL, COMPREHENSIVE; Future  -     VITAMIN D, 25 HYDROXY; Future  -     VITAMIN B12; Future  -     T PALLIDUM SCREEN W/REFLEX; Future  -     TSH 3RD GENERATION; Future  -     donepeziL (ARICEPT) 5 mg tablet; Take 1 Tab by mouth nightly. I have discussed the diagnosis with the patient and the intended plan as seen in the above orders. The patient has received an after-visit summary along with patient information handout. I have discussed medication side effects and warnings with the patient as well. Follow-up and Dispositions    · Return in about 3 months (around 3/9/2021) for disease management.            Elodia Harris NP     I spent at least 25 minutes with this established patient, and >50% of the time was spent counseling and/or coordinating care regarding dementia

## 2020-12-10 ENCOUNTER — TELEPHONE (OUTPATIENT)
Dept: FAMILY MEDICINE CLINIC | Age: 70
End: 2020-12-10

## 2020-12-10 LAB
25(OH)D3 SERPL-MCNC: 42.8 NG/ML (ref 30–100)
ALBUMIN SERPL-MCNC: 4.3 G/DL (ref 3.5–5)
ALBUMIN/GLOB SERPL: 1.3 {RATIO} (ref 1.1–2.2)
ALP SERPL-CCNC: 81 U/L (ref 45–117)
ALT SERPL-CCNC: 22 U/L (ref 12–78)
ANION GAP SERPL CALC-SCNC: 7 MMOL/L (ref 5–15)
AST SERPL-CCNC: 17 U/L (ref 15–37)
BASOPHILS # BLD: 0.1 K/UL (ref 0–0.1)
BASOPHILS NFR BLD: 1 % (ref 0–1)
BILIRUB SERPL-MCNC: 0.5 MG/DL (ref 0.2–1)
BUN SERPL-MCNC: 17 MG/DL (ref 6–20)
BUN/CREAT SERPL: 15 (ref 12–20)
CALCIUM SERPL-MCNC: 10 MG/DL (ref 8.5–10.1)
CHLORIDE SERPL-SCNC: 107 MMOL/L (ref 97–108)
CO2 SERPL-SCNC: 28 MMOL/L (ref 21–32)
CREAT SERPL-MCNC: 1.1 MG/DL (ref 0.7–1.3)
DIFFERENTIAL METHOD BLD: NORMAL
EOSINOPHIL # BLD: 0.1 K/UL (ref 0–0.4)
EOSINOPHIL NFR BLD: 1 % (ref 0–7)
ERYTHROCYTE [DISTWIDTH] IN BLOOD BY AUTOMATED COUNT: 13.9 % (ref 11.5–14.5)
GLOBULIN SER CALC-MCNC: 3.3 G/DL (ref 2–4)
GLUCOSE SERPL-MCNC: 92 MG/DL (ref 65–100)
HCT VFR BLD AUTO: 49.5 % (ref 36.6–50.3)
HGB BLD-MCNC: 15.6 G/DL (ref 12.1–17)
IMM GRANULOCYTES # BLD AUTO: 0 K/UL (ref 0–0.04)
IMM GRANULOCYTES NFR BLD AUTO: 0 % (ref 0–0.5)
LYMPHOCYTES # BLD: 1.7 K/UL (ref 0.8–3.5)
LYMPHOCYTES NFR BLD: 17 % (ref 12–49)
MCH RBC QN AUTO: 31.1 PG (ref 26–34)
MCHC RBC AUTO-ENTMCNC: 31.5 G/DL (ref 30–36.5)
MCV RBC AUTO: 98.8 FL (ref 80–99)
MONOCYTES # BLD: 0.7 K/UL (ref 0–1)
MONOCYTES NFR BLD: 7 % (ref 5–13)
NEUTS SEG # BLD: 7.7 K/UL (ref 1.8–8)
NEUTS SEG NFR BLD: 74 % (ref 32–75)
NRBC # BLD: 0 K/UL (ref 0–0.01)
NRBC BLD-RTO: 0 PER 100 WBC
PLATELET # BLD AUTO: 329 K/UL (ref 150–400)
PMV BLD AUTO: 11 FL (ref 8.9–12.9)
POTASSIUM SERPL-SCNC: 4.3 MMOL/L (ref 3.5–5.1)
PROT SERPL-MCNC: 7.6 G/DL (ref 6.4–8.2)
RBC # BLD AUTO: 5.01 M/UL (ref 4.1–5.7)
SODIUM SERPL-SCNC: 142 MMOL/L (ref 136–145)
T PALLIDUM AB SER QL IA: NON REACTIVE
TSH SERPL DL<=0.05 MIU/L-ACNC: 1.17 UIU/ML (ref 0.36–3.74)
VIT B12 SERPL-MCNC: 778 PG/ML (ref 193–986)
WBC # BLD AUTO: 10.3 K/UL (ref 4.1–11.1)

## 2020-12-10 NOTE — TELEPHONE ENCOUNTER
Barbdee Rafael (wife) called stated  was diagnoised with early dementia by provider and wants  to see a neurology for additional testing. Wants to know if it's okay for  to continue driving. Requesting a call back.       Best call back  # [phone]

## 2021-01-04 DIAGNOSIS — G31.84 MILD COGNITIVE IMPAIRMENT: ICD-10-CM

## 2021-01-04 RX ORDER — DONEPEZIL HYDROCHLORIDE 10 MG/1
10 TABLET, FILM COATED ORAL
Qty: 90 TAB | Refills: 1 | Status: SHIPPED | OUTPATIENT
Start: 2021-01-04 | End: 2021-07-15 | Stop reason: SDUPTHER

## 2021-02-03 ENCOUNTER — OFFICE VISIT (OUTPATIENT)
Dept: FAMILY MEDICINE CLINIC | Age: 71
End: 2021-02-03
Payer: MEDICARE

## 2021-02-03 VITALS
HEIGHT: 73 IN | SYSTOLIC BLOOD PRESSURE: 120 MMHG | DIASTOLIC BLOOD PRESSURE: 80 MMHG | TEMPERATURE: 98.9 F | RESPIRATION RATE: 16 BRPM | WEIGHT: 189.8 LBS | HEART RATE: 77 BPM | BODY MASS INDEX: 25.15 KG/M2 | OXYGEN SATURATION: 97 %

## 2021-02-03 DIAGNOSIS — M17.11 PRIMARY OSTEOARTHRITIS OF RIGHT KNEE: ICD-10-CM

## 2021-02-03 DIAGNOSIS — Z01.818 PREOP EXAMINATION: Primary | ICD-10-CM

## 2021-02-03 PROCEDURE — G0463 HOSPITAL OUTPT CLINIC VISIT: HCPCS | Performed by: NURSE PRACTITIONER

## 2021-02-03 PROCEDURE — G8419 CALC BMI OUT NRM PARAM NOF/U: HCPCS | Performed by: NURSE PRACTITIONER

## 2021-02-03 PROCEDURE — 99214 OFFICE O/P EST MOD 30 MIN: CPT | Performed by: NURSE PRACTITIONER

## 2021-02-03 PROCEDURE — 3017F COLORECTAL CA SCREEN DOC REV: CPT | Performed by: NURSE PRACTITIONER

## 2021-02-03 PROCEDURE — G8427 DOCREV CUR MEDS BY ELIG CLIN: HCPCS | Performed by: NURSE PRACTITIONER

## 2021-02-03 PROCEDURE — 1101F PT FALLS ASSESS-DOCD LE1/YR: CPT | Performed by: NURSE PRACTITIONER

## 2021-02-03 PROCEDURE — G8536 NO DOC ELDER MAL SCRN: HCPCS | Performed by: NURSE PRACTITIONER

## 2021-02-03 PROCEDURE — G8432 DEP SCR NOT DOC, RNG: HCPCS | Performed by: NURSE PRACTITIONER

## 2021-02-03 NOTE — PATIENT INSTRUCTIONS
Total Knee Replacement: Before Your Surgery What is a total knee replacement? A total knee replacement replaces the worn ends of the bones where they meet at the knee. Those bones are the thighbone (femur) and the lower leg bone (tibia). Your doctor will remove the damaged bone. Then he or she will replace it with plastic and metal parts. These new parts may be attached to your bones with cement. Your doctor will make a cut down the center of your knee. This cut is called an incision. It will be about 8 to 10 inches long. Sometimes the surgery can be done with a smaller incision that is 4 to 6 inches. Both kinds of incisions leave scars that usually fade with time. Your doctor will let you know if you will stay in the hospital or if you can go home the day of surgery. If you have both knees done at the same time, you may need to be in the hospital for a few days. If you need more extensive rehab, you may go to a specialized rehab center for more treatment. Most people go back to normal activities or work in 4 to 16 weeks. This depends on your health. It also depends on how well your knee does in your rehab program. This may take longer if you have both knees done at the same time. Follow-up care is a key part of your treatment and safety. Be sure to make and go to all appointments, and call your doctor if you are having problems. It's also a good idea to know your test results and keep a list of the medicines you take. How do you prepare for surgery? Surgery can be stressful. This information will help you understand what you can expect. And it will help you safely prepare for surgery. Preparing for surgery 
  · You may need to shower or bathe with a special soap the night before and the morning of your surgery. The soap contains chlorhexidine. It reduces the amount of bacteria on your skin that could cause an infection after surgery.   · Be sure you have someone to take you home. Anesthesia and pain medicine will make it unsafe for you to drive or get home on your own.  
  · Understand exactly what surgery is planned, along with the risks, benefits, and other options.  
  · If you take aspirin or some other blood thinner, ask your doctor if you should stop taking it before your surgery. Make sure that you understand exactly what your doctor wants you to do. These medicines increase the risk of bleeding.  
  · Tell your doctor ALL the medicines, vitamins, supplements, and herbal remedies you take. Some may increase the risk of problems during your surgery. Your doctor will tell you if you should stop taking any of them before the surgery and how soon to do it.  
  · Make sure your doctor and the hospital have a copy of your advance directive. If you don't have one, you may want to prepare one. It lets others know your health care wishes. It's a good thing to have before any type of surgery or procedure. What happens on the day of surgery? · Follow the instructions exactly about when to stop eating and drinking. If you don't, your surgery may be canceled. If your doctor told you to take your medicines on the day of surgery, take them with only a sip of water.  
  · Take a bath or shower before you come in for your surgery. Do not apply lotions, perfumes, deodorants, or nail polish.  
  · Do not shave the surgical site yourself.  
  · Take off all jewelry and piercings. And take out contact lenses, if you wear them. At the hospital or surgery center · Bring a picture ID.  
  · The area for surgery is often marked to make sure there are no errors.  
  · You will be kept comfortable and safe by your anesthesia provider. The anesthesia may make you sleep. Or it may just numb the area being worked on.  
  · You also will get antibiotics through the IV tube before surgery. This lowers the risk of an infection of the incision.   · The surgery will take about 2 to 3 hours. When should you call your doctor? · You have questions or concerns.  
  · You don't understand how to prepare for your surgery.  
  · You become ill before the surgery (such as fever, flu, or a cold).  
  · You need to reschedule or have changed your mind about having the surgery. Where can you learn more? Go to http://www.gray.com/ Enter I973 in the search box to learn more about \"Total Knee Replacement: Before Your Surgery. \" Current as of: March 2, 2020               Content Version: 12.6 © 9521-1283 [employer]. Care instructions adapted under license by [employer] (which disclaims liability or warranty for this information). If you have questions about a medical condition or this instruction, always ask your healthcare professional. [address] any warranty or liability for your use of this information.

## 2021-02-03 NOTE — PROGRESS NOTES
Preoperative Evaluation    Date of Exam: 2/3/2021    Asael Elizabeth is a 69 y.o. male (:1950) who presents for preoperative evaluation. Procedure/Surgery:Right knee replacement  Date of Procedure/Surgery: 21   Surgeon: Dr. Cid Expose: [address] [facility]  Primary Physician: Emilie Casey NP  Latex Allergy: no    Medical History:     Past Medical History:   Diagnosis Date    Allergic rhinitis, cause unspecified     Anxiety state, unspecified     Degenerative arthritis of lumbar spine     Eustachian tube dysfunction      Allergies:   No Known Allergies    Medications:     Current Outpatient Medications   Medication Sig    donepeziL (ARICEPT) 10 mg tablet Take 1 Tab by mouth nightly.  potassium gluconate 595 mg (99 mg) TbER Take  by mouth.  biotin 5,000 mcg TbDi Take  by mouth.  multivitamin (ONE A DAY) tablet Take 1 Tab by mouth daily.  OTHER prevagen    OTHER neuriva    CHLORPHENIRAMINE/PHENYLEPHRINE (SINUS AND ALLERGY PE PO) Take  by mouth as needed. No current facility-administered medications for this visit. Surgical History:     Past Surgical History:   Procedure Laterality Date    ENDOSCOPY, COLON, DIAGNOSTIC      repeat     HX APPENDECTOMY           HX HERNIA REPAIR      HX KNEE ARTHROSCOPY      HX ORTHOPAEDIC  10/75    knee repair     Social History:     Socioeconomic History    Marital status:      Spouse name: Not on file    Number of children: Not on file    Years of education: Not on file    Highest education level: Not on file   Occupational History    Occupation:  ed   Tobacco Use    Smoking status: Former Smoker     Years: 10.00     Types: Cigars    Smokeless tobacco: Never Used    Tobacco comment: off and on   Substance and Sexual Activity    Alcohol use:  Yes    Drug use: No    Sexual activity: Yes     Partners: Female       Recent use of: No recent use of aspirin (ASA), NSAIDS or steroids    Anesthesia Complications: None  History of abnormal bleeding : None  History of Blood Transfusions: no  Health Care Directive or Living Will: no    REVIEW OF SYSTEMS:  A comprehensive review of systems was negative. Physical Exam  Visit Vitals  /80 (BP 1 Location: Left upper arm, BP Patient Position: Sitting, BP Cuff Size: Adult)   Pulse 77   Temp 98.9 °F (37.2 °C) (Temporal)   Resp 16   Ht 6' 1\" (1.854 m)   Wt 189 lb 12.8 oz (86.1 kg)   SpO2 97%   BMI 25.04 kg/m²     General appearance: alert, well appearing, and in no distress. Chest: clear to auscultation, no wheezes, rales or rhonchi, symmetric air entry. CVS exam: normal rate, regular rhythm, normal S1, S2, no murmurs, rubs, clicks or gallops, normal bilateral carotid upstroke without bruits, no JVD. Exam of extremities: peripheral pulses normal, no pedal edema, no clubbing or cyanosis        IMPRESSION:   Based on the above evaluation, the patient is stable and cleared for surgery.         Declan Bowman NP   2/3/2021

## 2021-02-03 NOTE — PROGRESS NOTES
Chief Complaint   Patient presents with    Pre-op Exam     right TKR 2/25/21 Dr Willard Peña      1. Have you been to the ER, urgent care clinic since your last visit? Hospitalized since your last visit? No    2. Have you seen or consulted any other health care providers outside of the [address] [place] [address] since your last visit? Include any pap smears or colon screening.  Yes When: Dr Willard Peña ortho

## 2021-04-21 ENCOUNTER — OFFICE VISIT (OUTPATIENT)
Dept: FAMILY MEDICINE CLINIC | Age: 71
End: 2021-04-21
Payer: MEDICARE

## 2021-04-21 VITALS
HEART RATE: 82 BPM | TEMPERATURE: 98.5 F | HEIGHT: 73 IN | OXYGEN SATURATION: 97 % | DIASTOLIC BLOOD PRESSURE: 80 MMHG | WEIGHT: 185 LBS | BODY MASS INDEX: 24.52 KG/M2 | RESPIRATION RATE: 16 BRPM | SYSTOLIC BLOOD PRESSURE: 134 MMHG

## 2021-04-21 DIAGNOSIS — M50.30 DDD (DEGENERATIVE DISC DISEASE), CERVICAL: Primary | ICD-10-CM

## 2021-04-21 DIAGNOSIS — F51.05 INSOMNIA DUE TO OTHER MENTAL DISORDER: ICD-10-CM

## 2021-04-21 DIAGNOSIS — G31.84 MILD COGNITIVE IMPAIRMENT: ICD-10-CM

## 2021-04-21 DIAGNOSIS — F99 INSOMNIA DUE TO OTHER MENTAL DISORDER: ICD-10-CM

## 2021-04-21 PROCEDURE — G8427 DOCREV CUR MEDS BY ELIG CLIN: HCPCS | Performed by: NURSE PRACTITIONER

## 2021-04-21 PROCEDURE — G8536 NO DOC ELDER MAL SCRN: HCPCS | Performed by: NURSE PRACTITIONER

## 2021-04-21 PROCEDURE — 3017F COLORECTAL CA SCREEN DOC REV: CPT | Performed by: NURSE PRACTITIONER

## 2021-04-21 PROCEDURE — G0463 HOSPITAL OUTPT CLINIC VISIT: HCPCS | Performed by: NURSE PRACTITIONER

## 2021-04-21 PROCEDURE — G8420 CALC BMI NORM PARAMETERS: HCPCS | Performed by: NURSE PRACTITIONER

## 2021-04-21 PROCEDURE — 1101F PT FALLS ASSESS-DOCD LE1/YR: CPT | Performed by: NURSE PRACTITIONER

## 2021-04-21 PROCEDURE — G8432 DEP SCR NOT DOC, RNG: HCPCS | Performed by: NURSE PRACTITIONER

## 2021-04-21 PROCEDURE — 99214 OFFICE O/P EST MOD 30 MIN: CPT | Performed by: NURSE PRACTITIONER

## 2021-04-21 RX ORDER — AMOXICILLIN 500 MG/1
CAPSULE ORAL
COMMUNITY
Start: 2021-03-12 | End: 2021-04-21 | Stop reason: ALTCHOICE

## 2021-04-21 RX ORDER — ACETAMINOPHEN 500 MG
1000 TABLET ORAL
COMMUNITY
Start: 2021-02-26 | End: 2021-08-24

## 2021-04-21 RX ORDER — SERTRALINE HYDROCHLORIDE 25 MG/1
25 TABLET, FILM COATED ORAL DAILY
Qty: 90 TAB | Refills: 1 | Status: SHIPPED | OUTPATIENT
Start: 2021-04-21 | End: 2021-07-13

## 2021-04-21 RX ORDER — TRAZODONE HYDROCHLORIDE 50 MG/1
50 TABLET ORAL
Qty: 90 TAB | Refills: 1 | Status: SHIPPED | OUTPATIENT
Start: 2021-04-21 | End: 2021-10-11 | Stop reason: SDUPTHER

## 2021-04-21 RX ORDER — ASPIRIN 81 MG/1
81 TABLET ORAL
COMMUNITY
Start: 2021-02-26 | End: 2021-07-13

## 2021-04-21 NOTE — PROGRESS NOTES
[facility] Note    Florian Canchola is a 66 y.o. male who was seen in clinic today (4/21/2021). Subjective:  Dementia  Patient is seen for assessment of dementia. He is accompanied by wife. Cognitive symptoms: worsened  Behavioral symptoms: not changed    The family and the patient identify problems with changes in short term memory, recalling words and repetition of questions. Family and patient report problems with agitation. Family and patient are concerned about  driving. Medication administration: patient self medicates    Functional Assessment:   Activities of Daily Living (ADLs):  He is independent in the following: ambulation, bathing and hygiene, feeding, continence, grooming, toileting and dressing  Requires assistance with the following: none    Neck Pain  Patient complains of neck pain. Onset of symptoms was several months ago, unchanged since that time. Current symptoms are pain in neck (stiffness in character; 3/10 in severity). Patient denies numbness, tingling, paresthesias in upper extremities. Patient denies weakness, diminished  strength, lack of coordination. Prior to Admission medications    Medication Sig Start Date End Date Taking? Authorizing Provider   POTASSIUM CHLORIDE  mg. Yes Provider, Historical   traZODone (DESYREL) 50 mg tablet Take 1 Tab by mouth nightly. 4/21/21  Yes Kathie Dobson NP   sertraline (ZOLOFT) 25 mg tablet Take 1 Tab by mouth daily. For anxiety and depression 4/21/21  Yes Ibis Rasheed NP   donepeziL (ARICEPT) 10 mg tablet Take 1 Tab by mouth nightly. 1/4/21  Yes Ibis Rasheed NP   multivit-min/ferrous fumarate (MULTI VITAMIN PO) 1 Tab.     Provider, Historical   acetaminophen (TYLENOL) 500 mg tablet 1,000 mg. 2/26/21   Provider, Historical   aspirin delayed-release 81 mg tablet 81 mg. 2/26/21   Provider, Historical   amoxicillin (AMOXIL) 500 mg capsule TAKE 4 CAPSULES BY MOUTH 1 HOUR PRIOR TO PROCEDURE OR DENTAL VISIT. 3/12/21 4/21/21  Provider, Historical   amoxicillin (AMOXIL) 500 mg capsule 4 tabs po 1 hour prior to procedure or dental visit. 3/12/21 4/21/21  Provider, Historical   OTHER prevagen    Provider, Historical   OTHER neuriva    Provider, Historical   biotin 5,000 mcg TbDi Take  by mouth. Provider, Historical   potassium gluconate 595 mg (99 mg) TbER Take  by mouth.  4/21/21  Provider, Historical   multivitamin (ONE A DAY) tablet Take 1 Tab by mouth daily. Provider, Historical   CHLORPHENIRAMINE/PHENYLEPHRINE (SINUS AND ALLERGY PE PO) Take  by mouth as needed. Provider, Historical          No Known Allergies        ROS  See HPI    Objective:   Physical Exam  Vitals signs and nursing note reviewed. Constitutional:       Appearance: He is well-developed. Cardiovascular:      Rate and Rhythm: Normal rate and regular rhythm. Heart sounds: No murmur. No friction rub. No gallop. Pulmonary:      Effort: Pulmonary effort is normal. No respiratory distress. Breath sounds: Normal breath sounds. Musculoskeletal:      Cervical back: He exhibits decreased range of motion (with extension and flexion). He exhibits no tenderness and no bony tenderness. Neurological:      Mental Status: He is alert and oriented to person, place, and time. Psychiatric:         Speech: Speech normal.         Behavior: Behavior normal.         Thought Content: Thought content normal.         Visit Vitals  /80 (BP 1 Location: Left arm, BP Patient Position: Sitting, BP Cuff Size: Adult)   Pulse 82   Temp 98.5 °F (36.9 °C) (Temporal)   Resp 16   Ht 6' 1\" (1.854 m)   Wt 185 lb (83.9 kg)   SpO2 97%   BMI 24.41 kg/m²       Assessment & Plan:  Diagnoses and all orders for this visit:    1. DDD (degenerative disc disease), cervical  X-ray to evaluate further. Request physical therapy evaluation and treatment. -     XR SPINE CERV 4 OR 5 V; Future    2. Mild cognitive impairment  Continue Aricept. Add SSRI. -     Begin sertraline (ZOLOFT) 25 mg tablet; Take 1 Tab by mouth daily. For anxiety and depression    3. Insomnia due to other mental disorder  -     Begin traZODone (DESYREL) 50 mg tablet; Take 1 Tab by mouth nightly. I have discussed the diagnosis with the patient and the intended plan as seen in the above orders. The patient has received an after-visit summary along with patient information handout. I have discussed medication side effects and warnings with the patient as well. Follow-up and Dispositions    · Return in about 4 weeks (around 5/19/2021) for Annual Exam - 30 minutes.            Corrinne Genera, NP

## 2021-04-21 NOTE — PATIENT INSTRUCTIONS
Neck: Exercises  Introduction  Here are some examples of exercises for you to try. The exercises may be suggested for a condition or for rehabilitation. Start each exercise slowly. Ease off the exercises if you start to have pain. You will be told when to start these exercises and which ones will work best for you. How to do the exercises  Neck stretch   1. This stretch works best if you keep your shoulder down as you lean away from it. To help you remember to do this, start by relaxing your shoulders and lightly holding on to your thighs or your chair. 2. Tilt your head toward your shoulder and hold for 15 to 30 seconds. Let the weight of your head stretch your muscles. 3. If you would like a little added stretch, use your hand to gently and steadily pull your head toward your shoulder. For example, keeping your right shoulder down, lean your head to the left. 4. Repeat 2 to 4 times toward each shoulder. Diagonal neck stretch   1. Turn your head slightly toward the direction you will be stretching, and tilt your head diagonally toward your chest and hold for 15 to 30 seconds. 2. If you would like a little added stretch, use your hand to gently and steadily pull your head forward on the diagonal.  3. Repeat 2 to 4 times toward each side. Dorsal glide stretch   The dorsal glide stretches the back of the neck. If you feel pain, do not glide so far back. Some people find this exercise easier to do while lying on their backs with an ice pack on the neck. 1. Sit or stand tall and look straight ahead. 2. Slowly tuck your chin as you glide your head backward over your body  3. Hold for a count of 6, and then relax for up to 10 seconds. 4. Repeat 8 to 12 times. Chest and shoulder stretch   1. Sit or stand tall and glide your head backward as in the dorsal glide stretch. 2. Raise both arms so that your hands are next to your ears.   3. Take a deep breath, and as you breathe out, lower your elbows down and behind your back. You will feel your shoulder blades slide down and together, and at the same time you will feel a stretch across your chest and the front of your shoulders. 4. Hold for about 6 seconds, and then relax for up to 10 seconds. 5. Repeat 8 to 12 times. Strengthening: Hands on head   1. Move your head backward, forward, and side to side against gentle pressure from your hands, holding each position for about 6 seconds. 2. Repeat 8 to 12 times. Follow-up care is a key part of your treatment and safety. Be sure to make and go to all appointments, and call your doctor if you are having problems. It's also a good idea to know your test results and keep a list of the medicines you take. Where can you learn more? Go to http://www.allred.com/  Enter P975 in the search box to learn more about \"Neck: Exercises. \"  Current as of: November 16, 2020               Content Version: 12.8  © 2006-2021 [employer]orporated. Care instructions adapted under license by [employer] (which disclaims liability or warranty for this information). If you have questions about a medical condition or this instruction, always ask your healthcare professional. [address] any warranty or liability for your use of this information.

## 2021-04-21 NOTE — PROGRESS NOTES
Chief Complaint   Patient presents with    Anxiety     follow up     Other     pain when laying on back, pain all over body. 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 Have you been to the ER, urgent care clinic since your last visit? Yes, ER for fainting episode  Hospitalized since your last visit? No    2. Have you seen or consulted any other health care providers outside of the [address] [place] [address] since your last visit? Yes, had right knee surgery. Once released from hospital an episode of fainting. Patient was transported to [facility] [place] for evaluation and sent home. Include any pap smears or colon screening.  No

## 2021-04-22 ENCOUNTER — PATIENT MESSAGE (OUTPATIENT)
Dept: FAMILY MEDICINE CLINIC | Age: 71
End: 2021-04-22

## 2021-04-22 NOTE — LETTER
5/14/2021 10:36 AM 
 
Mr. Flip Chilel [address] [place] [id number] This message is to inform you that the patient has not yet read the following message. (Notification date: May 6, 2021)  
test Results From Augustina Mobley LPN To  
Flip Chilel Sent and Delivered 4/22/2021 10:21 AM  
Patient's x-ray shows that he has moderate to severe degenerative disc disease of his neck.  I would recommend physical therapy as we discussed for range of motion.  We can refer him to orthopedics if he is having pain.  
 
 
   
XR SPINE CERV 4 OR 5 V Order: [id number] Status:  Final result   Visible to patient:  Yes (MyChart) Dx:  DDD (degenerative disc disease), cerv. .. Details Reading Physician Reading Date Result Priority Leslie Stern MD  
[phone] 4/21/2021    
   
Narrative & Impression   EXAM: XR SPINE CERV 4 OR 5 V  
  INDICATION: Cervical spine degenerative disc disease.  
   
COMPARISON: None.  
   
TECHNIQUE: 5 views cervical spine.  
   
FINDINGS: The skull base through the top of T1 is imaged. 4 mm anterior  
subluxation of C6 on C7. 1 mm anterior subluxation of C3 on C4. Cervical  
kyphosis is mild and centered at C4. There is no acute fracture. The  
prevertebral soft tissues are within normal limits. Moderate-severe degenerative  
disc disease at C5-C6. Moderate degenerative disc disease at C4-5 and C6-7. Multilevel facet arthrosis is at least moderate in the lower cervical spine. Right more than left foraminal narrowing. Mild C1-C2 osteoarthritis. Lung apices  
are clear.  
   
IMPRESSION  
   
1. No fracture. 2. Degenerative disc disease and degenerative subluxations. Multilevel foraminal  
stenoses.  
   
   
   
Specimen Collected: 04/21/21 13:07  
   
    
  
[employer] User Last Read On  
Flip Chilel Not Read Sincerely, [employer] Provider

## 2021-05-19 ENCOUNTER — OFFICE VISIT (OUTPATIENT)
Dept: FAMILY MEDICINE CLINIC | Age: 71
End: 2021-05-19
Payer: MEDICARE

## 2021-05-19 VITALS
RESPIRATION RATE: 16 BRPM | BODY MASS INDEX: 24.52 KG/M2 | OXYGEN SATURATION: 97 % | WEIGHT: 185 LBS | HEART RATE: 80 BPM | TEMPERATURE: 99.7 F | DIASTOLIC BLOOD PRESSURE: 79 MMHG | SYSTOLIC BLOOD PRESSURE: 141 MMHG | HEIGHT: 73 IN

## 2021-05-19 DIAGNOSIS — G47.33 OBSTRUCTIVE SLEEP APNEA SYNDROME: ICD-10-CM

## 2021-05-19 DIAGNOSIS — N40.0 BENIGN PROSTATIC HYPERPLASIA WITHOUT LOWER URINARY TRACT SYMPTOMS: ICD-10-CM

## 2021-05-19 DIAGNOSIS — F99 INSOMNIA DUE TO OTHER MENTAL DISORDER: ICD-10-CM

## 2021-05-19 DIAGNOSIS — G50.0 LEFT-SIDED TRIGEMINAL NEURALGIA: ICD-10-CM

## 2021-05-19 DIAGNOSIS — F51.05 INSOMNIA DUE TO OTHER MENTAL DISORDER: ICD-10-CM

## 2021-05-19 DIAGNOSIS — G31.84 MILD COGNITIVE IMPAIRMENT: Primary | ICD-10-CM

## 2021-05-19 PROBLEM — R55 SYNCOPE: Status: RESOLVED | Noted: 2019-08-31 | Resolved: 2021-05-19

## 2021-05-19 LAB
ALBUMIN SERPL-MCNC: 3.7 G/DL (ref 3.5–5)
ALBUMIN/GLOB SERPL: 1.1 {RATIO} (ref 1.1–2.2)
ALP SERPL-CCNC: 85 U/L (ref 45–117)
ALT SERPL-CCNC: 24 U/L (ref 12–78)
ANION GAP SERPL CALC-SCNC: 5 MMOL/L (ref 5–15)
AST SERPL-CCNC: 21 U/L (ref 15–37)
BASOPHILS # BLD: 0.1 K/UL (ref 0–0.1)
BASOPHILS NFR BLD: 1 % (ref 0–1)
BILIRUB SERPL-MCNC: 0.4 MG/DL (ref 0.2–1)
BUN SERPL-MCNC: 19 MG/DL (ref 6–20)
BUN/CREAT SERPL: 22 (ref 12–20)
CALCIUM SERPL-MCNC: 9.3 MG/DL (ref 8.5–10.1)
CHLORIDE SERPL-SCNC: 104 MMOL/L (ref 97–108)
CO2 SERPL-SCNC: 29 MMOL/L (ref 21–32)
CREAT SERPL-MCNC: 0.88 MG/DL (ref 0.7–1.3)
DIFFERENTIAL METHOD BLD: ABNORMAL
EOSINOPHIL # BLD: 0.1 K/UL (ref 0–0.4)
EOSINOPHIL NFR BLD: 1 % (ref 0–7)
ERYTHROCYTE [DISTWIDTH] IN BLOOD BY AUTOMATED COUNT: 14.9 % (ref 11.5–14.5)
GLOBULIN SER CALC-MCNC: 3.4 G/DL (ref 2–4)
GLUCOSE SERPL-MCNC: 96 MG/DL (ref 65–100)
HCT VFR BLD AUTO: 45.8 % (ref 36.6–50.3)
HGB BLD-MCNC: 14.1 G/DL (ref 12.1–17)
IMM GRANULOCYTES # BLD AUTO: 0 K/UL (ref 0–0.04)
IMM GRANULOCYTES NFR BLD AUTO: 0 % (ref 0–0.5)
LYMPHOCYTES # BLD: 1.2 K/UL (ref 0.8–3.5)
LYMPHOCYTES NFR BLD: 11 % (ref 12–49)
MCH RBC QN AUTO: 30.7 PG (ref 26–34)
MCHC RBC AUTO-ENTMCNC: 30.8 G/DL (ref 30–36.5)
MCV RBC AUTO: 99.8 FL (ref 80–99)
MONOCYTES # BLD: 1 K/UL (ref 0–1)
MONOCYTES NFR BLD: 10 % (ref 5–13)
NEUTS SEG # BLD: 8.4 K/UL (ref 1.8–8)
NEUTS SEG NFR BLD: 77 % (ref 32–75)
NRBC # BLD: 0 K/UL (ref 0–0.01)
NRBC BLD-RTO: 0 PER 100 WBC
PLATELET # BLD AUTO: 340 K/UL (ref 150–400)
PMV BLD AUTO: 10.4 FL (ref 8.9–12.9)
POTASSIUM SERPL-SCNC: 4.1 MMOL/L (ref 3.5–5.1)
PROT SERPL-MCNC: 7.1 G/DL (ref 6.4–8.2)
RBC # BLD AUTO: 4.59 M/UL (ref 4.1–5.7)
SODIUM SERPL-SCNC: 138 MMOL/L (ref 136–145)
WBC # BLD AUTO: 10.8 K/UL (ref 4.1–11.1)

## 2021-05-19 PROCEDURE — 1101F PT FALLS ASSESS-DOCD LE1/YR: CPT | Performed by: NURSE PRACTITIONER

## 2021-05-19 PROCEDURE — G8420 CALC BMI NORM PARAMETERS: HCPCS | Performed by: NURSE PRACTITIONER

## 2021-05-19 PROCEDURE — 99214 OFFICE O/P EST MOD 30 MIN: CPT | Performed by: NURSE PRACTITIONER

## 2021-05-19 PROCEDURE — G8432 DEP SCR NOT DOC, RNG: HCPCS | Performed by: NURSE PRACTITIONER

## 2021-05-19 PROCEDURE — G8536 NO DOC ELDER MAL SCRN: HCPCS | Performed by: NURSE PRACTITIONER

## 2021-05-19 PROCEDURE — G0463 HOSPITAL OUTPT CLINIC VISIT: HCPCS | Performed by: NURSE PRACTITIONER

## 2021-05-19 PROCEDURE — 3017F COLORECTAL CA SCREEN DOC REV: CPT | Performed by: NURSE PRACTITIONER

## 2021-05-19 PROCEDURE — G8427 DOCREV CUR MEDS BY ELIG CLIN: HCPCS | Performed by: NURSE PRACTITIONER

## 2021-05-19 RX ORDER — PREDNISONE 10 MG/1
TABLET ORAL
Qty: 21 TABLET | Refills: 0 | Status: SHIPPED | OUTPATIENT
Start: 2021-05-19 | End: 2021-07-13

## 2021-05-19 RX ORDER — FLUOROURACIL 50 MG/G
1 CREAM TOPICAL DAILY
COMMUNITY
Start: 2021-04-29 | End: 2021-07-13

## 2021-05-19 RX ORDER — VALACYCLOVIR HYDROCHLORIDE 1 G/1
1000 TABLET, FILM COATED ORAL 3 TIMES DAILY
Qty: 21 TABLET | Refills: 0 | Status: SHIPPED | OUTPATIENT
Start: 2021-05-19 | End: 2021-05-26

## 2021-05-19 NOTE — PROGRESS NOTES
Chief Complaint   Patient presents with    Shoulder Pain    Follow-up    Facial Pain     left jaw pain. notes left eye appears to be closed more     Complete Physical     1. Have you been to the ER, urgent care clinic since your last visit? Hospitalized since your last visit? No    2. Have you seen or consulted any other health care providers outside of the [address] [place] [address] since your last visit? Include any pap smears or colon screening.  No

## 2021-05-19 NOTE — PROGRESS NOTES
[facility] Note    Gardenia Bernard is a 66 y.o. male who was seen in clinic today (5/19/2021). Subjective:  Dementia  Patient is seen for assessment of dementia. He is accompanied by wife. Cognitive symptoms: worsened  Behavioral symptoms: not changed    Patient remains on Aricept and Lexapro. Trazodone used for sleep with effectiveness. The family and the patient identify problems with changes in short term memory, recalling words and repetition of questions. Family and patient report problems with agitation. Family and patient are concerned about  driving. Medication administration: patient self medicates    Functional Assessment:   Activities of Daily Living (ADLs):  He is independent in the following: ambulation, bathing and hygiene, feeding, continence, grooming, toileting and dressing  Requires assistance with the following: none    Patient reports new onset of left-sided facial pain. Patient reports pain that radiates into the left gums and left eye. Symptoms began about 2 weeks ago and have improved. Appointment pending with dentist.    Prior to Admission medications    Medication Sig Start Date End Date Taking? Authorizing Provider   fluorouraciL (EFUDEX) 5 % chemo cream Apply 1 Applicator to affected area daily. 4/29/21  Yes Provider, Historical   valACYclovir (VALTREX) 1 gram tablet Take 1 Tablet by mouth three (3) times daily for 7 days. 5/19/21 5/26/21 Yes Jas Luke NP   predniSONE (STERAPRED DS) 10 mg dose pack See administration instruction per 10mg dose pack 5/19/21  Yes Melania Rasheed NP   multivit-min/ferrous fumarate (MULTI VITAMIN PO) 1 Tab. Yes Provider, Historical   acetaminophen (TYLENOL) 500 mg tablet 1,000 mg. 2/26/21  Yes Provider, Historical   traZODone (DESYREL) 50 mg tablet Take 1 Tab by mouth nightly. 4/21/21  Yes Jas Luke NP   sertraline (ZOLOFT) 25 mg tablet Take 1 Tab by mouth daily.  For anxiety and depression 4/21/21  Yes Anusha Rasheed NP   donepeziL (ARICEPT) 10 mg tablet Take 1 Tab by mouth nightly. 1/4/21  Yes Anusha Rasheed NP   CHLORPHENIRAMINE/PHENYLEPHRINE (SINUS AND ALLERGY PE PO) Take  by mouth as needed. Yes Provider, Historical   aspirin delayed-release 81 mg tablet 81 mg. Patient not taking: Reported on 5/19/2021 2/26/21   Provider, Historical   POTASSIUM CHLORIDE  mg. Patient not taking: Reported on 5/19/2021    Provider, Historical   OTHER prevagen  Patient not taking: Reported on 5/19/2021    Provider, Historical   OTHER neuriva  Patient not taking: Reported on 5/19/2021    Provider, Historical   biotin 5,000 mcg TbDi Take  by mouth. Patient not taking: Reported on 5/19/2021    Provider, Historical   multivitamin (ONE A DAY) tablet Take 1 Tab by mouth daily. Patient not taking: Reported on 5/19/2021    Provider, Historical          No Known Allergies        ROS  See HPI    Objective:   Physical Exam  Vitals and nursing note reviewed. Constitutional:       Appearance: He is well-developed. Cardiovascular:      Rate and Rhythm: Normal rate and regular rhythm. Heart sounds: No murmur heard. No friction rub. No gallop. Pulmonary:      Effort: Pulmonary effort is normal. No respiratory distress. Breath sounds: Normal breath sounds. Neurological:      Mental Status: He is alert and oriented to person, place, and time. Comments: Sensory changes around left facial nerve. Mild ptosis of left eye noted. Psychiatric:         Speech: Speech normal.         Behavior: Behavior normal.         Thought Content: Thought content normal.           Visit Vitals  BP (!) 141/79 (BP 1 Location: Left upper arm, BP Patient Position: Sitting)   Pulse 80   Temp 99.7 °F (37.6 °C) (Temporal)   Resp 16   Ht 6' 1\" (1.854 m)   Wt 185 lb (83.9 kg)   SpO2 97%   BMI 24.41 kg/m²       Assessment & Plan:  Diagnoses and all orders for this visit:    1.  Mild cognitive impairment  Stable, no changes to current therapy  -     CBC WITH AUTOMATED DIFF; Future  -     METABOLIC PANEL, COMPREHENSIVE; Future    2. Insomnia due to other mental disorder  Improved with trazodone, no changes. 3. Left-sided trigeminal neuralgia  Cover with antiviral and steroid taper. MRI to evaluate further. Follow-up with neurology as scheduled. -     MRI BRAIN WO CONT; Future  -     valACYclovir (VALTREX) 1 gram tablet; Take 1 Tablet by mouth three (3) times daily for 7 days. -     predniSONE (STERAPRED DS) 10 mg dose pack; See administration instruction per 10mg dose pack    4. Obstructive sleep apnea syndrome  Follow-up with neurology for sleep apnea testing. 5. Benign prostatic hyperplasia without lower urinary tract symptoms  -     PSA W/ REFLX FREE PSA; Future      I have discussed the diagnosis with the patient and the intended plan as seen in the above orders. The patient has received an after-visit summary along with patient information handout. I have discussed medication side effects and warnings with the patient as well. Follow-up and Dispositions    · Return in about 6 months (around 11/19/2021) for disease management.            Maria D Chang, HUMA

## 2021-05-19 NOTE — PATIENT INSTRUCTIONS
Trigeminal Neuralgia: Care Instructions Your Care Instructions Trigeminal neuralgia is a problem with the large nerve that brings feeling to your face. It causes a sudden, sharp pain on one side of your face. Just touching your cheek or talking can set off shooting pain toward the ear, eye, or nostril. Living with this pain can be very hard. Some people have long periods when they do not have pain, and then it comes back. Some people have periods of pain often. But medicine or other treatment often can make the pain go away. If you keep having pain, surgery may help. This problem is also called tic douloureux (say \"tik doo-sandhya-IBRAHIMA\"). Follow-up care is a key part of your treatment and safety. Be sure to make and go to all appointments, and call your doctor if you are having problems. It's also a good idea to know your test results and keep a list of the medicines you take. How can you care for yourself at home? · Write down when you have pain and what you were doing when it started. Try to find what causes the pain. Being in a cold wind, yawning, or shaving are examples. Avoid or limit these triggers if you can. · Be safe with medicines. Take your medicines exactly as prescribed. ? Your doctor may have prescribed medicines used to treat depression and seizures. They can reduce your pain, help you sleep better, and improve your mood. ? Call your doctor if you think you are having a problem with your medicine. You will get more details on the specific medicines your doctor prescribes. ? If you are not taking a prescription pain medicine, take an over-the-counter medicine such as acetaminophen (Tylenol), ibuprofen (Advil, Motrin), or naproxen (Aleve). Read and follow all instructions on the label. ? Do not take two or more pain medicines at the same time unless the doctor told you to. Many pain medicines have acetaminophen, which is Tylenol. Too much acetaminophen (Tylenol) can be harmful.  
· Reduce stress in your life. Ask your doctor about ways to relax. These may include breathing exercises and massage. · Think about joining a support group with other people who have this problem. These groups can give comfort and information about what to do to feel better. Your doctor can tell you how to find a support group. When should you call for help? Call your doctor now or seek immediate medical care if: 
  · You have severe pain that you can't control. Watch closely for changes in your health, and be sure to contact your doctor if: 
  · You are not able to sleep because of the pain.  
  · You do not get better as expected. Where can you learn more? Go to http://www.gray.com/ Enter R378 in the search box to learn more about \"Trigeminal Neuralgia: Care Instructions. \" Current as of: February 26, 2020               Content Version: 12.8 © 0525-4980 Healthwise, Incorporated. Care instructions adapted under license by [employer] (which disclaims liability or warranty for this information). If you have questions about a medical condition or this instruction, always ask your healthcare professional. [address] any warranty or liability for your use of this information.

## 2021-05-20 LAB
% FREE PSA, 480797: 24.8 %
PSA SERPL-MCNC: 4 NG/ML (ref 0–4)
PSA, FREE, 480853: 0.99 NG/ML
REFLEX CRITERIA: NORMAL

## 2021-06-01 ENCOUNTER — HOSPITAL ENCOUNTER (OUTPATIENT)
Dept: MRI IMAGING | Age: 71
Discharge: HOME | End: 2021-06-01
Payer: MEDICARE

## 2021-06-01 DIAGNOSIS — G50.0 LEFT-SIDED TRIGEMINAL NEURALGIA: ICD-10-CM

## 2021-06-01 PROCEDURE — 70551 MRI BRAIN STEM W/O DYE: CPT

## 2021-06-02 NOTE — PROGRESS NOTES
Patient's MRI of the brain showed chronic white matter disease which is thinning of the brain associated with dementia. There is no mass or additional abnormalities noted.

## 2021-06-07 NOTE — PROGRESS NOTES
Call placed to patients wife Larna Mohs who is on [address] [place] [address]. Patients name and  verified. Reviewed recent MRI results with wife. She expressed understanding.

## 2021-06-11 ENCOUNTER — OFFICE VISIT (OUTPATIENT)
Dept: FAMILY MEDICINE CLINIC | Age: 71
End: 2021-06-11
Payer: MEDICARE

## 2021-06-11 VITALS
OXYGEN SATURATION: 97 % | HEIGHT: 73 IN | BODY MASS INDEX: 23.86 KG/M2 | SYSTOLIC BLOOD PRESSURE: 134 MMHG | DIASTOLIC BLOOD PRESSURE: 83 MMHG | TEMPERATURE: 98.3 F | RESPIRATION RATE: 16 BRPM | WEIGHT: 180 LBS | HEART RATE: 77 BPM

## 2021-06-11 DIAGNOSIS — G31.84 MILD COGNITIVE IMPAIRMENT: Primary | ICD-10-CM

## 2021-06-11 PROCEDURE — G8536 NO DOC ELDER MAL SCRN: HCPCS | Performed by: NURSE PRACTITIONER

## 2021-06-11 PROCEDURE — G8427 DOCREV CUR MEDS BY ELIG CLIN: HCPCS | Performed by: NURSE PRACTITIONER

## 2021-06-11 PROCEDURE — G0463 HOSPITAL OUTPT CLINIC VISIT: HCPCS | Performed by: NURSE PRACTITIONER

## 2021-06-11 PROCEDURE — 3017F COLORECTAL CA SCREEN DOC REV: CPT | Performed by: NURSE PRACTITIONER

## 2021-06-11 PROCEDURE — G8420 CALC BMI NORM PARAMETERS: HCPCS | Performed by: NURSE PRACTITIONER

## 2021-06-11 PROCEDURE — 99213 OFFICE O/P EST LOW 20 MIN: CPT | Performed by: NURSE PRACTITIONER

## 2021-06-11 PROCEDURE — G8432 DEP SCR NOT DOC, RNG: HCPCS | Performed by: NURSE PRACTITIONER

## 2021-06-11 PROCEDURE — 1101F PT FALLS ASSESS-DOCD LE1/YR: CPT | Performed by: NURSE PRACTITIONER

## 2021-06-11 NOTE — PATIENT INSTRUCTIONS
Alzheimer's Disease: Care Instructions Overview Alzheimer's disease is a type of dementia. It causes memory loss and affects judgment, language, and behavior. You may have trouble making decisions or may get lost in places that you used to know well. Alzheimer's disease is different than mild memory loss that occurs with aging. It's not clear what causes Alzheimer's disease. It's the most common form of dementia in older adults. Although there is no cure at this time, medicine in some cases may slow memory loss for a while. Other medicines may help with sleep, depression, or behavior changes. Alzheimer's disease is different for everyone. Some people can function well for a long time. In the early stage of the disease, you can do things at home to make life easier and safer. You also can keep doing your hobbies and other activities. Many people find comfort in planning now for their future needs. Follow-up care is a key part of your treatment and safety. Be sure to make and go to all appointments, and call your doctor if you are having problems. It's also a good idea to know your test results and keep a list of the medicines you take. How can you care for yourself at home? Taking care of yourself · If your doctor gives you medicines, take them exactly as prescribed. Call your doctor if you think you are having a problem with your medicine. You will get more details on the medicines your doctor prescribes. · Eat a balanced diet. Get plenty of whole grains, fruits, and vegetables every day. If you are not hungry at mealtimes, eat snacks at midmorning and in the afternoon. Try drinks such as Boost, Ensure, or Sustacal if you are having trouble keeping your weight up. · Stay active. Exercise such as walking may slow the decline of your mental abilities. Try to stay active mentally too. Read and work crossword puzzles if you enjoy these activities.  
· If you have trouble sleeping, do not nap during the day. Get regular exercise (but not within several hours of bedtime). Drink a glass of warm milk or caffeine-free herbal tea before going to bed. · Ask your doctor about support groups and other resources in your area. They can help people who have Alzheimer's disease and their families. · Be patient. You may find that a task takes you longer than it used to. · If you have not already done so, make a list of advance directives. Advance directives are instructions to your doctor and family members about what kind of care you want if you become unable to speak or express yourself. Talk to a  about making a will, if you do not already have one. Keeping schedules · Develop a routine. You will feel less frustrated or confused if you have a clear, simple plan of what to do every day. ? Make lists of your medicines and when to take them. ? Write down appointments and other tasks in a calendar. ? Put sticky notes around the house to help you remember events and other things you have to do. ? Schedule activities and tasks for times of the day when you are best able to handle them. Staying safe · Tell someone when you are going out and where you are going. Let the person know when you will be back. Before you go out alone, write down where you are going, how to get there, and how to get back home. Do this even if you have gone there many times before. Take someone along with you when possible. · Make your home safe. Tack down rugs, put no-slip tape in the tub, use handrails, and put safety switches on stoves and appliances. · Have a family member or other caregiver tell you whether you are driving badly. Deciding to stop driving is very hard for many people. Driving helps you feel independent. Your state 's license bureau can do a driving test if there is any question. Plan for other means of getting around when you are no longer able to drive. · Use strong lighting, especially at night.  Put night-lights in bedrooms, hallways, and bathrooms. · Lower the hot water temperature setting to 120°F or lower to avoid burns. When should you call for help? Call 911 anytime you think you may need emergency care. For example, call if: 
  · You are lost and do not know whom to call.  
  · You are injured and do not know whom to call. Call your doctor now or seek immediate medical care if: 
  · Your symptoms suddenly get much worse. Watch closely for changes in your health, and be sure to contact your doctor if: 
  · You want more information about how you can take care of yourself. Where can you learn more? Go to http://allie-natalya.info/ Enter Y179 in the search box to learn more about \"Alzheimer's Disease: Care Instructions. \" Current as of: September 23, 2020               Content Version: 12.8 © 0287-1450 Healthwise, Incorporated. Care instructions adapted under license by [employer] (which disclaims liability or warranty for this information). If you have questions about a medical condition or this instruction, always ask your healthcare professional. [address] any warranty or liability for your use of this information.

## 2021-06-11 NOTE — PROGRESS NOTES
Chief Complaint   Patient presents with    Medication Evaluation     1. Have you been to the ER, urgent care clinic since your last visit? Hospitalized since your last visit? No    2. Have you seen or consulted any other health care providers outside of the [address] [place] [address] since your last visit? Include any pap smears or colon screening.  No

## 2021-06-11 NOTE — PROGRESS NOTES
[facility] Note    Annika Arizmendi is a 66 y.o. male who was seen in clinic today (6/11/2021). Subjective:  Dementia  Patient is seen for assessment of dementia. He is accompanied by wife. Cognitive symptoms: worsened  Behavioral symptoms: not changed    Patient remains on Aricept and Lexapro. Trazodone used for sleep with effectiveness. Appointment pending with neurology. The family and the patient identify problems with changes in short term memory, recalling words and repetition of questions. Family and patient report problems with agitation. Family and patient are concerned about  driving. Medication administration: patient self medicates    Functional Assessment:   Activities of Daily Living (ADLs):  He is independent in the following: ambulation, bathing and hygiene, feeding, continence, grooming, toileting and dressing  Requires assistance with the following: none    MRI Results (most recent):  Results from Hospital Encounter encounter on 06/01/21    MRI BRAIN WO CONT    Narrative  INDICATION: Left trigeminal myalgia    EXAMINATION:  MRI BRAIN WO CONTRAST    COMPARISON:  November 14, 2017    TECHNIQUE:  Multiplanar multisequence acquisition without contrast of the brain. Noncontrast axial coronal thin slice imaging through the skull base performed. Imaging was performed on an open MRI. FINDINGS:    Ventricles:  Midline, no hydrocephalus. Brain Parenchyma/Brainstem:  Mild generalized atrophy. Mild chronic T2  hyperintensities throughout the supratentorial white matter and kirsten. No acute  infarction. Intracranial Hemorrhage:  None. Basal Cisterns:  Normal. No definite cranial nerve abnormality allowing for lack  of IV contrast.  Flow Voids:  Normal.  Additional Comments:  Heterogeneous T2 signal throughout the left maxillary,  ethmoid, and frontal sinuses.  Less significant mucosal thickening in the right  frontal, ethmoid and maxillary sinuses. Impression  1. Generalized atrophy and chronic white matter disease as above, with no acute  process. 2. No definite cranial nerve or skull base abnormality allowing for lack of IV  contrast material.  3. Paranasal sinus disease left greater than right. Prior to Admission medications    Medication Sig Start Date End Date Taking? Authorizing Provider   fluorouraciL (EFUDEX) 5 % chemo cream Apply 1 Applicator to affected area daily. 4/29/21  Yes Provider, Historical   predniSONE (STERAPRED DS) 10 mg dose pack See administration instruction per 10mg dose pack 5/19/21  Yes Priscila Rasheed NP   multivit-min/ferrous fumarate (MULTI VITAMIN PO) 1 Tab. Yes Provider, Historical   acetaminophen (TYLENOL) 500 mg tablet 1,000 mg. 2/26/21  Yes Provider, Historical   aspirin delayed-release 81 mg tablet 81 mg. 2/26/21  Yes Provider, Historical   traZODone (DESYREL) 50 mg tablet Take 1 Tab by mouth nightly. 4/21/21  Yes Anshul Robles NP   sertraline (ZOLOFT) 25 mg tablet Take 1 Tab by mouth daily. For anxiety and depression 4/21/21  Yes Priscila Rasheed NP   donepeziL (ARICEPT) 10 mg tablet Take 1 Tab by mouth nightly. 1/4/21  Yes Priscila Rasheed NP   biotin 5,000 mcg TbDi Take  by mouth. Yes Provider, Historical   multivitamin (ONE A DAY) tablet Take 1 Tablet by mouth daily. Yes Provider, Historical   CHLORPHENIRAMINE/PHENYLEPHRINE (SINUS AND ALLERGY PE PO) Take  by mouth as needed. Yes Provider, Historical   POTASSIUM CHLORIDE  mg. Patient not taking: Reported on 5/19/2021    Provider, Historical   OTHER prevagen  Patient not taking: Reported on 5/19/2021    Provider, Historical   OTHER neuriva  Patient not taking: Reported on 5/19/2021    Provider, Historical          No Known Allergies        ROS  See HPI    Objective:   Physical Exam  Vitals and nursing note reviewed. Constitutional:       Appearance: He is well-developed.    Cardiovascular:      Rate and Rhythm: Normal rate and regular rhythm. Heart sounds: No murmur heard. No friction rub. No gallop. Pulmonary:      Effort: Pulmonary effort is normal. No respiratory distress. Breath sounds: Normal breath sounds. Neurological:      Mental Status: He is alert and oriented to person, place, and time. Psychiatric:         Speech: Speech normal.         Behavior: Behavior normal.         Thought Content: Thought content normal.           Visit Vitals  /83 (BP 1 Location: Left upper arm, BP Patient Position: Sitting)   Pulse 77   Temp 98.3 °F (36.8 °C) (Temporal)   Resp 16   Ht 6' 1\" (1.854 m)   Wt 180 lb (81.6 kg)   SpO2 97%   BMI 23.75 kg/m²       Assessment & Plan:  Diagnoses and all orders for this visit:    1. Mild cognitive impairment  Stable on current medication plan. Discussed the new monoclonal antibody medication for dementia treatment, however medication is not currently available for use. Patient to follow-up with neurology as above to discuss. I have discussed the diagnosis with the patient and the intended plan as seen in the above orders. The patient has received an after-visit summary along with patient information handout. I have discussed medication side effects and warnings with the patient as well. Follow-up and Dispositions    · Return if symptoms worsen or fail to improve.            Jeferson Wolfe NP

## 2021-07-13 ENCOUNTER — OFFICE VISIT (OUTPATIENT)
Dept: FAMILY MEDICINE CLINIC | Age: 71
End: 2021-07-13
Payer: MEDICARE

## 2021-07-13 VITALS
SYSTOLIC BLOOD PRESSURE: 126 MMHG | OXYGEN SATURATION: 97 % | RESPIRATION RATE: 18 BRPM | HEIGHT: 73 IN | TEMPERATURE: 97.7 F | DIASTOLIC BLOOD PRESSURE: 73 MMHG | WEIGHT: 183 LBS | HEART RATE: 77 BPM | BODY MASS INDEX: 24.25 KG/M2

## 2021-07-13 DIAGNOSIS — G89.29 CHRONIC MIDLINE LOW BACK PAIN WITH RIGHT-SIDED SCIATICA: ICD-10-CM

## 2021-07-13 DIAGNOSIS — Z76.89 ENCOUNTER TO ESTABLISH CARE WITH NEW DOCTOR: Primary | ICD-10-CM

## 2021-07-13 DIAGNOSIS — F41.1 GAD (GENERALIZED ANXIETY DISORDER): ICD-10-CM

## 2021-07-13 DIAGNOSIS — M54.41 CHRONIC MIDLINE LOW BACK PAIN WITH RIGHT-SIDED SCIATICA: ICD-10-CM

## 2021-07-13 DIAGNOSIS — N52.9 ERECTILE DYSFUNCTION, UNSPECIFIED ERECTILE DYSFUNCTION TYPE: ICD-10-CM

## 2021-07-13 PROCEDURE — G8432 DEP SCR NOT DOC, RNG: HCPCS | Performed by: FAMILY MEDICINE

## 2021-07-13 PROCEDURE — 1101F PT FALLS ASSESS-DOCD LE1/YR: CPT | Performed by: FAMILY MEDICINE

## 2021-07-13 PROCEDURE — 3017F COLORECTAL CA SCREEN DOC REV: CPT | Performed by: FAMILY MEDICINE

## 2021-07-13 PROCEDURE — 99214 OFFICE O/P EST MOD 30 MIN: CPT | Performed by: FAMILY MEDICINE

## 2021-07-13 PROCEDURE — G0463 HOSPITAL OUTPT CLINIC VISIT: HCPCS | Performed by: FAMILY MEDICINE

## 2021-07-13 PROCEDURE — G8427 DOCREV CUR MEDS BY ELIG CLIN: HCPCS | Performed by: FAMILY MEDICINE

## 2021-07-13 PROCEDURE — G8536 NO DOC ELDER MAL SCRN: HCPCS | Performed by: FAMILY MEDICINE

## 2021-07-13 PROCEDURE — G8420 CALC BMI NORM PARAMETERS: HCPCS | Performed by: FAMILY MEDICINE

## 2021-07-13 RX ORDER — SILDENAFIL CITRATE 20 MG/1
TABLET ORAL
Qty: 20 TABLET | Refills: 0 | Status: SHIPPED | OUTPATIENT
Start: 2021-07-13 | End: 2021-10-20

## 2021-07-13 RX ORDER — BUPROPION HYDROCHLORIDE 150 MG/1
150 TABLET ORAL DAILY
Qty: 30 TABLET | Refills: 2 | Status: SHIPPED | OUTPATIENT
Start: 2021-07-13 | End: 2021-08-24 | Stop reason: SDUPTHER

## 2021-07-13 NOTE — PATIENT INSTRUCTIONS

## 2021-07-13 NOTE — PROGRESS NOTES
Patient Name: Solange Mcneil   MRN: [id number]    Stefanie Ho is a 66 y.o. male who presents with the following: Transferring care from prior PCP NP Carlo Glasgow. Here with wife. Has had some ED issues since starting sertraline for anxiety; would like to switch to another medication. Was diagnosed with MCI, now on donepezil. Doing well. Reports ongoing lower right back pain since Feb. Had a R TKR surgery done which resulted in residual sensitivity in his lower right leg. Had also noticed his back was feeling more sore. 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 Denies incontinence, fevers, weight loss. BP Readings from Last 3 Encounters:   07/13/21 126/73   06/11/21 134/83   05/19/21 (!) 141/79     Wt Readings from Last 3 Encounters:   07/13/21 183 lb (83 kg)   06/11/21 180 lb (81.6 kg)   05/19/21 185 lb (83.9 kg)     Review of Systems   Constitutional: Negative for fever, malaise/fatigue and weight loss. Respiratory: Negative for cough, hemoptysis, shortness of breath and wheezing. Cardiovascular: Negative for chest pain, palpitations, leg swelling and PND. Gastrointestinal: Negative for abdominal pain, constipation, diarrhea, nausea and vomiting. Musculoskeletal: Positive for back pain. Psychiatric/Behavioral: Negative for depression and suicidal ideas. The patient is nervous/anxious. The patient's medications, allergies, past medical history, surgical history, family history and social history were reviewed and updated where appropriate. Current Outpatient Medications:     acetaminophen (TYLENOL) 500 mg tablet, 1,000 mg., Disp: , Rfl:     traZODone (DESYREL) 50 mg tablet, Take 1 Tab by mouth nightly., Disp: 90 Tab, Rfl: 1    sertraline (ZOLOFT) 25 mg tablet, Take 1 Tab by mouth daily.  For anxiety and depression, Disp: 90 Tab, Rfl: 1    donepeziL (ARICEPT) 10 mg tablet, Take 1 Tab by mouth nightly., Disp: 90 Tab, Rfl: 1    No Known Allergies      Past Medical History:   Diagnosis Date    Allergic rhinitis, cause unspecified     Anxiety disorder 7/2/2010    AR (allergic rhinitis) 7/2/2010    Degenerative arthritis of lumbar spine 9/12    Eustachian tube dysfunction     Insomnia due to other mental disorder 5/19/2021    Mild cognitive impairment 5/19/2021    Obstructive sleep apnea syndrome 5/19/2021    Right inguinal hernia 5/3/2016       Past Surgical History:   Procedure Laterality Date    ENDOSCOPY, COLON, DIAGNOSTIC  2007    repeat 2014    HX APPENDECTOMY  1953         HX HERNIA REPAIR  2/85    HX KNEE ARTHROSCOPY  6/02    HX ORTHOPAEDIC  10/75    knee repair       Family History   Problem Relation Age of Onset    Heart Disease Mother         CAD s/p CABG 66's    Cancer Father     Alcohol abuse Father        Social History     Socioeconomic History    Marital status:      Spouse name: Not on file    Number of children: Not on file    Years of education: Not on file    Highest education level: Not on file   Occupational History    Occupation:  ed   Tobacco Use    Smoking status: Former Smoker     Years: 10.00     Types: Cigars    Smokeless tobacco: Never Used    Tobacco comment: off and on   Vaping Use    Vaping Use: Never used   Substance and Sexual Activity    Alcohol use:  Yes    Drug use: No    Sexual activity: Yes     Partners: Female   Other Topics Concern     Service No    Blood Transfusions No    Caffeine Concern No    Occupational Exposure No    Hobby Hazards No    Sleep Concern No    Stress Concern No    Weight Concern No    Special Diet No    Back Care No    Exercise Yes     Comment: prn    Bike Helmet No    Seat Belt Yes    Self-Exams Yes   Social History Narrative    Not on file     Social Determinants of Health     Financial Resource Strain:     Difficulty of Paying Living Expenses:    Food Insecurity:     Worried About Running Out of Food in the Last Year:     [address] in the Last Year:    Transportation Needs:     Lack of Transportation (Medical):  Lack of Transportation (Non-Medical):    Physical Activity:     Days of Exercise per Week:     Minutes of Exercise per Session:    Stress:     Feeling of Stress :    Social Connections:     Frequency of Communication with Friends and Family:     Frequency of Social Gatherings with Friends and Family:     Attends Church Services:     Active Member of Clubs or Organizations:     Attends Club or Organization Meetings:     Marital Status:    Intimate Partner Violence:     Fear of Current or Ex-Partner:     Emotionally Abused:     Physically Abused:     Sexually Abused:          OBJECTIVE    Visit Vitals  /73 (BP 1 Location: Left upper arm, BP Patient Position: Sitting, BP Cuff Size: Adult)   Pulse 77   Temp 97.7 °F (36.5 °C) (Temporal)   Resp 18   Ht 6' 1\" (1.854 m)   Wt 183 lb (83 kg)   SpO2 97%   BMI 24.14 kg/m²       Physical Exam  Vitals and nursing note reviewed. Constitutional:       General: He is not in acute distress. Appearance: He is not diaphoretic. Eyes:      Conjunctiva/sclera: Conjunctivae normal.      Pupils: Pupils are equal, round, and reactive to light. Cardiovascular:      Rate and Rhythm: Normal rate and regular rhythm. Heart sounds: Normal heart sounds. No murmur heard. No friction rub. No gallop. Pulmonary:      Effort: Pulmonary effort is normal. No respiratory distress. Breath sounds: Normal breath sounds. No wheezing. Musculoskeletal:      Lumbar back: Tenderness (right paraspinal lumbar muscles) present. No swelling, edema, deformity or lacerations. Normal range of motion. Skin:     General: Skin is warm and dry. Neurological:      Mental Status: He is alert. ASSESSMENT AND PLAN  Agus Bob is a 66 y.o. male who presents today for:    1. Encounter to establish care with new doctor    2.  RAI (generalized anxiety disorder)  Switch to Wellbutrin and stop Zoloft. - buPROPion XL (WELLBUTRIN XL) 150 mg tablet; Take 1 Tablet by mouth daily. Dispense: 30 Tablet; Refill: 2    3. Erectile dysfunction, unspecified erectile dysfunction type  - sildenafiL, pulmonary hypertension, (REVATIO) 20 mg tablet; Take 2 to 3 tabs (=40 mg to 60 mg) by mouth once one hour prior to sexual intercourse; max dose once a day. Dispense: 20 Tablet; Refill: 0    4. Chronic midline low back pain with right-sided sciatica  Will update lumbar x ray. Recommend home exercise; consider PT or ortho if persistent. - XR SPINE LUMB 2 OR 3 V; Future       Medications Discontinued During This Encounter   Medication Reason    aspirin delayed-release 81 mg tablet LIST CLEANUP    biotin 5,000 mcg TbDi LIST CLEANUP    CHLORPHENIRAMINE/PHENYLEPHRINE (SINUS AND ALLERGY PE PO) LIST CLEANUP    fluorouraciL (EFUDEX) 5 % chemo cream LIST CLEANUP    multivit-min/ferrous fumarate (MULTI VITAMIN PO) LIST CLEANUP    multivitamin (ONE A DAY) tablet LIST CLEANUP    OTHER LIST CLEANUP    OTHER LIST CLEANUP    POTASSIUM CHLORIDE PO LIST CLEANUP    predniSONE (STERAPRED DS) 10 mg dose pack LIST CLEANUP    sertraline (ZOLOFT) 25 mg tablet LIST CLEANUP     Follow-up and Dispositions    · Return in about 6 weeks (around 8/24/2021) for Medication Check. Treatment risks/benefits/costs/interactions/alternatives discussed with patient. Advised patient to call back or return to office if symptoms worsen/change/persist. If patient cannot reach us or should anything more severe/urgent arise he/she should proceed directly to the nearest emergency department. Discussed expected course/resolution/complications of diagnosis in detail with patient. Patient expressed understanding with the diagnosis and plan. Joseluis Brito M.D.

## 2021-07-13 NOTE — PROGRESS NOTES
Chief Complaint   Patient presents with    Hypertension     Blood pressure check     1. Have you been to the ER, urgent care clinic since your last visit? Hospitalized since your last visit? No    2. Have you seen or consulted any other health care providers outside of the [address] [place] [address] since your last visit? Include any pap smears or colon screening.  No

## 2021-07-14 ENCOUNTER — TELEPHONE (OUTPATIENT)
Dept: FAMILY MEDICINE CLINIC | Age: 71
End: 2021-07-14

## 2021-07-14 DIAGNOSIS — Z01.83 ENCOUNTER FOR BLOOD TYPING: Primary | ICD-10-CM

## 2021-07-14 NOTE — TELEPHONE ENCOUNTER
Pt's wife called requesting to get her and her 's blood type due to them receiving a new grandchild, they stated that their daughter in [address] [facility] was asking for their blood type     I advised patient that we might not have it in their chart just because we dont check blood type unless it is specifically requested but I told her I would send a message back for the nurse to check and give a call back    BCB# [phone]

## 2021-07-15 DIAGNOSIS — G31.84 MILD COGNITIVE IMPAIRMENT: ICD-10-CM

## 2021-07-15 RX ORDER — DONEPEZIL HYDROCHLORIDE 10 MG/1
10 TABLET, FILM COATED ORAL
Qty: 90 TABLET | Refills: 1 | Status: SHIPPED | OUTPATIENT
Start: 2021-07-15 | End: 2022-01-12

## 2021-07-15 NOTE — TELEPHONE ENCOUNTER
Advised that we don't have bld type on file. Advised that we could do it here but insurance might not pay for it. Recommended that they go to [facility] they will get it for free and will be helping someone. She states that is what they will do.

## 2021-07-15 NOTE — TELEPHONE ENCOUNTER
Patient's wife call requesting refill      . Requested Prescriptions     Pending Prescriptions Disp Refills    donepeziL (ARICEPT) 10 mg tablet 90 Tablet 1     Sig: Take 1 Tablet by mouth nightly.      Best call back #[phone]

## 2021-07-15 NOTE — TELEPHONE ENCOUNTER
Last visit 07/13/2021 MD Thom Smith   Next appointment 08/24/2021 MD Thom Smith   Previous refill encounter(s)   01/04/2021 Aricept #90 with 1 refill     Requested Prescriptions     Pending Prescriptions Disp Refills    donepeziL (ARICEPT) 10 mg tablet 90 Tablet 1     Sig: Take 1 Tablet by mouth nightly.

## 2021-08-24 ENCOUNTER — OFFICE VISIT (OUTPATIENT)
Dept: FAMILY MEDICINE CLINIC | Age: 71
End: 2021-08-24
Payer: MEDICARE

## 2021-08-24 VITALS
BODY MASS INDEX: 23.78 KG/M2 | WEIGHT: 179.4 LBS | SYSTOLIC BLOOD PRESSURE: 122 MMHG | RESPIRATION RATE: 18 BRPM | HEART RATE: 75 BPM | OXYGEN SATURATION: 96 % | HEIGHT: 73 IN | DIASTOLIC BLOOD PRESSURE: 68 MMHG | TEMPERATURE: 98 F

## 2021-08-24 DIAGNOSIS — R63.4 WEIGHT LOSS: Primary | ICD-10-CM

## 2021-08-24 DIAGNOSIS — F41.1 GAD (GENERALIZED ANXIETY DISORDER): ICD-10-CM

## 2021-08-24 DIAGNOSIS — Z12.5 PROSTATE CANCER SCREENING: ICD-10-CM

## 2021-08-24 LAB
ALBUMIN SERPL-MCNC: 4.1 G/DL (ref 3.5–5)
ALBUMIN/GLOB SERPL: 1.3 {RATIO} (ref 1.1–2.2)
ALP SERPL-CCNC: 94 U/L (ref 45–117)
ALT SERPL-CCNC: 21 U/L (ref 12–78)
ANION GAP SERPL CALC-SCNC: 3 MMOL/L (ref 5–15)
AST SERPL-CCNC: 14 U/L (ref 15–37)
BASOPHILS # BLD: 0.1 K/UL (ref 0–0.1)
BASOPHILS NFR BLD: 1 % (ref 0–1)
BILIRUB SERPL-MCNC: 0.9 MG/DL (ref 0.2–1)
BUN SERPL-MCNC: 15 MG/DL (ref 6–20)
BUN/CREAT SERPL: 15 (ref 12–20)
CALCIUM SERPL-MCNC: 9.3 MG/DL (ref 8.5–10.1)
CHLORIDE SERPL-SCNC: 107 MMOL/L (ref 97–108)
CO2 SERPL-SCNC: 28 MMOL/L (ref 21–32)
COMMENT, HOLDF: NORMAL
CREAT SERPL-MCNC: 0.97 MG/DL (ref 0.7–1.3)
DIFFERENTIAL METHOD BLD: ABNORMAL
EOSINOPHIL # BLD: 0.1 K/UL (ref 0–0.4)
EOSINOPHIL NFR BLD: 1 % (ref 0–7)
ERYTHROCYTE [DISTWIDTH] IN BLOOD BY AUTOMATED COUNT: 15.1 % (ref 11.5–14.5)
ERYTHROCYTE [SEDIMENTATION RATE] IN BLOOD: 2 MM/HR (ref 0–20)
GLOBULIN SER CALC-MCNC: 3.1 G/DL (ref 2–4)
GLUCOSE SERPL-MCNC: 92 MG/DL (ref 65–100)
HCT VFR BLD AUTO: 48.7 % (ref 36.6–50.3)
HGB BLD-MCNC: 15 G/DL (ref 12.1–17)
IMM GRANULOCYTES # BLD AUTO: 0 K/UL (ref 0–0.04)
IMM GRANULOCYTES NFR BLD AUTO: 0 % (ref 0–0.5)
LYMPHOCYTES # BLD: 1.4 K/UL (ref 0.8–3.5)
LYMPHOCYTES NFR BLD: 20 % (ref 12–49)
MCH RBC QN AUTO: 30.6 PG (ref 26–34)
MCHC RBC AUTO-ENTMCNC: 30.8 G/DL (ref 30–36.5)
MCV RBC AUTO: 99.4 FL (ref 80–99)
MONOCYTES # BLD: 0.6 K/UL (ref 0–1)
MONOCYTES NFR BLD: 9 % (ref 5–13)
NEUTS SEG # BLD: 4.6 K/UL (ref 1.8–8)
NEUTS SEG NFR BLD: 69 % (ref 32–75)
NRBC # BLD: 0 K/UL (ref 0–0.01)
NRBC BLD-RTO: 0 PER 100 WBC
PLATELET # BLD AUTO: 267 K/UL (ref 150–400)
PMV BLD AUTO: 10.3 FL (ref 8.9–12.9)
POTASSIUM SERPL-SCNC: 4.2 MMOL/L (ref 3.5–5.1)
PROT SERPL-MCNC: 7.2 G/DL (ref 6.4–8.2)
PSA SERPL-MCNC: 3.8 NG/ML (ref 0.01–4)
RBC # BLD AUTO: 4.9 M/UL (ref 4.1–5.7)
SAMPLES BEING HELD,HOLD: NORMAL
SODIUM SERPL-SCNC: 138 MMOL/L (ref 136–145)
T4 SERPL-MCNC: 7.6 UG/DL (ref 4.5–12.1)
TSH SERPL DL<=0.05 MIU/L-ACNC: 1.42 UIU/ML (ref 0.36–3.74)
WBC # BLD AUTO: 6.7 K/UL (ref 4.1–11.1)

## 2021-08-24 PROCEDURE — G8510 SCR DEP NEG, NO PLAN REQD: HCPCS | Performed by: FAMILY MEDICINE

## 2021-08-24 PROCEDURE — 3017F COLORECTAL CA SCREEN DOC REV: CPT | Performed by: FAMILY MEDICINE

## 2021-08-24 PROCEDURE — 99214 OFFICE O/P EST MOD 30 MIN: CPT | Performed by: FAMILY MEDICINE

## 2021-08-24 PROCEDURE — 1101F PT FALLS ASSESS-DOCD LE1/YR: CPT | Performed by: FAMILY MEDICINE

## 2021-08-24 PROCEDURE — G8420 CALC BMI NORM PARAMETERS: HCPCS | Performed by: FAMILY MEDICINE

## 2021-08-24 PROCEDURE — G0463 HOSPITAL OUTPT CLINIC VISIT: HCPCS | Performed by: FAMILY MEDICINE

## 2021-08-24 PROCEDURE — G8536 NO DOC ELDER MAL SCRN: HCPCS | Performed by: FAMILY MEDICINE

## 2021-08-24 PROCEDURE — G8427 DOCREV CUR MEDS BY ELIG CLIN: HCPCS | Performed by: FAMILY MEDICINE

## 2021-08-24 RX ORDER — BUPROPION HYDROCHLORIDE 150 MG/1
150 TABLET ORAL DAILY
Qty: 90 TABLET | Refills: 2 | Status: SHIPPED | OUTPATIENT
Start: 2021-08-24 | End: 2022-02-09

## 2021-08-24 NOTE — PROGRESS NOTES
Patient Name: Esau Lugo   MRN: [id number]    Elizabeth Ayers is a 66 y.o. male who presents with the following:     Doing well on Wellbutrin; no side effects of ED. Has lost about 4 lbs since last visit. Reports 10 lbs weight loss since this year. Admits to less appetite. Has a chronic issue of going to the bathroom for BM soon after eating. Still has gallbladder. Hx of smoking. Quit 8 years ago. Smoked 1 ppd x 20 years. Is UTD with colonoscopy. BP Readings from Last 3 Encounters:   08/24/21 122/68   07/13/21 126/73   06/11/21 134/83     Wt Readings from Last 3 Encounters:   08/24/21 179 lb 6.4 oz (81.4 kg)   07/13/21 183 lb (83 kg)   06/11/21 180 lb (81.6 kg)     Review of Systems   Constitutional: Positive for weight loss. Negative for fever and malaise/fatigue. Respiratory: Negative for cough, hemoptysis, shortness of breath and wheezing. Cardiovascular: Negative for chest pain, palpitations, leg swelling and PND. Gastrointestinal: Negative for abdominal pain, constipation, diarrhea, nausea and vomiting. The patient's medications, allergies, past medical history, surgical history, family history and social history were reviewed and updated where appropriate. Current Outpatient Medications:     donepeziL (ARICEPT) 10 mg tablet, Take 1 Tablet by mouth nightly., Disp: 90 Tablet, Rfl: 1    buPROPion XL (WELLBUTRIN XL) 150 mg tablet, Take 1 Tablet by mouth daily. , Disp: 30 Tablet, Rfl: 2    traZODone (DESYREL) 50 mg tablet, Take 1 Tab by mouth nightly., Disp: 90 Tab, Rfl: 1    sildenafiL, pulmonary hypertension, (REVATIO) 20 mg tablet, Take 2 to 3 tabs (=40 mg to 60 mg) by mouth once one hour prior to sexual intercourse; max dose once a day.  (Patient not taking: Reported on 8/24/2021), Disp: 20 Tablet, Rfl: 0    No Known Allergies      OBJECTIVE    Visit Vitals  /68 (BP 1 Location: Left upper arm, BP Patient Position: Sitting, BP Cuff Size: Adult)   Pulse 75   Temp 98 °F (36.7 °C) (Temporal)   Resp 18   Ht 6' 1\" (1.854 m)   Wt 179 lb 6.4 oz (81.4 kg)   SpO2 96%   BMI 23.67 kg/m²       Physical Exam  Constitutional:       General: He is not in acute distress. Appearance: He is not diaphoretic. HENT:      Head: Normocephalic. Right Ear: External ear normal.      Left Ear: External ear normal.   Eyes:      Conjunctiva/sclera: Conjunctivae normal.      Pupils: Pupils are equal, round, and reactive to light. Cardiovascular:      Rate and Rhythm: Normal rate and regular rhythm. Heart sounds: Normal heart sounds. No murmur heard. No friction rub. No gallop. Pulmonary:      Effort: Pulmonary effort is normal. No respiratory distress. Breath sounds: Normal breath sounds. No wheezing or rales. Abdominal:      General: Bowel sounds are normal. There is no distension. Palpations: Abdomen is soft. Tenderness: There is no abdominal tenderness. There is no guarding or rebound. Skin:     General: Skin is warm and dry. Neurological:      Mental Status: He is alert and oriented to person, place, and time. Psychiatric:         Mood and Affect: Affect normal.         Cognition and Memory: Memory normal.         Judgment: Judgment normal.           ASSESSMENT AND PLAN  Ric Worthy is a 66 y.o. male who presents today for:    1. Weight loss  Will obtain labs. Consider CT abd/pelvis and CT lung if lab work is normal.  - CBC WITH AUTOMATED DIFF; Future  - METABOLIC PANEL, COMPREHENSIVE; Future  - TSH 3RD GENERATION; Future  - T4 (THYROXINE); Future  - PSA, DIAGNOSTIC (PROSTATE SPECIFIC AG); Future  - SED RATE (ESR); Future  - SED RATE (ESR)  - PSA, DIAGNOSTIC (PROSTATE SPECIFIC AG)  - T4 (THYROXINE)  - TSH 3RD GENERATION  - METABOLIC PANEL, COMPREHENSIVE  - CBC WITH AUTOMATED DIFF    2. Prostate cancer screening  - PSA, DIAGNOSTIC (PROSTATE SPECIFIC AG); Future  - PSA, DIAGNOSTIC (PROSTATE SPECIFIC AG)    3.  RAI (generalized anxiety disorder)  Stable, continue current treatment. - buPROPion XL (WELLBUTRIN XL) 150 mg tablet; Take 1 Tablet by mouth daily. Dispense: 90 Tablet; Refill: 2       Medications Discontinued During This Encounter   Medication Reason    acetaminophen (TYLENOL) 500 mg tablet LIST CLEANUP    buPROPion XL (WELLBUTRIN XL) 150 mg tablet REORDER           Treatment risks/benefits/costs/interactions/alternatives discussed with patient. Advised patient to call back or return to office if symptoms worsen/change/persist. If patient cannot reach us or should anything more severe/urgent arise he/she should proceed directly to the nearest emergency department. Discussed expected course/resolution/complications of diagnosis in detail with patient. Patient expressed understanding with the diagnosis and plan. Joseluis Cervantes M.D.

## 2021-08-24 NOTE — PROGRESS NOTES
Identified pt with two pt identifiers(name and ). Reviewed record in preparation for visit and have obtained necessary documentation. Chief Complaint   Patient presents with    Medication Evaluation     6 week         Vitals:    21 1041   Weight: 179 lb 6.4 oz (81.4 kg)   Height: 6' 1\" (1.854 m)   PainSc:   0 - No pain       Health Maintenance Due   Topic    DTaP/Tdap/Td series (2 - Td or Tdap)       Coordination of Care Questionnaire:  :   1) Have you been to an emergency room, urgent care, or hospitalized since your last visit? If yes, where when, and reason for visit? no       2. Have seen or consulted any other health care provider since your last visit? If yes, where when, and reason for visit? NO      Patient is accompanied by wife I have received verbal consent from Marylen Linker to discuss any/all medical information while they are present in the room.

## 2021-08-26 DIAGNOSIS — R63.4 WEIGHT LOSS: Primary | ICD-10-CM

## 2021-08-26 DIAGNOSIS — Z87.891 PERSONAL HISTORY OF NICOTINE DEPENDENCE: ICD-10-CM

## 2021-08-26 NOTE — PROGRESS NOTES
Called, spoke to pt. Two pt identifiers confirmed. Writer has informed patient of lab results and new order for weight loss. Phone number given for Central Schedule. Pt verbalized understanding of information discussed w/ no further questions at this time.

## 2021-09-09 ENCOUNTER — HOSPITAL ENCOUNTER (OUTPATIENT)
Dept: GENERAL RADIOLOGY | Age: 71
Discharge: HOME | End: 2021-09-09
Attending: FAMILY MEDICINE
Payer: MEDICARE

## 2021-09-09 ENCOUNTER — HOSPITAL ENCOUNTER (EMERGENCY)
Age: 71
Discharge: STILL ADMITTED OR ADMINISTRATIVE | End: 2021-09-09

## 2021-09-09 ENCOUNTER — HOSPITAL ENCOUNTER (OUTPATIENT)
Dept: CT IMAGING | Age: 71
Discharge: HOME | End: 2021-09-09
Attending: FAMILY MEDICINE
Payer: MEDICARE

## 2021-09-09 DIAGNOSIS — R63.4 WEIGHT LOSS: ICD-10-CM

## 2021-09-09 DIAGNOSIS — Z87.891 PERSONAL HISTORY OF NICOTINE DEPENDENCE: ICD-10-CM

## 2021-09-09 PROCEDURE — 74011000636 HC RX REV CODE- 636: Performed by: SPECIALIST

## 2021-09-09 PROCEDURE — 74177 CT ABD & PELVIS W/CONTRAST: CPT

## 2021-09-09 PROCEDURE — 71046 X-RAY EXAM CHEST 2 VIEWS: CPT

## 2021-09-09 RX ADMIN — IOPAMIDOL 100 ML: 755 INJECTION, SOLUTION INTRAVENOUS at 18:00

## 2021-09-14 ENCOUNTER — TELEPHONE (OUTPATIENT)
Dept: FAMILY MEDICINE CLINIC | Age: 71
End: 2021-09-14

## 2021-09-14 DIAGNOSIS — R35.1 BENIGN PROSTATIC HYPERPLASIA WITH NOCTURIA: Primary | ICD-10-CM

## 2021-09-14 DIAGNOSIS — K40.90 INGUINAL HERNIA, RIGHT: ICD-10-CM

## 2021-09-14 DIAGNOSIS — N40.1 BENIGN PROSTATIC HYPERPLASIA WITH NOCTURIA: Primary | ICD-10-CM

## 2021-09-14 RX ORDER — TAMSULOSIN HYDROCHLORIDE 0.4 MG/1
0.4 CAPSULE ORAL DAILY
Qty: 90 CAPSULE | Refills: 2 | Status: SHIPPED | OUTPATIENT
Start: 2021-09-14 | End: 2022-06-09

## 2021-09-14 NOTE — TELEPHONE ENCOUNTER
Reviewed CT results. Notable for enlarged prostate and right inguinal hernia. Pt states he does urinate 3 times per night; has not tried medications for this. Hx of right inguinal hernia repair in 1984. has noticed that it does bulge out more. Start Flomax for BPH. Refer to general surgery re: hernia. Pt to continue to monitor weight at home.

## 2021-09-27 ENCOUNTER — OFFICE VISIT (OUTPATIENT)
Dept: SURGERY | Age: 71
End: 2021-09-27
Payer: MEDICARE

## 2021-09-27 VITALS
HEART RATE: 85 BPM | HEIGHT: 73 IN | WEIGHT: 183.4 LBS | SYSTOLIC BLOOD PRESSURE: 138 MMHG | RESPIRATION RATE: 18 BRPM | BODY MASS INDEX: 24.31 KG/M2 | TEMPERATURE: 98.1 F | OXYGEN SATURATION: 97 % | DIASTOLIC BLOOD PRESSURE: 76 MMHG

## 2021-09-27 DIAGNOSIS — K40.91 RECURRENT RIGHT INGUINAL HERNIA: Primary | ICD-10-CM

## 2021-09-27 PROCEDURE — 99203 OFFICE O/P NEW LOW 30 MIN: CPT | Performed by: SURGERY

## 2021-09-27 PROCEDURE — G8420 CALC BMI NORM PARAMETERS: HCPCS | Performed by: SURGERY

## 2021-09-27 PROCEDURE — G8536 NO DOC ELDER MAL SCRN: HCPCS | Performed by: SURGERY

## 2021-09-27 PROCEDURE — 3017F COLORECTAL CA SCREEN DOC REV: CPT | Performed by: SURGERY

## 2021-09-27 PROCEDURE — G8510 SCR DEP NEG, NO PLAN REQD: HCPCS | Performed by: SURGERY

## 2021-09-27 PROCEDURE — G8427 DOCREV CUR MEDS BY ELIG CLIN: HCPCS | Performed by: SURGERY

## 2021-09-27 PROCEDURE — 1101F PT FALLS ASSESS-DOCD LE1/YR: CPT | Performed by: SURGERY

## 2021-09-27 NOTE — PROGRESS NOTES
1. Have you been to the ER, urgent care clinic since your last visit? Hospitalized since your last visit? No    2. Have you seen or consulted any other health care providers outside of the [address] [place] [address] since your last visit? Include any pap smears or colon screening.  No

## 2021-09-27 NOTE — LETTER
9/28/2021    Patient: Baylee Be   YOB: 1950   Date of Visit: 9/27/2021     Andres Alvarez MD  [address]    Dear Andres Alvarez MD,      Thank you for referring Mr. Jil Dean to [facility] for evaluation. My notes for this consultation are attached. If you have questions, please do not hesitate to call me. I look forward to following your patient along with you.       Sincerely,    Fouzia Mclain MD

## 2021-09-28 NOTE — PROGRESS NOTES
[facility] History and Physical    History of Present Illness:      Emory Lewis is a 66 y.o. male who has a right inguinal hernia. He has had this hernia for a few years. He does not have any significant pain from the hernia but occasionally has some mild discomfort a 1 out of 10. He has noticed a bulge in the groin that is getting a little bit bigger. He has been having normal bowel movements, no nausea or vomiting. No changes in urinary habits. Of note he had a previous right inguinal hernia repair in 1985. 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 , Disp: 90 Capsule, Rfl: 2    buPROPion XL (WELLBUTRIN XL) 150 mg tablet, Take 1 Tablet by mouth daily. , Disp: 90 Tablet, Rfl: 2    traZODone (DESYREL) 50 mg tablet, Take 1 Tab by mouth nightly., Disp: 90 Tab, Rfl: 1    donepeziL (ARICEPT) 10 mg tablet, Take 1 Tablet by mouth nightly. (Patient not taking: Reported on 9/27/2021), Disp: 90 Tablet, Rfl: 1    sildenafiL, pulmonary hypertension, (REVATIO) 20 mg tablet, Take 2 to 3 tabs (=40 mg to 60 mg) by mouth once one hour prior to sexual intercourse; max dose once a day.  (Patient not taking: Reported on 8/24/2021), Disp: 20 Tablet, Rfl: 0    No Known Allergies    Social History     Socioeconomic History    Marital status:      Spouse name: Not on file    Number of children: Not on file    Years of education: Not on file    Highest education level: Not on file   Occupational History    Occupation:  ed   Tobacco Use    Smoking status: Former Smoker     Years: 10.00     Types: Cigars    Smokeless tobacco: Never Used    Tobacco comment: off and on   Vaping Use    Vaping Use: Never used   Substance and Sexual Activity    Alcohol use: Yes    Drug use: No    Sexual activity: Yes     Partners: Female   Other Topics Concern     Service No    Blood Transfusions No    Caffeine Concern No    Occupational Exposure No    Hobby Hazards No    Sleep Concern No    Stress Concern No    Weight Concern No    Special Diet No    Back Care No    Exercise Yes     Comment: prn    Bike Helmet No    Seat Belt Yes    Self-Exams Yes   Social History Narrative    Not on file     Social Determinants of Health     Financial Resource Strain:     Difficulty of Paying Living Expenses:    Food Insecurity:     Worried About Running Out of Food in the Last Year:     Ran Out of Food in the Last Year:    Transportation Needs:     Lack of Transportation (Medical):      Lack of Transportation (Non-Medical):    Physical Activity:     Days of Exercise per Week:     Minutes of Exercise per Session:    Stress:     Feeling of Stress :    Social Connections:     Frequency of Communication with Friends and Family:     Frequency of Social Gatherings with Friends and Family:     Attends Ep[employer] Services:     Active Member of Clubs or Organizations:     Attends Club or Organization Meetings:     Marital Status:    Intimate Partner Violence:     Fear of Current or Ex-Partner:     Emotionally Abused:     Physically Abused:     Sexually Abused:        Family History   Problem Relation Age of Onset    Heart Disease Mother CAD s/p CABG 66's    Cancer Father     Alcohol abuse Father        ROS   Constitutional: negative  Ears, Nose, Mouth, Throat, and Face: negative  Respiratory: negative  Cardiovascular: negative  Gastrointestinal: Right groin bulge  Genitourinary:negative  Integument/Breast: negative  Hematologic/Lymphatic: negative  Behavioral/Psychiatric: negative  Allergic/Immunologic: negative      Physical Exam:     Visit Vitals  /76 (BP 1 Location: Left arm, BP Patient Position: Sitting, BP Cuff Size: Adult)   Pulse 85   Temp 98.1 °F (36.7 °C) (Oral)   Resp 18   Ht 6' 1\" (1.854 m)   Wt 183 lb 6.4 oz (83.2 kg)   SpO2 97%   BMI 24.20 kg/m²       General - alert and oriented, no apparent distress  HEENT - no jaundice, no hearing imparement  Pulm - CTAB, no C/W/R  CV - RRR, no M/R/G  Abd -soft, nondistended, bowel sounds present, nontender to palpation, right inguinal hernia present that is soft and reducible, nontender to palpation  Ext - pulses intact in UE and LE bilaterally, no edema  Skin - supple, no rashes  Psychiatric - normal affect, good mood    Labs  Lab Results   Component Value Date/Time    Sodium 138 08/24/2021 11:16 AM    Potassium 4.2 08/24/2021 11:16 AM    Chloride 107 08/24/2021 11:16 AM    CO2 28 08/24/2021 11:16 AM    Anion gap 3 (L) 08/24/2021 11:16 AM    Glucose 92 08/24/2021 11:16 AM    BUN 15 08/24/2021 11:16 AM    Creatinine 0.97 08/24/2021 11:16 AM    BUN/Creatinine ratio 15 08/24/2021 11:16 AM    GFR est AA >60 08/24/2021 11:16 AM    GFR est non-AA >60 08/24/2021 11:16 AM    Calcium 9.3 08/24/2021 11:16 AM    Bilirubin, total 0.9 08/24/2021 11:16 AM    Alk.  phosphatase 94 08/24/2021 11:16 AM    Protein, total 7.2 08/24/2021 11:16 AM    Albumin 4.1 08/24/2021 11:16 AM    Globulin 3.1 08/24/2021 11:16 AM    A-G Ratio 1.3 08/24/2021 11:16 AM    ALT (SGPT) 21 08/24/2021 11:16 AM    AST (SGOT) 14 (L) 08/24/2021 11:16 AM     Lab Results   Component Value Date/Time    WBC 6.7 08/24/2021 11:16 AM HGB 15.0 08/24/2021 11:16 AM    HCT 48.7 08/24/2021 11:16 AM    PLATELET 738 75/00/4915 11:16 AM    MCV 99.4 (H) 08/24/2021 11:16 AM         Imaging  none  I have reviewed and agree with all of the pertinent images    Assessment:     Alessandra Torrez is a 66 y.o. male with right inguinal hernia    Recommendations:     1. The patient does have a right inguinal hernia. It does appear to be likely recurrent since it was attempted to be repaired in 80. At this point he is not quite sure if he wants to have surgery. If he does call back to schedule surgery I would schedule him for a robotic right inguinal hernia repair with mesh. He will call back to see if he wants to schedule surgery. Juanito Bray MD    Greater than half of the time: 30 minutes was used in counciling the patient about diagnosis and treatment plan    Mr. Tressa Cohn has a reminder for a \"due or due soon\" health maintenance. I have asked that he contact his primary care provider for follow-up on this health maintenance.

## 2021-09-28 NOTE — H&P (VIEW-ONLY)
[facility] at [facility] History and Physical    History of Present Illness:      Alicia Morgan is a 66 y.o. male who has a right inguinal hernia. He has had this hernia for a few years. He does not have any significant pain from the hernia but occasionally has some mild discomfort a 1 out of 10. He has noticed a bulge in the groin that is getting a little bit bigger. He has been having normal bowel movements, no nausea or vomiting. No changes in urinary habits. Of note he had a previous right inguinal hernia repair in 1985. 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 , Disp: 90 Capsule, Rfl: 2    buPROPion XL (WELLBUTRIN XL) 150 mg tablet, Take 1 Tablet by mouth daily. , Disp: 90 Tablet, Rfl: 2    traZODone (DESYREL) 50 mg tablet, Take 1 Tab by mouth nightly., Disp: 90 Tab, Rfl: 1    donepeziL (ARICEPT) 10 mg tablet, Take 1 Tablet by mouth nightly. (Patient not taking: Reported on 9/27/2021), Disp: 90 Tablet, Rfl: 1    sildenafiL, pulmonary hypertension, (REVATIO) 20 mg tablet, Take 2 to 3 tabs (=40 mg to 60 mg) by mouth once one hour prior to sexual intercourse; max dose once a day.  (Patient not taking: Reported on 8/24/2021), Disp: 20 Tablet, Rfl: 0    No Known Allergies    Social History     Socioeconomic History    Marital status:      Spouse name: Not on file    Number of children: Not on file    Years of education: Not on file    Highest education level: Not on file   Occupational History    Occupation:  ed   Tobacco Use    Smoking status: Former Smoker     Years: 10.00     Types: Cigars    Smokeless tobacco: Never Used    Tobacco comment: off and on   Vaping Use    Vaping Use: Never used   Substance and Sexual Activity    Alcohol use: Yes    Drug use: No    Sexual activity: Yes     Partners: Female   Other Topics Concern     Service No    Blood Transfusions No    Caffeine Concern No    Occupational Exposure No    Hobby Hazards No    Sleep Concern No    Stress Concern No    Weight Concern No    Special Diet No    Back Care No    Exercise Yes     Comment: prn    Bike Helmet No    Seat Belt Yes    Self-Exams Yes   Social History Narrative    Not on file     Social Determinants of Health     Financial Resource Strain:     Difficulty of Paying Living Expenses:    Food Insecurity:     Worried About Running Out of Food in the Last Year:     Ran Out of Food in the Last Year:    Transportation Needs:     Lack of Transportation (Medical):      Lack of Transportation (Non-Medical):    Physical Activity:     Days of Exercise per Week:     Minutes of Exercise per Session:    Stress:     Feeling of Stress :    Social Connections:     Frequency of Communication with Friends and Family:     Frequency of Social Gatherings with Friends and Family:     Attends Mor[employer] Services:     Active Member of Clubs or Organizations:     Attends Club or Organization Meetings:     Marital Status:    Intimate Partner Violence:     Fear of Current or Ex-Partner:     Emotionally Abused:     Physically Abused:     Sexually Abused:        Family History   Problem Relation Age of Onset    Heart Disease Mother CAD s/p CABG 66's    Cancer Father     Alcohol abuse Father        ROS   Constitutional: negative  Ears, Nose, Mouth, Throat, and Face: negative  Respiratory: negative  Cardiovascular: negative  Gastrointestinal: Right groin bulge  Genitourinary:negative  Integument/Breast: negative  Hematologic/Lymphatic: negative  Behavioral/Psychiatric: negative  Allergic/Immunologic: negative      Physical Exam:     Visit Vitals  /76 (BP 1 Location: Left arm, BP Patient Position: Sitting, BP Cuff Size: Adult)   Pulse 85   Temp 98.1 °F (36.7 °C) (Oral)   Resp 18   Ht 6' 1\" (1.854 m)   Wt 183 lb 6.4 oz (83.2 kg)   SpO2 97%   BMI 24.20 kg/m²       General - alert and oriented, no apparent distress  HEENT - no jaundice, no hearing imparement  Pulm - CTAB, no C/W/R  CV - RRR, no M/R/G  Abd -soft, nondistended, bowel sounds present, nontender to palpation, right inguinal hernia present that is soft and reducible, nontender to palpation  Ext - pulses intact in UE and LE bilaterally, no edema  Skin - supple, no rashes  Psychiatric - normal affect, good mood    Labs  Lab Results   Component Value Date/Time    Sodium 138 08/24/2021 11:16 AM    Potassium 4.2 08/24/2021 11:16 AM    Chloride 107 08/24/2021 11:16 AM    CO2 28 08/24/2021 11:16 AM    Anion gap 3 (L) 08/24/2021 11:16 AM    Glucose 92 08/24/2021 11:16 AM    BUN 15 08/24/2021 11:16 AM    Creatinine 0.97 08/24/2021 11:16 AM    BUN/Creatinine ratio 15 08/24/2021 11:16 AM    GFR est AA >60 08/24/2021 11:16 AM    GFR est non-AA >60 08/24/2021 11:16 AM    Calcium 9.3 08/24/2021 11:16 AM    Bilirubin, total 0.9 08/24/2021 11:16 AM    Alk.  phosphatase 94 08/24/2021 11:16 AM    Protein, total 7.2 08/24/2021 11:16 AM    Albumin 4.1 08/24/2021 11:16 AM    Globulin 3.1 08/24/2021 11:16 AM    A-G Ratio 1.3 08/24/2021 11:16 AM    ALT (SGPT) 21 08/24/2021 11:16 AM    AST (SGOT) 14 (L) 08/24/2021 11:16 AM     Lab Results   Component Value Date/Time    WBC 6.7 08/24/2021 11:16 AM HGB 15.0 08/24/2021 11:16 AM    HCT 48.7 08/24/2021 11:16 AM    PLATELET 121 74/50/2699 11:16 AM    MCV 99.4 (H) 08/24/2021 11:16 AM         Imaging  none  I have reviewed and agree with all of the pertinent images    Assessment:     Liz Mcginnis is a 66 y.o. male with right inguinal hernia    Recommendations:     1. The patient does have a right inguinal hernia. It does appear to be likely recurrent since it was attempted to be repaired in 80. At this point he is not quite sure if he wants to have surgery. If he does call back to schedule surgery I would schedule him for a robotic right inguinal hernia repair with mesh. He will call back to see if he wants to schedule surgery. Gray Crespo MD    Greater than half of the time: 30 minutes was used in counciling the patient about diagnosis and treatment plan    Mr. Lyle Hoffman has a reminder for a \"due or due soon\" health maintenance. I have asked that he contact his primary care provider for follow-up on this health maintenance.

## 2021-10-11 DIAGNOSIS — F99 INSOMNIA DUE TO OTHER MENTAL DISORDER: ICD-10-CM

## 2021-10-11 DIAGNOSIS — F51.05 INSOMNIA DUE TO OTHER MENTAL DISORDER: ICD-10-CM

## 2021-10-11 NOTE — TELEPHONE ENCOUNTER
Last Visit: 8/24/21 MD David Escobar  Next Appointment: Not scheduled  Previous Refill Encounter(s): 4/21/21 90 + 1    Requested Prescriptions     Pending Prescriptions Disp Refills    traZODone (DESYREL) 50 mg tablet 90 Tablet 1     Sig: Take 1 Tablet by mouth nightly.

## 2021-10-12 RX ORDER — TRAZODONE HYDROCHLORIDE 50 MG/1
50 TABLET ORAL
Qty: 90 TABLET | Refills: 2 | Status: SHIPPED | OUTPATIENT
Start: 2021-10-12

## 2021-10-14 ENCOUNTER — TRANSCRIBE ORDER (OUTPATIENT)
Dept: REGISTRATION | Age: 71
End: 2021-10-14

## 2021-10-14 DIAGNOSIS — Z01.812 PRE-PROCEDURE LAB EXAM: Primary | ICD-10-CM

## 2021-10-14 NOTE — PERIOP NOTES
COVID-19 TESTING APPOINTMENT MADE FOR PATIENT. PATIENT'S WIFE ADVISED TO HAVE PT SELF QUARANTINE BETWEEN TESTING AND ARRIVAL TIME DAY OF SURGERY.

## 2021-10-19 ENCOUNTER — HOSPITAL ENCOUNTER (OUTPATIENT)
Dept: PREADMISSION TESTING | Age: 71
Discharge: HOME | End: 2021-10-19
Payer: MEDICARE

## 2021-10-19 DIAGNOSIS — Z01.812 PRE-PROCEDURE LAB EXAM: ICD-10-CM

## 2021-10-19 PROCEDURE — U0005 INFEC AGEN DETEC AMPLI PROBE: HCPCS

## 2021-10-20 LAB
SARS-COV-2, XPLCVT: NOT DETECTED
SOURCE, COVRS: NORMAL

## 2021-10-20 NOTE — PERIOP NOTES
PAT PREOP PHONE INTERVIEW COMPLETED WITH: AYZ ,WIFE    PATIENT ADVISED NOT TO EAT/DRINK ANYTHING PAST MIDNIGHT EVENING PRIOR TO SURGERY,  NOTHING TO EAT/DRINK MORNING OF SURGERY UNLESS SIP OF WATER TO SWALLOW MEDICATION;  LEAVE ALL VALUABLES AT HOME; DO BRING PICTURE ID, INSURANCE CARD AND ANY COPAY; WEAR COMFORTABLE CLOTHING;  NO PERFUMES, POWDERS, LOTIONS; NO ALCOHOL 24 HOURS BEFORE OR AFTER SURGERY;  WILL NEED TO BE DRIVEN HOME BY FAMILY OR FRIEND;  AVOID TAKING NSAIDS, ASPIRIN, FISH OIL, VITAMIN E OR GLUCOSAMINE/CHONDROITIN DURING THIS TIME PRIOR TO SURGERY;  MAY TAKE TYLENOL. INSTRUCTED TO REPORT  [address] BY SURGEON'S OFFICE.

## 2021-10-22 ENCOUNTER — ANESTHESIA EVENT (OUTPATIENT)
Dept: SURGERY | Age: 71
End: 2021-10-22
Payer: MEDICARE

## 2021-10-22 ENCOUNTER — ANESTHESIA (OUTPATIENT)
Dept: SURGERY | Age: 71
End: 2021-10-22
Payer: MEDICARE

## 2021-10-22 ENCOUNTER — TELEPHONE (OUTPATIENT)
Dept: SURGERY | Age: 71
End: 2021-10-22

## 2021-10-22 ENCOUNTER — HOSPITAL ENCOUNTER (OUTPATIENT)
Age: 71
Setting detail: OUTPATIENT SURGERY
Discharge: HOME | End: 2021-10-22
Attending: SURGERY | Admitting: SURGERY
Payer: MEDICARE

## 2021-10-22 VITALS
TEMPERATURE: 97.5 F | HEIGHT: 72 IN | BODY MASS INDEX: 24.39 KG/M2 | OXYGEN SATURATION: 97 % | WEIGHT: 180.05 LBS | DIASTOLIC BLOOD PRESSURE: 76 MMHG | RESPIRATION RATE: 13 BRPM | HEART RATE: 92 BPM | SYSTOLIC BLOOD PRESSURE: 120 MMHG

## 2021-10-22 DIAGNOSIS — K40.90 RIGHT INGUINAL HERNIA: Primary | ICD-10-CM

## 2021-10-22 PROCEDURE — 76210000016 HC OR PH I REC 1 TO 1.5 HR: Performed by: SURGERY

## 2021-10-22 PROCEDURE — S2900 ROBOTIC SURGICAL SYSTEM: HCPCS | Performed by: SURGERY

## 2021-10-22 PROCEDURE — 74011000250 HC RX REV CODE- 250: Performed by: REGISTERED NURSE

## 2021-10-22 PROCEDURE — 77030040361 HC SLV COMPR DVT MDII -B: Performed by: SURGERY

## 2021-10-22 PROCEDURE — 77030031139 HC SUT VCRL2 J&J -A: Performed by: SURGERY

## 2021-10-22 PROCEDURE — 77030002912 HC SUT ETHBND J&J -A: Performed by: SURGERY

## 2021-10-22 PROCEDURE — 74011000250 HC RX REV CODE- 250: Performed by: SURGERY

## 2021-10-22 PROCEDURE — 77030035277 HC OBTRTR BLDELSS DISP INTU -B: Performed by: SURGERY

## 2021-10-22 PROCEDURE — 77030010507 HC ADH SKN DERMBND J&J -B: Performed by: SURGERY

## 2021-10-22 PROCEDURE — 76060000034 HC ANESTHESIA 1.5 TO 2 HR: Performed by: SURGERY

## 2021-10-22 PROCEDURE — C1781 MESH (IMPLANTABLE): HCPCS | Performed by: SURGERY

## 2021-10-22 PROCEDURE — 77030020703 HC SEAL CANN DISP INTU -B: Performed by: SURGERY

## 2021-10-22 PROCEDURE — 76010000875 HC OR TIME 1.5 TO 2HR INTENSV - TIER 2: Performed by: SURGERY

## 2021-10-22 PROCEDURE — 77030022704 HC SUT VLOC COVD -B: Performed by: SURGERY

## 2021-10-22 PROCEDURE — 77030008684 HC TU ET CUF COVD -B: Performed by: STUDENT IN AN ORGANIZED HEALTH CARE EDUCATION/TRAINING PROGRAM

## 2021-10-22 PROCEDURE — 74011250636 HC RX REV CODE- 250/636: Performed by: STUDENT IN AN ORGANIZED HEALTH CARE EDUCATION/TRAINING PROGRAM

## 2021-10-22 PROCEDURE — 76210000021 HC REC RM PH II 0.5 TO 1 HR: Performed by: SURGERY

## 2021-10-22 PROCEDURE — 77030002933 HC SUT MCRYL J&J -A: Performed by: SURGERY

## 2021-10-22 PROCEDURE — 77030026438 HC STYL ET INTUB CARD -A: Performed by: STUDENT IN AN ORGANIZED HEALTH CARE EDUCATION/TRAINING PROGRAM

## 2021-10-22 PROCEDURE — 77030016151 HC PROTCTR LNS DFOG COVD -B: Performed by: SURGERY

## 2021-10-22 PROCEDURE — 77030013079 HC BLNKT BAIR HGGR 3M -A: Performed by: STUDENT IN AN ORGANIZED HEALTH CARE EDUCATION/TRAINING PROGRAM

## 2021-10-22 PROCEDURE — 74011250636 HC RX REV CODE- 250/636: Performed by: SURGERY

## 2021-10-22 PROCEDURE — 2709999900 HC NON-CHARGEABLE SUPPLY: Performed by: SURGERY

## 2021-10-22 PROCEDURE — 49650 LAP ING HERNIA REPAIR INIT: CPT | Performed by: SURGERY

## 2021-10-22 PROCEDURE — 74011250636 HC RX REV CODE- 250/636: Performed by: REGISTERED NURSE

## 2021-10-22 DEVICE — LAPAROSCOPIC SELF-FIXATING MESH, RIGHT ANATOMICAL
Type: IMPLANTABLE DEVICE | Site: INGUINAL | Status: FUNCTIONAL
Brand: PROGRIP

## 2021-10-22 RX ORDER — ONDANSETRON 2 MG/ML
4 INJECTION INTRAMUSCULAR; INTRAVENOUS AS NEEDED
Status: DISCONTINUED | OUTPATIENT
Start: 2021-10-22 | End: 2021-10-22 | Stop reason: HOSPADM

## 2021-10-22 RX ORDER — DEXAMETHASONE SODIUM PHOSPHATE 4 MG/ML
INJECTION, SOLUTION INTRA-ARTICULAR; INTRALESIONAL; INTRAMUSCULAR; INTRAVENOUS; SOFT TISSUE AS NEEDED
Status: DISCONTINUED | OUTPATIENT
Start: 2021-10-22 | End: 2021-10-22 | Stop reason: HOSPADM

## 2021-10-22 RX ORDER — SODIUM CHLORIDE, SODIUM LACTATE, POTASSIUM CHLORIDE, CALCIUM CHLORIDE 600; 310; 30; 20 MG/100ML; MG/100ML; MG/100ML; MG/100ML
50 INJECTION, SOLUTION INTRAVENOUS CONTINUOUS
Status: DISCONTINUED | OUTPATIENT
Start: 2021-10-22 | End: 2021-10-22 | Stop reason: HOSPADM

## 2021-10-22 RX ORDER — LIDOCAINE HYDROCHLORIDE 20 MG/ML
INJECTION, SOLUTION EPIDURAL; INFILTRATION; INTRACAUDAL; PERINEURAL AS NEEDED
Status: DISCONTINUED | OUTPATIENT
Start: 2021-10-22 | End: 2021-10-22 | Stop reason: HOSPADM

## 2021-10-22 RX ORDER — FENTANYL CITRATE 50 UG/ML
INJECTION, SOLUTION INTRAMUSCULAR; INTRAVENOUS AS NEEDED
Status: DISCONTINUED | OUTPATIENT
Start: 2021-10-22 | End: 2021-10-22 | Stop reason: HOSPADM

## 2021-10-22 RX ORDER — SODIUM CHLORIDE 0.9 % (FLUSH) 0.9 %
5-40 SYRINGE (ML) INJECTION EVERY 8 HOURS
Status: DISCONTINUED | OUTPATIENT
Start: 2021-10-22 | End: 2021-10-22 | Stop reason: HOSPADM

## 2021-10-22 RX ORDER — ROCURONIUM BROMIDE 10 MG/ML
INJECTION, SOLUTION INTRAVENOUS AS NEEDED
Status: DISCONTINUED | OUTPATIENT
Start: 2021-10-22 | End: 2021-10-22 | Stop reason: HOSPADM

## 2021-10-22 RX ORDER — SODIUM CHLORIDE, SODIUM LACTATE, POTASSIUM CHLORIDE, CALCIUM CHLORIDE 600; 310; 30; 20 MG/100ML; MG/100ML; MG/100ML; MG/100ML
INJECTION, SOLUTION INTRAVENOUS
Status: DISCONTINUED | OUTPATIENT
Start: 2021-10-22 | End: 2021-10-22 | Stop reason: HOSPADM

## 2021-10-22 RX ORDER — HYDROMORPHONE HYDROCHLORIDE 2 MG/ML
INJECTION, SOLUTION INTRAMUSCULAR; INTRAVENOUS; SUBCUTANEOUS AS NEEDED
Status: DISCONTINUED | OUTPATIENT
Start: 2021-10-22 | End: 2021-10-22 | Stop reason: HOSPADM

## 2021-10-22 RX ORDER — PHENYLEPHRINE HCL IN 0.9% NACL 0.4MG/10ML
SYRINGE (ML) INTRAVENOUS AS NEEDED
Status: DISCONTINUED | OUTPATIENT
Start: 2021-10-22 | End: 2021-10-22 | Stop reason: HOSPADM

## 2021-10-22 RX ORDER — PROPOFOL 10 MG/ML
INJECTION, EMULSION INTRAVENOUS AS NEEDED
Status: DISCONTINUED | OUTPATIENT
Start: 2021-10-22 | End: 2021-10-22 | Stop reason: HOSPADM

## 2021-10-22 RX ORDER — LIDOCAINE HYDROCHLORIDE 10 MG/ML
0.1 INJECTION, SOLUTION EPIDURAL; INFILTRATION; INTRACAUDAL; PERINEURAL AS NEEDED
Status: DISCONTINUED | OUTPATIENT
Start: 2021-10-22 | End: 2021-10-22 | Stop reason: HOSPADM

## 2021-10-22 RX ORDER — ONDANSETRON 2 MG/ML
INJECTION INTRAMUSCULAR; INTRAVENOUS AS NEEDED
Status: DISCONTINUED | OUTPATIENT
Start: 2021-10-22 | End: 2021-10-22 | Stop reason: HOSPADM

## 2021-10-22 RX ORDER — FENTANYL CITRATE 50 UG/ML
25 INJECTION, SOLUTION INTRAMUSCULAR; INTRAVENOUS
Status: DISCONTINUED | OUTPATIENT
Start: 2021-10-22 | End: 2021-10-22 | Stop reason: HOSPADM

## 2021-10-22 RX ORDER — LIDOCAINE HYDROCHLORIDE AND EPINEPHRINE 20; 5 MG/ML; UG/ML
1.5 INJECTION, SOLUTION EPIDURAL; INFILTRATION; INTRACAUDAL; PERINEURAL ONCE
Status: COMPLETED | OUTPATIENT
Start: 2021-10-22 | End: 2021-10-22

## 2021-10-22 RX ORDER — IBUPROFEN 800 MG/1
800 TABLET ORAL
Qty: 30 TABLET | Refills: 0 | Status: SHIPPED | OUTPATIENT
Start: 2021-10-22 | End: 2021-11-05

## 2021-10-22 RX ORDER — KETAMINE HYDROCHLORIDE 10 MG/ML
INJECTION, SOLUTION INTRAMUSCULAR; INTRAVENOUS AS NEEDED
Status: DISCONTINUED | OUTPATIENT
Start: 2021-10-22 | End: 2021-10-22 | Stop reason: HOSPADM

## 2021-10-22 RX ORDER — SODIUM CHLORIDE 0.9 % (FLUSH) 0.9 %
5-40 SYRINGE (ML) INJECTION AS NEEDED
Status: DISCONTINUED | OUTPATIENT
Start: 2021-10-22 | End: 2021-10-22 | Stop reason: HOSPADM

## 2021-10-22 RX ORDER — NEOSTIGMINE METHYLSULFATE 1 MG/ML
INJECTION, SOLUTION INTRAVENOUS AS NEEDED
Status: DISCONTINUED | OUTPATIENT
Start: 2021-10-22 | End: 2021-10-22 | Stop reason: HOSPADM

## 2021-10-22 RX ORDER — OXYCODONE AND ACETAMINOPHEN 5; 325 MG/1; MG/1
1 TABLET ORAL
Qty: 30 TABLET | Refills: 0 | Status: SHIPPED | OUTPATIENT
Start: 2021-10-22 | End: 2021-10-29

## 2021-10-22 RX ORDER — HYDROMORPHONE HYDROCHLORIDE 1 MG/ML
0.2 INJECTION, SOLUTION INTRAMUSCULAR; INTRAVENOUS; SUBCUTANEOUS
Status: DISCONTINUED | OUTPATIENT
Start: 2021-10-22 | End: 2021-10-22 | Stop reason: HOSPADM

## 2021-10-22 RX ORDER — GLYCOPYRROLATE 0.2 MG/ML
INJECTION INTRAMUSCULAR; INTRAVENOUS AS NEEDED
Status: DISCONTINUED | OUTPATIENT
Start: 2021-10-22 | End: 2021-10-22 | Stop reason: HOSPADM

## 2021-10-22 RX ORDER — MIDAZOLAM HYDROCHLORIDE 1 MG/ML
INJECTION, SOLUTION INTRAMUSCULAR; INTRAVENOUS AS NEEDED
Status: DISCONTINUED | OUTPATIENT
Start: 2021-10-22 | End: 2021-10-22 | Stop reason: HOSPADM

## 2021-10-22 RX ORDER — ACETAMINOPHEN 325 MG/1
650 TABLET ORAL ONCE
Status: DISCONTINUED | OUTPATIENT
Start: 2021-10-22 | End: 2021-10-22 | Stop reason: HOSPADM

## 2021-10-22 RX ORDER — DEXMEDETOMIDINE HYDROCHLORIDE 100 UG/ML
INJECTION, SOLUTION INTRAVENOUS AS NEEDED
Status: DISCONTINUED | OUTPATIENT
Start: 2021-10-22 | End: 2021-10-22 | Stop reason: HOSPADM

## 2021-10-22 RX ORDER — SUCCINYLCHOLINE CHLORIDE 20 MG/ML
INJECTION INTRAMUSCULAR; INTRAVENOUS AS NEEDED
Status: DISCONTINUED | OUTPATIENT
Start: 2021-10-22 | End: 2021-10-22 | Stop reason: HOSPADM

## 2021-10-22 RX ADMIN — SODIUM CHLORIDE, POTASSIUM CHLORIDE, SODIUM LACTATE AND CALCIUM CHLORIDE: 600; 310; 30; 20 INJECTION, SOLUTION INTRAVENOUS at 07:27

## 2021-10-22 RX ADMIN — HYDROMORPHONE HYDROCHLORIDE 0.4 MG: 2 INJECTION, SOLUTION INTRAMUSCULAR; INTRAVENOUS; SUBCUTANEOUS at 09:07

## 2021-10-22 RX ADMIN — Medication 40 MCG: at 07:47

## 2021-10-22 RX ADMIN — WATER 2 G: 1 INJECTION INTRAMUSCULAR; INTRAVENOUS; SUBCUTANEOUS at 07:47

## 2021-10-22 RX ADMIN — DEXMEDETOMIDINE HYDROCHLORIDE 4 MCG: 100 INJECTION, SOLUTION, CONCENTRATE INTRAVENOUS at 08:07

## 2021-10-22 RX ADMIN — DEXAMETHASONE SODIUM PHOSPHATE 8 MG: 4 INJECTION, SOLUTION INTRAMUSCULAR; INTRAVENOUS at 07:47

## 2021-10-22 RX ADMIN — ROCURONIUM BROMIDE 10 MG: 10 SOLUTION INTRAVENOUS at 08:40

## 2021-10-22 RX ADMIN — ONDANSETRON HYDROCHLORIDE 4 MG: 2 INJECTION, SOLUTION INTRAMUSCULAR; INTRAVENOUS at 09:01

## 2021-10-22 RX ADMIN — ROCURONIUM BROMIDE 25 MG: 10 SOLUTION INTRAVENOUS at 07:49

## 2021-10-22 RX ADMIN — FENTANYL CITRATE 25 MCG: 50 INJECTION, SOLUTION INTRAMUSCULAR; INTRAVENOUS at 09:35

## 2021-10-22 RX ADMIN — MIDAZOLAM 1 MG: 1 INJECTION INTRAMUSCULAR; INTRAVENOUS at 07:27

## 2021-10-22 RX ADMIN — FENTANYL CITRATE 25 MCG: 50 INJECTION, SOLUTION INTRAMUSCULAR; INTRAVENOUS at 09:30

## 2021-10-22 RX ADMIN — FENTANYL CITRATE 50 MCG: 50 INJECTION, SOLUTION INTRAMUSCULAR; INTRAVENOUS at 07:34

## 2021-10-22 RX ADMIN — KETAMINE HYDROCHLORIDE 30 MG: 10 INJECTION, SOLUTION INTRAMUSCULAR; INTRAVENOUS at 07:34

## 2021-10-22 RX ADMIN — NEOSTIGMINE METHYLSULFATE 3 MG: 1 INJECTION, SOLUTION INTRAVENOUS at 09:01

## 2021-10-22 RX ADMIN — ROCURONIUM BROMIDE 5 MG: 10 SOLUTION INTRAVENOUS at 07:34

## 2021-10-22 RX ADMIN — GLYCOPYRROLATE 0.4 MG: 0.2 INJECTION, SOLUTION INTRAMUSCULAR; INTRAVENOUS at 09:01

## 2021-10-22 RX ADMIN — HYDROMORPHONE HYDROCHLORIDE 0.4 MG: 2 INJECTION, SOLUTION INTRAMUSCULAR; INTRAVENOUS; SUBCUTANEOUS at 09:10

## 2021-10-22 RX ADMIN — PHENYLEPHRINE HYDROCHLORIDE 20 MCG/MIN: 10 INJECTION INTRAVENOUS at 07:45

## 2021-10-22 RX ADMIN — FENTANYL CITRATE 50 MCG: 50 INJECTION, SOLUTION INTRAMUSCULAR; INTRAVENOUS at 07:58

## 2021-10-22 RX ADMIN — SUCCINYLCHOLINE CHLORIDE 140 MG: 20 INJECTION, SOLUTION INTRAMUSCULAR; INTRAVENOUS at 07:34

## 2021-10-22 RX ADMIN — PROPOFOL 150 MG: 10 INJECTION, EMULSION INTRAVENOUS at 07:34

## 2021-10-22 RX ADMIN — LIDOCAINE HYDROCHLORIDE 80 MG: 20 INJECTION, SOLUTION EPIDURAL; INFILTRATION; INTRACAUDAL; PERINEURAL at 07:34

## 2021-10-22 RX ADMIN — ROCURONIUM BROMIDE 20 MG: 10 SOLUTION INTRAVENOUS at 07:57

## 2021-10-22 NOTE — TELEPHONE ENCOUNTER
Returned call Mrs Marielena Treadwell verified all PHI. Patient had Robotic Rt Inguinal Hernia Repair today. Mrs Marieelna Treadwell wants to know how long it will be before he urinates after the anesthesia? I informed both Mrs Friedmaneann Souravronald and patient as per Dr Deric Soto if he still has not urinated by this evening about 5 PM, he is to go to the ER. He is to drink fluids.

## 2021-10-22 NOTE — ANESTHESIA PREPROCEDURE EVALUATION
Relevant Problems   No relevant active problems       Anesthetic History   No history of anesthetic complications            Review of Systems / Medical History  Patient summary reviewed, nursing notes reviewed and pertinent labs reviewed    Pulmonary  Within defined limits      Sleep apnea           Neuro/Psych   Within defined limits      Psychiatric history     Cardiovascular  Within defined limits                Exercise tolerance: >4 METS     GI/Hepatic/Renal  Within defined limits              Endo/Other        Arthritis     Other Findings              Physical Exam    Airway  Mallampati: III  TM Distance: 4 - 6 cm  Neck ROM: normal range of motion   Mouth opening: Normal     Cardiovascular    Rhythm: regular  Rate: normal         Dental  No notable dental hx       Pulmonary  Breath sounds clear to auscultation               Abdominal  Abdominal exam normal       Other Findings            Anesthetic Plan    ASA: 2  Anesthesia type: general          Induction: Intravenous  Anesthetic plan and risks discussed with: Patient

## 2021-10-22 NOTE — ANESTHESIA POSTPROCEDURE EVALUATION
Post-Anesthesia Evaluation and Assessment    Patient: Jose Sherman MRN: [id number]  SSN: [id number]    YOB: 1950  Age: 66 y.o. Sex: male      I have evaluated the patient and they are stable and ready for discharge from the PACU. Cardiovascular Function/Vital Signs  Visit Vitals  BP (!) 103/47   Pulse 93   Temp 36.6 °C (97.8 °F)   Resp 14   Ht 6' (1.829 m)   Wt 81.7 kg (180 lb 0.8 oz)   SpO2 96%   BMI 24.42 kg/m²       Patient is status post General anesthesia for Procedure(s):  ROBOTIC RIGHT INGUINAL HERNIA REPAIR WITH MESH. Nausea/Vomiting: None    Postoperative hydration reviewed and adequate. Pain:  Pain Scale 1: Adult Nonverbal Pain Scale (10/22/21 0924)  Pain Intensity 1: 0 (10/22/21 0924)   Managed    Neurological Status: At baseline    Mental Status, Level of Consciousness: Alert and  oriented to person, place, and time    Pulmonary Status:   O2 Device: Nasal cannula (10/22/21 0925)   Adequate oxygenation and airway patent    Complications related to anesthesia: None    Post-anesthesia assessment completed.  No concerns    Signed By: Erica Cabrera DO     October 22, 2021

## 2021-10-22 NOTE — PERIOP NOTES
Discharge instructions and medications reviewed with patient and wife Adonay Quintana. Opportunity was given for patient and responsible party to ask questions. No further questions at this time. Responsible party and patient verbalizes understanding of discharge instructions. A Copy of discharge instructions given to patient. Patient discharged with all belongings.

## 2021-10-22 NOTE — TELEPHONE ENCOUNTER
Wife called stating that the patient is irritable and angry- \"obsessing over the issue of not being able to urinate. \" She is unsure of what to do for the patient and would like to know about how long it normally takes for medications from the surgery to wear off so the patient can urinate. Please advise.

## 2021-10-22 NOTE — DISCHARGE INSTRUCTIONS
Inguinal Hernia Repair      Patient Discharge Instructions    Leila Edson / [id number] : 1950    Admitted 10/22/2021 Discharged: 10/22/2021     · It is important that you take the medication exactly as they are prescribed. · Keep your medication in the bottles provided by the pharmacist and keep a list of the medication names, dosages, and times to be taken in your wallet. · Do not take other medications without consulting your doctor. · Wound care: You may shower starting tomorrow. Do not remove the dermabond on the incision, it will fall of on its own in a few weeks. · No heavy lifting or strenuous activity for 2wks after the operation    Take ibuprofen (Motrin) as scheduled then combine with oxycodone/acetaminophen (Percocet, Roxicet, Tylox) as needed for severe pain. What to do at Home    See instructions below. Follow-up with Dr. Regulo Miramontes in 2 {TIME FRAME:3315}. Call the office ([phone]) to schedule your appointment. Information obtained by :  I understand that if any problems occur once I am at home I am to contact my physician. I understand and acknowledge receipt of the instructions indicated above. [de-identified] or R.N.'s Signature                                                                  Date/Time                                                                                                                                              Patient or Representative Signature                                                          Date/Time     Inguinal Hernia Repair: What to Expect at [address] are likely to have pain for the next few days. You may also feel like you have the flu, and you may have a low fever and feel tired and nauseated. This is common.   You should feel better after a few days and will probably feel much better in 7 days. For several weeks you may feel twinges or pulling in the hernia repair when you move. You may have some bruising on the scrotum and along the penis. This is normal. Men will need to wear a jockstrap or briefs, not boxers, for scrotal support for several days after a groin (inguinal) hernia repair. Spandex bicycle shorts may provide good support. This care sheet gives you a general idea about how long it will take for you to recover. But each person recovers at a different pace. Follow the steps below to get better as quickly as possible. How can you care for yourself at home? Activity  · Rest when you feel tired. Getting enough sleep will help you recover. Sleep with your head up by using three or four pillows. You can also try to sleep with your head up in a recliner chair. Do not sleep on your side or stomach. · Try to walk each day. Start by walking a little more than you did the day before. Bit by bit, increase the amount you walk. Walking boosts blood flow and helps prevent pneumonia and constipation. · Put ice or a cold pack on the area of your hernia repair for 10 to 20 minutes at a time. Try to do this every 1 to 2 hours for the first 24 hours (when you are awake) or until the swelling goes down. Put a thin cloth between the ice and your skin. · Avoid strenuous activities, such as biking, jogging, weight lifting, or aerobic exercise, until your doctor says it is okay. · Avoid lifting anything that would make you strain. This may include heavy grocery bags and milk containers, a heavy briefcase or backpack, cat litter or dog food bags, a vacuum , or a child. · You may drive when you are no longer taking pain medicine and can quickly move your foot from the gas pedal to the brake. You must also be able to sit comfortably for a long period of time, even if you do not plan to go far. You might get caught in traffic.   · Most people are able to return to work within 1 to 2 weeks after surgery. · You may shower 24 to 48 hours after surgery, if your doctor okays it. Pat the cut (incision) dry. Do not take a bath for the first 2 weeks, or until your doctor tells you it is okay. · Your doctor will tell you when you can have sex again. Diet  · You can eat your normal diet. If your stomach is upset, try bland, low-fat foods like plain rice, broiled chicken, toast, and yogurt. · Drink plenty of fluids (unless your doctor tells you not to). · You may notice that your bowel movements are not regular right after your surgery. This is common. Avoid constipation and straining with bowel movements. You may want to take a fiber supplement every day. If you have not had a bowel movement after a couple of days, ask your doctor about taking a mild laxative. Medicines  · Take pain medicines exactly as directed. ¨ If the doctor gave you a prescription medicine for pain, take it as prescribed. ¨ If you are not taking a prescription pain medicine, take an over-the-counter medicine such as acetaminophen (Tylenol), ibuprofen (Advil, Motrin), or naproxen (Aleve). Read and follow all instructions on the label. ¨ Do not take two or more pain medicines at the same time unless the doctor told you to. Many pain medicines have acetaminophen, which is Tylenol. Too much acetaminophen (Tylenol) can be harmful. · If your doctor prescribed antibiotics, take them as directed. Do not stop taking them just because you feel better. You need to take the full course of antibiotics. · If you think your pain medicine is making you sick to your stomach:  ¨ Take your medicine after meals (unless your doctor has told you not to). ¨ Ask your doctor for a different pain medicine. Incision care  · Wash the area daily with warm, soapy water and pat it dry. Follow-up care is a key part of your treatment and safety. Be sure to make and go to all appointments, and call your doctor if you are having problems.  It's also a good idea to know your test results and keep a list of the medicines you take. When should you call for help? Call 911 anytime you think you may need emergency care. For example, call if:  · You passed out (lost consciousness). · You have sudden chest pain and shortness of breath, or you cough up blood. · You have severe pain in your belly. Call your doctor now or seek immediate medical care if:  · You are sick to your stomach and cannot keep fluids down. · You have signs of a blood clot, such as:  ¨ Pain in your calf, back of the knee, thigh, or groin. ¨ Redness and swelling in your leg or groin. · You have trouble passing urine or stool, especially if you have mild pain or swelling in your lower belly. · Bright red blood has soaked through the bandage over your incision. Watch closely for any changes in your health, and be sure to contact your doctor if:  · Your swelling is getting worse. · Your swelling is not going down. Where can you learn more? Go to [employer]  Enter Z506 in the search box to learn more about \"Hernia Repair: What to Expect at Home. \"   © 3605-1100 [employer], Incorporated. Care instructions adapted under license by [facility] [employer] (which disclaims liability or warranty for this information). This care instruction is for use with your licensed healthcare professional. If you have questions about a medical condition or this instruction, always ask your healthcare professional. [place] [address] any warranty or liability for your use of this information. Content Version: 3.7.39647;  Last Revised: January 21, 2011    ______________________________________________________________________    Anesthesia Discharge Instructions    After general anesthesia or intervenous sedation, for 24 hours or while taking prescription Narcotics:  · Limit your activities  · Do not drive or operate hazardous machinery  · If you have not urinated within 8 hours after discharge, please contact your surgeon on call. · Do not make important personal or business decisions  · Do not drink alcoholic beverages    Report the following to your surgeon:  · Excessive pain, swelling, redness or odor of or around the surgical area  · Temperature over 100.5 degrees  · Nausea and vomiting lasting longer than 4 hours or if unable to take medication  · Any signs of decreased circulation or nerve impairment to extremity:  Change in color, persistent numbness, tingling, coldness or increased pain.   · Any questions

## 2021-10-22 NOTE — OP NOTES
[address]   OPERATIVE REPORT    Name:  Andrei Marquez  MR#:  [id number]  :  1950  ACCOUNT #:  [de-identified]  DATE OF SERVICE:  10/22/2021      PREOPERATIVE DIAGNOSIS:  Right inguinal hernia. POSTOPERATIVE DIAGNOSIS:  Right inguinal hernia. PROCEDURE PERFORMED:  Robotic right inguinal hernia repair with mesh. SURGEON:  Leonel Hook MD    ASSISTANT:  Brayden Daigle SA    ANESTHESIA:  General.    COMPLICATIONS:  None. SPECIMENS REMOVED:  None. IMPLANTS:  16 x 12 cm right-sided Parietex ProGrip mesh. ESTIMATED BLOOD LOSS:  Minimal.    DRAINS:  None. FINDINGS:  Medium-size right-sided direct inguinal hernia repaired with a 16 x 12 cm Parietex ProGrip mesh placed in the preperitoneal plane. INDICATION FOR THE OPERATION:  The patient is a 69-year-old male who has a history of a right-sided inguinal hernia that is symptomatic and needing repair with mesh in the operating room. PROCEDURE:  The patient was met in the preoperative holding area. The H and P was updated. Consent was signed. All risks and benefits were explained to the patient prior to the start of the operation. He was taken back to the operating room. He was lying in a supine position. The abdomen and right groin area were prepped and draped in standard sterile fashion. Time-out was called. The antibiotics were given. The SCDs were on lower extremities. We started the operation by making an 8-mm VisiPort incision into the left upper quadrant, inserting a da Akshat VisiPort trocar into the intra-abdominal cavity, insufflating to 15 mmHg. We then placed an 8-mm trocar superior to the umbilicus and another 8-mm port in the right upper quadrant. All the ports were placed under direct visualization. There were no injuries to the underlying abdominal structures. We then had the patient placed into Trendelenburg positioning and then docked the [employer] robot onto the patient.   We then started our repair of the right inguinal hernia which was a direct medium size hernia. We dissected into the preperitoneal plane going from medial to lateral in the right lower quadrant dissecting into that preperitoneal plane all the way down to the hernia sac to the lateral abdominal wall and then down to the pubic bone. We dissected out the peritoneum from the direct space very easily. It was in the somewhat of the medial defect, so we dissected a little bit more medially past the midline to ensure we would have adequate overlap for our mesh. We dissected down well below the pubic bone and dissected free the testicular vessels and vas deferens which were all identified and preserved during the dissection. Once our dissection plane was complete, we then placed a 16 x 12 cm Parietex ProGrip mesh placing into the preperitoneal plane centering it over our defect, sheeting it a little bit more medial to make sure we had adequate overlap of our medial direct inguinal hernia. We made sure the mesh went about a centimeter or so below the pubic bone and then we sutured it. We made sure the mesh  were adherent all the way around and the mesh was centered. We then tacked the mesh down to the pubic bone in two locations with a 2-0 Ethibond suture and then sutured it to the anterior abdominal wall with an Ethibond suture as well. The mesh was adherent. Everything looked good. We then closed our peritoneal flap with a 3-0 absorbable V-Loc suture in a running fashion. The peritoneal flap was closed. There were no holes in the peritoneum. Our mesh was adherent and the flap was closed. We then removed our needles from the belly and then we desufflated the abdominal cavity, undocked the [employer] robot, and then closed our trocar sites with 4-0 Monocryl and Dermabond to complete the operation. Dr. Jennifer Corrales was present and scrubbed during the entire operation. The counts were correct.         [facility], MD [email or web address]  D:  10/22/2021 9:17  T:  10/22/2021 13:03  JOB #:  [id number]

## 2021-10-22 NOTE — PERIOP NOTES
Patient: Richard Jiang MRN: [id number]  SSN: [id number]   YOB: 1950  Age: 66 y.o. Sex: male     Patient is status post Procedure(s):  ROBOTIC RIGHT INGUINAL HERNIA REPAIR WITH MESH.     Surgeon(s) and Role:     Otilia Felty, MD - Primary    Local/Dose/Irrigation:  1.5% LIDO W EPI                  Peripheral IV 10/22/21 Anterior;Proximal;Right Forearm (Active)            Airway - Endotracheal Tube 10/22/21 Oral (Active)                   Dressing/Packing:  Incision 10/22/21 Abdomen-Dressing/Treatment: Skin glue (10/22/21 0900)    Splint/Cast:  ]    Other:

## 2021-10-22 NOTE — BRIEF OP NOTE
Brief Postoperative Note    Patient: Fabrice Otero  YOB: 1950  MRN: [id number]    Date of Procedure: 10/22/2021     Pre-Op Diagnosis: Right inguinal hernia [K40.90]    Post-Op Diagnosis: Same as preoperative diagnosis. Procedure(s):  ROBOTIC RIGHT INGUINAL HERNIA REPAIR WITH MESH    Surgeon(s):  Batool Johnson MD    Surgical Assistant: Surg Asst-1: Jenni Mann    Anesthesia: General     Estimated Blood Loss (mL): Minimal    Complications: None    Specimens: * No specimens in log *     Implants:   Implant Name Type Inv.  Item Serial No.  Lot No. LRB No. Used Action   MESH [id number] R SOHAM - SNA Mesh MESH 16X12 R SOHAM NA [employer] COVIDIEN [id number] Right 1 Implanted       Drains: * No LDAs found *    Findings: medium size right direct inguinal hernia repaired with a [id number] parietex progrip mesh placed preperitoneal    Electronically Signed by Jessie Mclain MD on 10/22/2021 at 9:06 AM

## 2021-10-22 NOTE — INTERVAL H&P NOTE
Update History & Physical      THe surgery was reviewed with the patient and I examined the patient. There was no change. The surgical site was confirmed by the patient and me. Plan:  The risk, benefits, expected outcome, and alternative to the recommended procedure have been discussed with the patient. Patient understands and wants to proceed with the procedure.     Electronically signed by Whitney Swift MD on 10/22/2021 at 7:04 AM

## 2021-11-03 ENCOUNTER — TELEPHONE (OUTPATIENT)
Dept: SURGERY | Age: 71
End: 2021-11-03

## 2021-11-03 NOTE — TELEPHONE ENCOUNTER
Called patient to remind them of their up coming appointment on Friday, November 5th  Inform patient on cancellation policy and [employer].

## 2021-11-05 ENCOUNTER — OFFICE VISIT (OUTPATIENT)
Dept: SURGERY | Age: 71
End: 2021-11-05
Payer: MEDICARE

## 2021-11-05 VITALS
HEART RATE: 78 BPM | SYSTOLIC BLOOD PRESSURE: 131 MMHG | HEIGHT: 72 IN | WEIGHT: 184 LBS | TEMPERATURE: 97.8 F | DIASTOLIC BLOOD PRESSURE: 83 MMHG | BODY MASS INDEX: 24.92 KG/M2 | OXYGEN SATURATION: 97 %

## 2021-11-05 DIAGNOSIS — Z98.890 S/P RIGHT INGUINAL HERNIA REPAIR: ICD-10-CM

## 2021-11-05 DIAGNOSIS — Z09 FOLLOW-UP EXAMINATION AFTER ABDOMINAL SURGERY: Primary | ICD-10-CM

## 2021-11-05 DIAGNOSIS — Z87.19 S/P RIGHT INGUINAL HERNIA REPAIR: ICD-10-CM

## 2021-11-05 PROCEDURE — 99024 POSTOP FOLLOW-UP VISIT: CPT | Performed by: NURSE PRACTITIONER

## 2021-11-05 NOTE — PATIENT INSTRUCTIONS
Recommendations after abdominal hernia surgery:    -  Avoid heavy lifting and or straining anything > about 15 lbs for another 2 weeks     -  Avoid abdominal exercises for another 2 weeks     -  Daily walking and resuming your normal day to day activities is encouraged and as tolerated      -  Ok to bath as normal and you may get in a swimming pool     -  Use sun block on exposed skin and ok to moisturize the incisions as desired

## 2021-11-05 NOTE — PROGRESS NOTES
1. Have you been to the ER, urgent care clinic since your last visit? Hospitalized since your last visit? No    2. Have you seen or consulted any other health care providers outside of the [address] [place] [address] since your last visit? Include any pap smears or colon screening.  No

## 2021-11-05 NOTE — PROGRESS NOTES
Chief Complaint   Patient presents with    Post OP Follow Up     10/22/2021: NL: DELLA RT ING HR W MESH,   PO     Jeannette Edy is 2 weeks s/p ROBOTIC RIGHT INGUINAL HERNIA REPAIR WITH MESH  Doing well   No pain   No fever or chills, chest pain or shortness of breath. Voiding without difficulty   BM ok after 1st few days   No constipation   Tolerating regular diet     Visit Vitals  /83 (BP 1 Location: Left upper arm, BP Patient Position: Sitting, BP Cuff Size: Adult)   Pulse 78   Temp 97.8 °F (36.6 °C) (Oral)   Ht 6' (1.829 m)   Wt 184 lb (83.5 kg)   SpO2 97%   BMI 24.95 kg/m²     A + O x 3  Chest  CTA  COR  RRR  ABD Soft, lap sites C/D/I and no erythema or induration; NT/ND, +BS, no masses or hernias. EXT No edema; ambulating independently      ICD-10-CM ICD-9-CM    1. Follow-up examination after abdominal surgery  Z09 V67.09    2. S/P right inguinal hernia repair  Z98.890 V45.89     Z87.19       2 weeks s/p ROBOTIC RIGHT INGUINAL HERNIA REPAIR WITH MESH  Doing well   Diet as desired   May shower, bath and swim   Avoid heavy lifting or sit ups for another month   Jeannette Snow verbalized understanding and questions were answered to the best of my knowledge and ability. Activity  educational materials were provided.     Discharged from surgical care with prn follow up

## 2022-01-08 DIAGNOSIS — G31.84 MILD COGNITIVE IMPAIRMENT: ICD-10-CM

## 2022-01-11 NOTE — TELEPHONE ENCOUNTER
PCP: Diana Avery MD    Last appt: 8/24/2021  No future appointments.     Requested Prescriptions     Pending Prescriptions Disp Refills    donepeziL (ARICEPT) 10 mg tablet [Pharmacy Med Name: DONEPEZIL HCL 10 MG TABLET] 90 Tablet 1     Sig: TAKE 1 TABLET BY MOUTH EVERY DAY AT NIGHT         Prior labs and Blood pressures:  BP Readings from Last 3 Encounters:   11/05/21 131/83   10/22/21 120/76   09/27/21 138/76     Lab Results   Component Value Date/Time    Sodium 138 08/24/2021 11:16 AM    Potassium 4.2 08/24/2021 11:16 AM    Chloride 107 08/24/2021 11:16 AM    CO2 28 08/24/2021 11:16 AM    Anion gap 3 (L) 08/24/2021 11:16 AM    Glucose 92 08/24/2021 11:16 AM    BUN 15 08/24/2021 11:16 AM    Creatinine 0.97 08/24/2021 11:16 AM    BUN/Creatinine ratio 15 08/24/2021 11:16 AM    GFR est AA >60 08/24/2021 11:16 AM    GFR est non-AA >60 08/24/2021 11:16 AM    Calcium 9.3 08/24/2021 11:16 AM     No results found for: HBA1C, IHR3DNNX, GSG5ZXOU  Lab Results   Component Value Date/Time    Cholesterol, total 167 08/18/2020 03:08 PM    HDL Cholesterol 63 08/18/2020 03:08 PM    LDL, calculated 90 08/18/2020 03:08 PM    VLDL, calculated 14 08/18/2020 03:08 PM    Triglyceride 72 08/18/2020 03:08 PM     Lab Results   Component Value Date/Time    Vitamin D 25-Hydroxy 42.8 12/09/2020 11:46 AM       Lab Results   Component Value Date/Time    TSH 1.42 08/24/2021 11:16 AM

## 2022-01-12 RX ORDER — DONEPEZIL HYDROCHLORIDE 10 MG/1
TABLET, FILM COATED ORAL
Qty: 90 TABLET | Refills: 1 | Status: SHIPPED | OUTPATIENT
Start: 2022-01-12 | End: 2022-07-08

## 2022-01-28 ENCOUNTER — NURSE TRIAGE (OUTPATIENT)
Dept: OTHER | Facility: CLINIC | Age: 72
End: 2022-01-28

## 2022-01-28 ENCOUNTER — TELEPHONE (OUTPATIENT)
Dept: FAMILY MEDICINE CLINIC | Age: 72
End: 2022-01-28

## 2022-01-28 NOTE — TELEPHONE ENCOUNTER
Called to schedule appt . Pt's wife Camron Mckeon stated that he passed  out yesterday. She was advised by Dr. Dianna Galarza (Nuerologist) to schedule appt to have his BP check. Also, she wants Pt to come off Tamsulosin.

## 2022-01-28 NOTE — TELEPHONE ENCOUNTER
Received call from Curt Patterson at [facility]. Wife states  saw Dr. Ferol Rubinstein Neurologist 2 days ago and he wanted Sukumartta Reading to have an orthostatic BP reading completed in the office before coming back to neurology next weeks. Call placed to office to see if this can be scheduled today. Reason for Disposition  [facility] Caller has already spoken with another triager or PCP AND has further questions AND triager able to answer questions.     Protocols used: NO CONTACT OR DUPLICATE CONTACT CALL-ADULT-

## 2022-01-28 NOTE — TELEPHONE ENCOUNTER
Chief Complaint   Patient presents with    Other     orthostatic blood pressure readings     Patient last office visit 8/2021 with Dr. Janki Artis. Informed  that patient needs an office visit for evaluation.

## 2022-02-09 ENCOUNTER — OFFICE VISIT (OUTPATIENT)
Dept: FAMILY MEDICINE CLINIC | Age: 72
End: 2022-02-09
Payer: MEDICARE

## 2022-02-09 VITALS
OXYGEN SATURATION: 97 % | WEIGHT: 179.6 LBS | HEIGHT: 72 IN | TEMPERATURE: 98.8 F | RESPIRATION RATE: 16 BRPM | BODY MASS INDEX: 24.33 KG/M2

## 2022-02-09 DIAGNOSIS — Z01.30 BLOOD PRESSURE CHECK: ICD-10-CM

## 2022-02-09 DIAGNOSIS — F41.1 GAD (GENERALIZED ANXIETY DISORDER): Primary | ICD-10-CM

## 2022-02-09 DIAGNOSIS — N40.1 BENIGN PROSTATIC HYPERPLASIA WITH NOCTURIA: ICD-10-CM

## 2022-02-09 DIAGNOSIS — R35.1 BENIGN PROSTATIC HYPERPLASIA WITH NOCTURIA: ICD-10-CM

## 2022-02-09 PROCEDURE — 99213 OFFICE O/P EST LOW 20 MIN: CPT | Performed by: NURSE PRACTITIONER

## 2022-02-09 PROCEDURE — 3017F COLORECTAL CA SCREEN DOC REV: CPT | Performed by: NURSE PRACTITIONER

## 2022-02-09 PROCEDURE — G0463 HOSPITAL OUTPT CLINIC VISIT: HCPCS | Performed by: NURSE PRACTITIONER

## 2022-02-09 PROCEDURE — G8536 NO DOC ELDER MAL SCRN: HCPCS | Performed by: NURSE PRACTITIONER

## 2022-02-09 PROCEDURE — 1101F PT FALLS ASSESS-DOCD LE1/YR: CPT | Performed by: NURSE PRACTITIONER

## 2022-02-09 PROCEDURE — G8427 DOCREV CUR MEDS BY ELIG CLIN: HCPCS | Performed by: NURSE PRACTITIONER

## 2022-02-09 PROCEDURE — G8420 CALC BMI NORM PARAMETERS: HCPCS | Performed by: NURSE PRACTITIONER

## 2022-02-09 PROCEDURE — G8432 DEP SCR NOT DOC, RNG: HCPCS | Performed by: NURSE PRACTITIONER

## 2022-02-09 RX ORDER — BUPROPION HYDROCHLORIDE 150 MG/1
150 TABLET ORAL DAILY
Qty: 90 TABLET | Refills: 2 | Status: SHIPPED | OUTPATIENT
Start: 2022-02-09

## 2022-02-09 RX ORDER — MEMANTINE HYDROCHLORIDE 7 MG/1
CAPSULE, EXTENDED RELEASE ORAL
COMMUNITY
Start: 2022-01-27

## 2022-02-09 NOTE — PROGRESS NOTES
[facility] Note  Subjective:      Adrián Nelson is a 66 y.o. male who presents for follow up from [facility] for passing out. EEG was normal/ per neurologist. Patient was advised to check his blood pressure  His wife is requesting to stop Flomax  Since he no longer has frequent unination  Patient has stopped taking Wellbutrin. , wife would like to restart the Wellbutrin     Past Medical History:   Diagnosis Date    Allergic rhinitis, cause unspecified     Anxiety     Anxiety     Anxiety disorder 7/2/2010    AR (allergic rhinitis) 7/2/2010    BPH (benign prostatic hyperplasia)     Burning with urination     Degenerative arthritis of lumbar spine 9/12    Dementia ([facility] [address]     Depression     Eustachian tube dysfunction     Ill-defined condition     WHITE MATTER DISEASE    Insomnia due to other mental disorder 5/19/2021    Mild cognitive impairment 5/19/2021    Obstructive sleep apnea syndrome 5/19/2021    CPAP    Right inguinal hernia 5/3/2016    White matter abnormality on MRI of brain      Past Surgical History:   Procedure Laterality Date    ENDOSCOPY, COLON, DIAGNOSTIC  2007    repeat 2014    HX APPENDECTOMY  1953         HX GI      COLONOSCOPY    HX HERNIA REPAIR  2/85    HX KNEE ARTHROSCOPY  6/02    HX ORTHOPAEDIC  10/75    knee repair    HX TONSILLECTOMY      AS A CHILD    IA TOTAL KNEE ARTHROPLASTY Right 02/2021       Current Outpatient Medications   Medication Sig Dispense Refill    memantine ER (NAMENDA XR) 7 mg capsule TAKE 1 CAPSULE BY MOUTH EVERY DAY FOR 30 DAYS      buPROPion XL (WELLBUTRIN XL) 150 mg tablet Take 1 Tablet by mouth daily. 90 Tablet 2    donepeziL (ARICEPT) 10 mg tablet TAKE 1 TABLET BY MOUTH EVERY DAY AT NIGHT 90 Tablet 1    traZODone (DESYREL) 50 mg tablet Take 1 Tablet by mouth nightly. 90 Tablet 2    tamsulosin (FLOMAX) 0.4 mg capsule Take 1 Capsule by mouth daily.  (Patient taking differently: Take 0.4 mg by mouth nightly.) 90 Capsule 2     No Known Allergies    ROS:   Complete review of systems was reviewed with pertinent information listed in HPI. Review of Systems   Constitutional: Negative. HENT: Negative. Respiratory: Negative. Cardiovascular: Negative. Gastrointestinal: Negative. Genitourinary: Negative. Musculoskeletal: Negative. Psychiatric/Behavioral: Positive for depression. Objective:     Visit Vitals  Temp 98.8 °F (37.1 °C) (Temporal)   Resp 16   Ht 6' (1.829 m)   Wt 179 lb 9.6 oz (81.5 kg)   SpO2 97%   BMI 24.36 kg/m²       Vitals and Nurse Documentation reviewed. Physical Exam  Constitutional:       Appearance: Normal appearance. HENT:      Mouth/Throat:      Mouth: Mucous membranes are moist.   Cardiovascular:      Rate and Rhythm: Regular rhythm. Pulses: Normal pulses. Heart sounds: Normal heart sounds. Comments: BP readings :  Sitting 146/79  Standing 147/80  Lying 138/76  Pulmonary:      Breath sounds: Normal breath sounds. Abdominal:      General: Bowel sounds are normal.      Palpations: Abdomen is soft. Musculoskeletal:      Cervical back: Normal range of motion and neck supple. Neurological:      Mental Status: He is alert. Psychiatric:         Mood and Affect: Mood normal.         Thought Content: Thought content normal.         Assessment/Plan:     Diagnoses and all orders for this visit:    1. RAI (generalized anxiety disorder)  -    start PROPion XL (WELLBUTRIN XL) 150 mg tablet; Take 1 Tablet by mouth daily. 2. Benign prostatic hyperplasia with nocturia,      Stop Flomax, if he is having urinary frequency, resum taking it again    3.  Blood pressure check:    follow up with PCP          Pt expressed understanding with the diagnosis and plan        Discussed expected course/resolution/complications of diagnosis in detail with patient.    Medication risks/benefits/costs/interactions/alternatives discussed with patient.    Pt was given an after visit summary which includes diagnoses, current medications & vitals.  Pt expressed understanding with the diagnosis and plan

## 2022-02-09 NOTE — PROGRESS NOTES
Chief Complaint   Patient presents with    Blood Pressure Check     would like orthostatic bp - patient passed out, went to [facility], had EEG (neuro dr said results were normal)       1. \"Have you been to the ER, urgent care clinic since your last visit? Hospitalized since your last visit? \" No    2. \"Have you seen or consulted any other health care providers outside of the [address] [place] [address] since your last visit? \" Yes When: 01/2022 Where: Dr. Ortiz Staff Neurologist Reason for visit: check up     3. For patients over 45: Has the patient had a colonoscopy?  Yes - no Care Gap present         3 most recent PHQ Screens 2/9/2022   Little interest or pleasure in doing things Not at all   Feeling down, depressed, irritable, or hopeless Not at all   Total Score PHQ 2 0

## 2022-02-17 ENCOUNTER — TELEPHONE (OUTPATIENT)
Dept: FAMILY MEDICINE CLINIC | Age: 72
End: 2022-02-17

## 2022-02-18 DIAGNOSIS — I95.1 ORTHOSTATIC HYPOTENSION: Primary | ICD-10-CM

## 2022-02-18 NOTE — TELEPHONE ENCOUNTER
Returned phone call to patient's wife. Confirmed to identifiers. She states that Neuro did ortho static measures as well and patient's pressure dropped when laying down. They recommend elevating the bed and for the patient to wear compression socks. Also stated to follow up with PCP. Informed Ms. Yu I would reach out to HUMA Avila to see if she would like to refer patient somewhere else before setting up appointment for our office again. She verbalized understanding.

## 2022-02-21 NOTE — TELEPHONE ENCOUNTER
Called patient's wife and confirmed two identifiers. Informed her that NP [address] is referring him to a cardiologist. Provided contact information. Wife will call to schedule appointment.

## 2022-02-22 ENCOUNTER — OFFICE VISIT (OUTPATIENT)
Dept: CARDIOLOGY CLINIC | Age: 72
End: 2022-02-22
Payer: MEDICARE

## 2022-02-22 ENCOUNTER — TELEPHONE (OUTPATIENT)
Dept: CARDIOLOGY CLINIC | Age: 72
End: 2022-02-22

## 2022-02-22 VITALS — BODY MASS INDEX: 24.24 KG/M2 | OXYGEN SATURATION: 98 % | RESPIRATION RATE: 16 BRPM | WEIGHT: 179 LBS | HEIGHT: 72 IN

## 2022-02-22 DIAGNOSIS — R55 SYNCOPE, UNSPECIFIED SYNCOPE TYPE: ICD-10-CM

## 2022-02-22 DIAGNOSIS — G31.84 MILD COGNITIVE IMPAIRMENT: Primary | ICD-10-CM

## 2022-02-22 PROCEDURE — 93005 ELECTROCARDIOGRAM TRACING: CPT | Performed by: SPECIALIST

## 2022-02-22 PROCEDURE — G8510 SCR DEP NEG, NO PLAN REQD: HCPCS | Performed by: SPECIALIST

## 2022-02-22 PROCEDURE — G8420 CALC BMI NORM PARAMETERS: HCPCS | Performed by: SPECIALIST

## 2022-02-22 PROCEDURE — 1101F PT FALLS ASSESS-DOCD LE1/YR: CPT | Performed by: SPECIALIST

## 2022-02-22 PROCEDURE — G8536 NO DOC ELDER MAL SCRN: HCPCS | Performed by: SPECIALIST

## 2022-02-22 PROCEDURE — G8427 DOCREV CUR MEDS BY ELIG CLIN: HCPCS | Performed by: SPECIALIST

## 2022-02-22 PROCEDURE — 99214 OFFICE O/P EST MOD 30 MIN: CPT | Performed by: SPECIALIST

## 2022-02-22 PROCEDURE — 93010 ELECTROCARDIOGRAM REPORT: CPT | Performed by: SPECIALIST

## 2022-02-22 PROCEDURE — G0463 HOSPITAL OUTPT CLINIC VISIT: HCPCS | Performed by: SPECIALIST

## 2022-02-22 PROCEDURE — 3017F COLORECTAL CA SCREEN DOC REV: CPT | Performed by: SPECIALIST

## 2022-02-22 RX ORDER — LATANOPROST 50 UG/ML
SOLUTION/ DROPS OPHTHALMIC
COMMUNITY
Start: 2022-01-10

## 2022-02-22 NOTE — TELEPHONE ENCOUNTER
----- Message from Jaz Cole RN sent at 2/22/2022 10:05 AM EST -----  Please send 2 week loop monitor for syncope per Dr. Tad Bang. Thanks!

## 2022-02-22 NOTE — TELEPHONE ENCOUNTER
Patient's wife is calling because they setup his echo schedule test for 3/4//22 at 1:30pm and she would like some clarification about the time frame when the patient should not eat. Please give a call back.     [phone]

## 2022-02-22 NOTE — PROGRESS NOTES
[facility]                                                            OFFICE NOTE        Cloria Alpers M.D.,RADHA Yolette Henderson   1950  [id number]    Date/Time:  2/22/20229:37 AM            SUBJECTIVE:  The patient has been experiencing syncopal episode for last couple years. He does tell me that the syncopal episodes occur even before he started taking any of those medications he is on currently when he was not taking anything by over-the-counter medication. The syncopal episode occurs mostly when sitting up and then standing up. In the middle of the night. The patient denies any chest pain palpitation at the time of syncope. To be  MRI done June 2021 revealed chronic white matter disease. Assessment/Plan    . Syncope: The patient is not orthostatic today. His electrocardio today reveals a normal sinus rhythm with no significant ST-T changes and normal intervals. Clinically his syncope seems to be occurring on account of either vasovagal or hypotensive episode. He does have some risk factors for coronary disease. Proceed with stress echo and with a 2 weeks event monitor at this time. The patient does not have any diagnosis of Parkinson but the abnormal MRI has not been noted. I suspect he does have some autonomic dysfunction causing the majority of his problems. Clearly Flomax may contribute to his syncopal episodes. Nonetheless Flomax was started after syncopal episode started already. Consider stopping nonetheless. I think asking to start increasing salt intake start wearing compression stockings. Following above-mentioned test if all normal may consider tilt table test.  For now Florinefmidodrine or similar will be put on hold. Blood pressure course is normal today. Seen back after above-mentioned test being completed.         HPI   66 years old male with the past medical history remarkable for generalized anxiety disorder, memory impairment, benign prostate hypertrophy who presents today for cardiac evaluation of the syncope. Past medical history: As above    He denies any history hypertension hyperlipidemia diabetes or coronary disease. Surgical history: Left total knee replacement inguinal hernia repair    Family history: His mother underwent bypass surgery in her late 62s    Social history: Quit smoking 2 to 3 years ago. He does not drink. There both active with him and his wife walking apparently daily. CARDIAC STUDIES        08/31/19    ECHO ADULT COMPLETE 09/01/2019 9/1/2019    Interpretation Summary  · Left Ventricle: Normal cavity size, wall thickness and systolic function (ejection fraction normal). Estimated left ventricular ejection fraction is 56 - 60%. Calculated left ventricular ejection fraction is 50%. No regional wall motion abnormality noted. Mild (grade 1) left ventricular diastolic dysfunction. · Aortic Valve: Mild aortic valve sclerosis. Aortic valve area is 2 cm2. · Tricuspid Valve: Mild tricuspid valve regurgitation is present. Signed by: Dar Leyva MD on 9/1/2019  5:52 PM                              EKG Results     Procedure 720 Value Units Date/Time    AMB POC EKG ROUTINE W/ 12 LEADS, INTER & REP [[id number]] Resulted: 02/22/22 0923    Order Status: Completed Updated: 02/22/22 0930              IMAGING      MRI Results (most recent):  Results from [place] encounter on 06/01/21    MRI BRAIN WO CONT    Narrative  INDICATION: Left trigeminal myalgia    EXAMINATION:  MRI BRAIN WO CONTRAST    COMPARISON:  November 14, 2017    TECHNIQUE:  Multiplanar multisequence acquisition without contrast of the brain. Noncontrast axial coronal thin slice imaging through the skull base performed. Imaging was performed on an open MRI. FINDINGS:    Ventricles:  Midline, no hydrocephalus.   Brain Parenchyma/Brainstem:  Mild generalized atrophy. Mild chronic T2  hyperintensities throughout the supratentorial white matter and kirsten. No acute  infarction. Intracranial Hemorrhage:  None. Basal Cisterns:  Normal. No definite cranial nerve abnormality allowing for lack  of IV contrast.  Flow Voids:  Normal.  Additional Comments:  Heterogeneous T2 signal throughout the left maxillary,  ethmoid, and frontal sinuses. Less significant mucosal thickening in the right  frontal, ethmoid and maxillary sinuses. Impression  1. Generalized atrophy and chronic white matter disease as above, with no acute  process. 2. No definite cranial nerve or skull base abnormality allowing for lack of IV  contrast material.  3. Paranasal sinus disease left greater than right. CT Results (most recent):  Results from Hospital Encounter encounter on 09/09/21    CT ABD PELV W CONT    Narrative  CLINICAL HISTORY: Weight loss  INDICATION: Weight loss  COMPARISON: None. CONTRAST: 100  ml Isovue 370  TECHNIQUE:  Multislice helical CT was performed of the abdomen and pelvis following  uneventful rapid bolus intravenous contrast administration. Oral contrast was  not administered. Contiguous 5 mm axial images were reconstructed and lung and  soft tissue windows were generated. Coronal and sagittal reformations were  generated. CT dose reduction was achieved through use of a standardized  protocol tailored for this examination and automatic exposure control for dose  modulation. FINDINGS:  LUNG BASES:No mass lesion or effusion. LIVER: Tiny hepatic hypodensities are most likely simple cysts. There is no  intrahepatic duct dilatation. There is no hepatic parenchymal mass. Hepatic  enhancement pattern is within normal limits. Portal vein is patent. GALLBLADDER:  No dilatation or wall thickening. SPLEEN/PANCREAS: No mass. There is no pancreatic duct dilatation. There is no  evidence of splenomegaly. ADRENALS/KIDNEYS: No mass.   There is no hydronephrosis. There is no renal mass. There is no perinephric mass. STOMACH: No dilatation or wall thickening. COLON AND SMALL BOWEL: Fecal stasis. Colonic diverticulosis. There is no free  intraperitoneal air. There is no evidence of incarceration or obstruction. No  mesenteric adenopathy. PERITONEUM: Inguinal hernia on the right contains loops of small bowel. No  incarceration or obstruction. APPENDIX: Unremarkable. BLADDER/REPRODUCTIVE ORGANS: Enlarged prostate gland. The bladder is  unremarkable. RETROPERITONEUM: Atherosclerotic change is minimal. The abdominal aorta is  normal in caliber. No aneurysm. No retroperitoneal adenopathy. OSSEOUS STRUCTURES: No destructive bone lesion. Impression  No acute intraperitoneal process is identified. Significantly enlarged prostate gland. Colonic diverticulosis without evidence of diverticulitis. Hepatic cysts. Small bowel containing right inguinal hernia without incarceration or  obstruction. Please see above for additional nonemergent incidental findings. XR Results (most recent):  Results from Hospital Encounter encounter on 09/09/21    XR CHEST PA LAT    Narrative  INDICATION: Abnormal weight loss. Additional history:  COMPARISON: Previous chest xray, 8/31/2019. [facility] Cal[facility]o FINDINGS: PA and lateral view of the chest.  .  Lines/tubes/surgical: None. Heart/mediastinum: Unremarkable. Lungs/pleura:  No focal consolidation or mass. No visualized pleural effusion or  pneumothorax. Additional Comments: Slight kyphosis and degenerative changes in the spine. .    Impression  1. No radiographic evidence of acute cardiopulmonary disease.           Past Medical History:   Diagnosis Date    Allergic rhinitis, cause unspecified     Anxiety     Anxiety     Anxiety disorder 7/2/2010    AR (allergic rhinitis) 7/2/2010    BPH (benign prostatic hyperplasia)     Burning with urination     Degenerative arthritis of lumbar spine 9/12    Dementia ([address]  Depression     Eustachian tube dysfunction     Ill-defined condition     WHITE MATTER DISEASE    Insomnia due to other mental disorder 2021    Mild cognitive impairment 2021    Obstructive sleep apnea syndrome 2021    CPAP    Right inguinal hernia 5/3/2016    White matter abnormality on MRI of brain      Past Surgical History:   Procedure Laterality Date    ENDOSCOPY, COLON, DIAGNOSTIC      repeat     HX APPENDECTOMY           HX GI      COLONOSCOPY    HX HERNIA REPAIR      HX KNEE ARTHROSCOPY      HX ORTHOPAEDIC  10/75    knee repair    HX TONSILLECTOMY      AS A CHILD    SC TOTAL KNEE ARTHROPLASTY Right 2021     Social History     Tobacco Use    Smoking status: Former Smoker     Years: 35.00     Types: Cigars     Quit date:      Years since quittin.1    Smokeless tobacco: Never Used    Tobacco comment: off and on   Vaping Use    Vaping Use: Never used   Substance Use Topics    Alcohol use: Yes     Comment: RARE    Drug use: No     Family History   Problem Relation Age of Onset    Heart Disease Mother         CAD s/p CABG 66's    Cancer Father     Alcohol abuse Father     Anesth Problems Neg Hx      No Known Allergies      Visit Vitals  Resp 16   Ht 6' (1.829 m)   Wt 179 lb (81.2 kg)   SpO2 98%   BMI 24.28 kg/m²         Last 3 Recorded Weights in this Encounter    22 0919   Weight: 179 lb (81.2 kg)            Review of Systems:   Pertinent items are noted in the History of Present Illness. Visit Vitals  Resp 16   Ht 6' (1.829 m)   Wt 179 lb (81.2 kg)   SpO2 98%   BMI 24.28 kg/m²     General Appearance:  Well developed, well nourished,alert and oriented x 3, and individual in no acute distress. Ears/Nose/Mouth/Throat:   Hearing grossly normal.         Neck: Supple. Chest:   Lungs clear to auscultation bilaterally. Cardiovascular:  Regular rate and rhythm, S1, S2 normal, no murmur.    Abdomen:   Soft, non-tender, bowel sounds are active. Extremities: No edema bilaterally. Skin: Warm and dry. Current Outpatient Medications on File Prior to Visit   Medication Sig Dispense Refill    latanoprost (XALATAN) 0.005 % ophthalmic solution       memantine ER (NAMENDA XR) 7 mg capsule TAKE 1 CAPSULE BY MOUTH EVERY DAY FOR 30 DAYS      buPROPion XL (WELLBUTRIN XL) 150 mg tablet Take 1 Tablet by mouth daily. 90 Tablet 2    donepeziL (ARICEPT) 10 mg tablet TAKE 1 TABLET BY MOUTH EVERY DAY AT NIGHT 90 Tablet 1    traZODone (DESYREL) 50 mg tablet Take 1 Tablet by mouth nightly. 90 Tablet 2    tamsulosin (FLOMAX) 0.4 mg capsule Take 1 Capsule by mouth daily. (Patient taking differently: Take 0.4 mg by mouth nightly.) 90 Capsule 2     No current facility-administered medications on file prior to visit. Marbin Yu had no medications administered during this visit. Current Outpatient Medications   Medication Sig    latanoprost (XALATAN) 0.005 % ophthalmic solution     memantine ER (NAMENDA XR) 7 mg capsule TAKE 1 CAPSULE BY MOUTH EVERY DAY FOR 30 DAYS    buPROPion XL (WELLBUTRIN XL) 150 mg tablet Take 1 Tablet by mouth daily.  donepeziL (ARICEPT) 10 mg tablet TAKE 1 TABLET BY MOUTH EVERY DAY AT NIGHT    traZODone (DESYREL) 50 mg tablet Take 1 Tablet by mouth nightly.  tamsulosin (FLOMAX) 0.4 mg capsule Take 1 Capsule by mouth daily. (Patient taking differently: Take 0.4 mg by mouth nightly.)     No current facility-administered medications for this visit.          Lab Results   Component Value Date/Time    Cholesterol, total 167 08/18/2020 03:08 PM    HDL Cholesterol 63 08/18/2020 03:08 PM    LDL, calculated 90 08/18/2020 03:08 PM    VLDL, calculated 14 08/18/2020 03:08 PM    Triglyceride 72 08/18/2020 03:08 PM       Lab Results   Component Value Date/Time    Sodium 138 08/24/2021 11:16 AM    Potassium 4.2 08/24/2021 11:16 AM    Chloride 107 08/24/2021 11:16 AM    CO2 28 08/24/2021 11:16 AM    Anion gap 3 (L) 08/24/2021 11:16 AM    Glucose 92 08/24/2021 11:16 AM    BUN 15 08/24/2021 11:16 AM    Creatinine 0.97 08/24/2021 11:16 AM    BUN/Creatinine ratio 15 08/24/2021 11:16 AM    GFR est AA >60 08/24/2021 11:16 AM    GFR est non-AA >60 08/24/2021 11:16 AM    Calcium 9.3 08/24/2021 11:16 AM       Lab Results   Component Value Date/Time    ALT (SGPT) 21 08/24/2021 11:16 AM    Alk. phosphatase 94 08/24/2021 11:16 AM    Bilirubin, total 0.9 08/24/2021 11:16 AM       Lab Results   Component Value Date/Time    WBC 6.7 08/24/2021 11:16 AM    HGB 15.0 08/24/2021 11:16 AM    HCT 48.7 08/24/2021 11:16 AM    PLATELET 106 39/32/5300 11:16 AM    MCV 99.4 (H) 08/24/2021 11:16 AM       Lab Results   Component Value Date/Time    TSH 1.42 08/24/2021 11:16 AM         Lab Results   Component Value Date/Time    Cholesterol, total 167 08/18/2020 03:08 PM    Cholesterol, total 158 10/30/2019 11:29 AM    Cholesterol, total 161 10/17/2018 12:17 PM    Cholesterol, total 176 09/26/2017 12:00 PM    Cholesterol, total 150 05/03/2016 09:40 AM    HDL Cholesterol 63 08/18/2020 03:08 PM    HDL Cholesterol 54 10/30/2019 11:29 AM    HDL Cholesterol 56 10/17/2018 12:17 PM    HDL Cholesterol 60 09/26/2017 12:00 PM    HDL Cholesterol 47 05/03/2016 09:40 AM    LDL, calculated 90 08/18/2020 03:08 PM    LDL, calculated 80 10/30/2019 11:29 AM    LDL, calculated 86 10/17/2018 12:17 PM    LDL, calculated 97 09/26/2017 12:00 PM    LDL, calculated 86 05/03/2016 09:40 AM    Triglyceride 72 08/18/2020 03:08 PM    Triglyceride 119 10/30/2019 11:29 AM    Triglyceride 96 10/17/2018 12:17 PM    Triglyceride 94 09/26/2017 12:00 PM    Triglyceride 87 05/03/2016 09:40 AM                Please note that this dictation was completed with boomtrain, the computer voice recognition software. Quite often unanticipated grammatical, syntax, homophones, and other interpretative errors are inadvertently transcribed by the computer software. Please disregard these errors.   Please excuse any errors that have escaped final proofreading.

## 2022-02-22 NOTE — PATIENT INSTRUCTIONS
An order has been placed for you for a Stress echocardiogram . You will be called to schedule this through [facility]. If you do not hear from them in one week call them at [phone]. A monitor has been ordered for you. This will be mailed to the address given to us. Please read over the instructions given to you about [employer] monitors. If you have any insurance questions regarding coverage and out of pocket cost please contact the number below.       If you have any billing questions regarding deductible or responsibility call ([phone])   If you need additional supplies or issue with your monitor please call ([phone])

## 2022-02-24 NOTE — TELEPHONE ENCOUNTER
TC to pts Wife, ID verified (on HIPAA). States they spoke with [facility] and cleared up their questions.

## 2022-03-07 ENCOUNTER — HOSPITAL ENCOUNTER (OUTPATIENT)
Dept: NON INVASIVE DIAGNOSTICS | Age: 72
Discharge: HOME | End: 2022-03-07
Attending: SPECIALIST
Payer: MEDICARE

## 2022-03-07 VITALS
SYSTOLIC BLOOD PRESSURE: 131 MMHG | DIASTOLIC BLOOD PRESSURE: 83 MMHG | WEIGHT: 179.01 LBS | BODY MASS INDEX: 24.25 KG/M2 | HEIGHT: 72 IN

## 2022-03-07 DIAGNOSIS — R55 SYNCOPE, UNSPECIFIED SYNCOPE TYPE: ICD-10-CM

## 2022-03-07 LAB
ECHO EST RA PRESSURE: 3 MMHG
ECHO RIGHT VENTRICULAR SYSTOLIC PRESSURE (RVSP): 34 MMHG
ECHO TV REGURGITANT MAX VELOCITY: 2.78 M/S
ECHO TV REGURGITANT PEAK GRADIENT: 31 MMHG
STRESS ANGINA INDEX: 0
STRESS BASELINE DIAS BP: 89 MMHG
STRESS BASELINE HR: 69 BPM
STRESS BASELINE ST DEPRESSION: 0 MM
STRESS BASELINE SYS BP: 165 MMHG
STRESS ESTIMATED WORKLOAD: 3.1 METS
STRESS EXERCISE DUR MIN: 3 MIN
STRESS EXERCISE DUR SEC: 54 SEC
STRESS PEAK DIAS BP: 89 MMHG
STRESS PEAK SYS BP: 165 MMHG
STRESS PERCENT HR ACHIEVED: 114 %
STRESS POST PEAK HR: 169 BPM
STRESS RATE PRESSURE PRODUCT: NORMAL BPM*MMHG
STRESS SR DUKE TREADMILL SCORE: 4
STRESS ST DEPRESSION: 0 MM
STRESS STAGE 1 HR: 146 BPM
STRESS STAGE 2 HR: 164 BPM
STRESS STAGE RECOVERY 1 BP: NORMAL MMHG
STRESS STAGE RECOVERY 1 HR: 90 BPM
STRESS TARGET HR: 148 BPM

## 2022-03-07 PROCEDURE — 93351 STRESS TTE COMPLETE: CPT

## 2022-03-07 RX ORDER — ACETAMINOPHEN 500 MG
TABLET ORAL
COMMUNITY
End: 2022-03-17

## 2022-03-07 RX ORDER — PREDNISONE 20 MG/1
TABLET ORAL
COMMUNITY
End: 2022-03-17

## 2022-03-07 RX ORDER — CEPHALEXIN 500 MG/1
500 CAPSULE ORAL 2 TIMES DAILY
COMMUNITY
End: 2022-03-17

## 2022-03-07 RX ORDER — NAPROXEN 250 MG/1
TABLET ORAL 2 TIMES DAILY WITH MEALS
COMMUNITY
End: 2022-03-17

## 2022-03-07 RX ORDER — FAMOTIDINE 20 MG/1
20 TABLET, FILM COATED ORAL 2 TIMES DAILY
COMMUNITY
End: 2022-03-17

## 2022-03-09 ENCOUNTER — TELEPHONE (OUTPATIENT)
Dept: FAMILY MEDICINE CLINIC | Age: 72
End: 2022-03-09

## 2022-03-09 DIAGNOSIS — M79.89 SWELLING OF LEFT HAND: Primary | ICD-10-CM

## 2022-03-09 NOTE — TELEPHONE ENCOUNTER
Attempted to call patient. Left secure voicemail stating form has not been received yet and to call the office back at [phone].

## 2022-03-09 NOTE — TELEPHONE ENCOUNTER
----- Message from James Victoria sent at 3/9/2022  9:11 AM EST -----  Subject: Message to Provider    QUESTIONS  Information for Provider? Patient was seen in the ER on 03/01/222 and has   also seen a Hand Specialist, Dr. Sebastian Maravilla has recommended that the patient   have his uric acid levels checked, ASAP. Please add orders. Patient will   also be having knee replacement 03/29/22, and is see cardiology for   testing.   ---------------------------------------------------------------------------  --------------  [address]  What is the best way for the office to contact you? OK to leave message on   voicemail  Preferred Call Back Phone Number? [phone]  ---------------------------------------------------------------------------  --------------  SCRIPT ANSWERS  Relationship to Patient? Other  Representative Name? [facility], wife  Is the Representative on the appropriate HIPAA document in Epic?  Yes

## 2022-03-09 NOTE — TELEPHONE ENCOUNTER
Called Maddie Idris and confirmed two patient identifiers. Informed Maddie Steinberg that labs can be done during visit next week. She would like to have results back before the visit since they have to go for pre-admissions testing right after their appointment next week. Informed wife I would send message to provider asking to order labs Monday and get them in for a lab appointment so that results are back by day of visit. Also asked wife if a pre-op exam is needed. She stated nothing was mentioned. Advised her to clarify with [facility] and call back if appointment is needed since appointment are sparse. She verbalized understanding.

## 2022-03-11 ENCOUNTER — TELEPHONE (OUTPATIENT)
Dept: CARDIOLOGY CLINIC | Age: 72
End: 2022-03-11

## 2022-03-11 ENCOUNTER — DOCUMENTATION ONLY (OUTPATIENT)
Dept: CARDIOLOGY CLINIC | Age: 72
End: 2022-03-11

## 2022-03-11 NOTE — TELEPHONE ENCOUNTER
Spoke to patient. Enrollment dates were 2-27-22 till 3-12-22. She will sent the monitor back on Monday.

## 2022-03-14 ENCOUNTER — APPOINTMENT (OUTPATIENT)
Dept: FAMILY MEDICINE CLINIC | Age: 72
End: 2022-03-14

## 2022-03-14 DIAGNOSIS — M79.89 SWELLING OF LEFT HAND: ICD-10-CM

## 2022-03-14 LAB — URATE SERPL-MCNC: 4.3 MG/DL (ref 3.5–7.2)

## 2022-03-15 NOTE — PROGRESS NOTES
MD Airam Perez; Martha Terrazas MD 4 days ago     MG    Thanks [facility]   Stress echo normal   Nsvt  noted   May need bbrobel Kingston make sure he has f/up appointment    Message text      TC to pt, wife answered. Appt for next week with NP, they are agreeable to seeing Dr. Matthew Ya instead. Appt made. No further questions.

## 2022-03-17 ENCOUNTER — OFFICE VISIT (OUTPATIENT)
Dept: FAMILY MEDICINE CLINIC | Age: 72
End: 2022-03-17
Payer: MEDICARE

## 2022-03-17 VITALS
DIASTOLIC BLOOD PRESSURE: 70 MMHG | HEIGHT: 72 IN | OXYGEN SATURATION: 98 % | HEART RATE: 72 BPM | BODY MASS INDEX: 24.41 KG/M2 | SYSTOLIC BLOOD PRESSURE: 122 MMHG | WEIGHT: 180.2 LBS | TEMPERATURE: 99.1 F | RESPIRATION RATE: 16 BRPM

## 2022-03-17 DIAGNOSIS — M79.89 SWELLING OF LEFT HAND: ICD-10-CM

## 2022-03-17 DIAGNOSIS — Z00.00 ENCOUNTER FOR MEDICARE ANNUAL WELLNESS EXAM: Primary | ICD-10-CM

## 2022-03-17 DIAGNOSIS — Z01.818 PRE-OP EVALUATION: ICD-10-CM

## 2022-03-17 PROCEDURE — G8427 DOCREV CUR MEDS BY ELIG CLIN: HCPCS | Performed by: FAMILY MEDICINE

## 2022-03-17 PROCEDURE — 99214 OFFICE O/P EST MOD 30 MIN: CPT | Performed by: FAMILY MEDICINE

## 2022-03-17 PROCEDURE — G8420 CALC BMI NORM PARAMETERS: HCPCS | Performed by: FAMILY MEDICINE

## 2022-03-17 PROCEDURE — G8432 DEP SCR NOT DOC, RNG: HCPCS | Performed by: FAMILY MEDICINE

## 2022-03-17 PROCEDURE — 3017F COLORECTAL CA SCREEN DOC REV: CPT | Performed by: FAMILY MEDICINE

## 2022-03-17 PROCEDURE — G0463 HOSPITAL OUTPT CLINIC VISIT: HCPCS | Performed by: FAMILY MEDICINE

## 2022-03-17 PROCEDURE — G8536 NO DOC ELDER MAL SCRN: HCPCS | Performed by: FAMILY MEDICINE

## 2022-03-17 PROCEDURE — G0439 PPPS, SUBSEQ VISIT: HCPCS | Performed by: FAMILY MEDICINE

## 2022-03-17 PROCEDURE — 1101F PT FALLS ASSESS-DOCD LE1/YR: CPT | Performed by: FAMILY MEDICINE

## 2022-03-17 NOTE — PROGRESS NOTES
Patient Name: Flores Guevara   MRN: [id number]    Suri Bailey is a 66 y.o. male who presents with the following:     Pre Op  Procedure: left total knee replacement  Expected Date: 3/29  Surgeon: Dr. Nancy Oakley  Hx of complications with general anesthesia: none  Signs and symptoms of cardiovascular disease:  recently seen by cardiology for syncope. Awaiting to go over results of heart monitor; has appt on 3/24. Have any of the following: CVA, CHF, Cr > 2.0, insulin-dependent DM, ischemic cardiac disease, or suprainguinal vascular/intrathroacic/intraabdominal surgery:  none  Able to meet > 4 METS: yes  STOP-BANG (snoring, tiredness, observed apnea, high BP, BMI>35, age >47, neck circumference> 15 in, male): 3+ risk factors - hx of CASSANDRA     Went to ER due to left hand swelling. Resolved after abx and steroids. Saw ortho who thought it might be pseudogout. Recent uric acid was normal. Pt recalls a similar episode a few years ago which required IV abx treatment. Review of Systems   Constitutional: Negative for fever, malaise/fatigue and weight loss. Respiratory: Negative for cough, hemoptysis, shortness of breath and wheezing. Cardiovascular: Negative for chest pain, palpitations, leg swelling and PND. Gastrointestinal: Negative for abdominal pain, constipation, diarrhea, nausea and vomiting. The patient's medications, allergies, past medical history, surgical history, family history and social history were reviewed and updated where appropriate. Current Outpatient Medications:     latanoprost (XALATAN) 0.005 % ophthalmic solution, , Disp: , Rfl:     memantine ER (NAMENDA XR) 7 mg capsule, TAKE 1 CAPSULE BY MOUTH EVERY DAY FOR 30 DAYS, Disp: , Rfl:     buPROPion XL (WELLBUTRIN XL) 150 mg tablet, Take 1 Tablet by mouth daily. , Disp: 90 Tablet, Rfl: 2    donepeziL (ARICEPT) 10 mg tablet, TAKE 1 TABLET BY MOUTH EVERY DAY AT NIGHT, Disp: 90 Tablet, Rfl: 1    traZODone (DESYREL) 50 mg Patient here for CADD disconnect  Reporting feeling "dizzy", patient reported he has not eaten anything yet today and forgot to take his blood pressure medicine  BP is elevated at 162/79  Patient given cranberry juice to drink and a snack, will monitor for 10 minutes prior to de-accessing port  tablet, Take 1 Tablet by mouth nightly., Disp: 90 Tablet, Rfl: 2    tamsulosin (FLOMAX) 0.4 mg capsule, Take 1 Capsule by mouth daily.  (Patient taking differently: Take 0.4 mg by mouth nightly.), Disp: 90 Capsule, Rfl: 2    No Known Allergies      Past Medical History:   Diagnosis Date    Allergic rhinitis, cause unspecified     Anxiety     Anxiety     Anxiety disorder 2010    AR (allergic rhinitis) 2010    BPH (benign prostatic hyperplasia)     Burning with urination     Degenerative arthritis of lumbar spine     Dementia ([facility] [address]     Depression     Eustachian tube dysfunction     Ill-defined condition     WHITE MATTER DISEASE    Insomnia due to other mental disorder 2021    Mild cognitive impairment 2021    Obstructive sleep apnea syndrome 2021    CPAP    Right inguinal hernia 5/3/2016    White matter abnormality on MRI of brain        Past Surgical History:   Procedure Laterality Date    ENDOSCOPY, COLON, DIAGNOSTIC      repeat     HX APPENDECTOMY           HX GI      COLONOSCOPY    HX HERNIA REPAIR      HX KNEE ARTHROSCOPY      HX ORTHOPAEDIC  10/75    knee repair    HX TONSILLECTOMY      AS A CHILD    MT TOTAL KNEE ARTHROPLASTY Right 2021       Family History   Problem Relation Age of Onset    Heart Disease Mother         CAD s/p CABG 66's    Cancer Father     Alcohol abuse Father     Anesth Problems Neg Hx        Social History     Socioeconomic History    Marital status:      Spouse name: Not on file    Number of children: Not on file    Years of education: Not on file    Highest education level: Not on file   Occupational History    Occupation:  ed   Tobacco Use    Smoking status: Former Smoker     Years: 35.00     Types: Cigars     Quit date:      Years since quittin.2    Smokeless tobacco: Never Used    Tobacco comment: off and on   Vaping Use    Vaping Use: Never used   Substance and Sexual Activity  Alcohol use: Yes     Comment: RARE    Drug use: No    Sexual activity: Yes     Partners: Female   Other Topics Concern     Service No    Blood Transfusions No    Caffeine Concern No    Occupational Exposure No    Hobby Hazards No    Sleep Concern No    Stress Concern No    Weight Concern No    Special Diet No    Back Care No    Exercise Yes     Comment: prn    Bike Helmet No    Seat Belt Yes    Self-Exams Yes   Social History Narrative    Not on file     Social Determinants of Health     Financial Resource Strain:     Difficulty of Paying Living Expenses: Not on file   Food Insecurity:     Worried About Running Out of Food in the Last Year: Not on file    Key of Food in the Last Year: Not on file   Transportation Needs:     Lack of Transportation (Medical): Not on file    Lack of Transportation (Non-Medical):  Not on file   Physical Activity:     Days of Exercise per Week: Not on file    Minutes of Exercise per Session: Not on file   Stress:     Feeling of Stress : Not on file   Social Connections:     Frequency of Communication with Friends and Family: Not on file    Frequency of Social Gatherings with Friends and Family: Not on file    Attends Orthodoxy Services: Not on file    Active Member of [address] [place] [address] or Organizations: Not on file    Attends Club or Organization Meetings: Not on file    Marital Status: Not on file   Intimate Partner Violence:     Fear of Current or Ex-Partner: Not on file    Emotionally Abused: Not on file    Physically Abused: Not on file    Sexually Abused: Not on file   Housing Stability:     Unable to Pay for Housing in the Last Year: Not on file    Number of [place] in the Last Year: Not on file    Unstable Housing in the Last Year: Not on file         OBJECTIVE    Visit Vitals  /70 (BP 1 Location: Left upper arm, BP Patient Position: Sitting, BP Cuff Size: Adult)   Pulse 72   Temp 99.1 °F (37.3 °C) (Temporal)   Resp 16   Ht 6' (1.829 m)   Wt 180 lb 3.2 oz (81.7 kg)   SpO2 98%   BMI 24.44 kg/m²       Physical Exam  Vitals and nursing note reviewed. Constitutional:       General: He is not in acute distress. Appearance: He is not diaphoretic. Eyes:      Conjunctiva/sclera: Conjunctivae normal.      Pupils: Pupils are equal, round, and reactive to light. Cardiovascular:      Rate and Rhythm: Normal rate and regular rhythm. Pulses:           Carotid pulses are 2+ on the right side and 2+ on the left side. Heart sounds: Normal heart sounds. No murmur heard. No friction rub. No gallop. Pulmonary:      Effort: Pulmonary effort is normal. No respiratory distress. Breath sounds: Normal breath sounds. No wheezing. Skin:     General: Skin is warm and dry. Neurological:      Mental Status: He is alert. ASSESSMENT AND PLAN  Uche Souza is a 66 y.o. male who presents today for:    1. Encounter for Medicare annual wellness exam    2. Pre-op evaluation  Risk of cardiac death and nonfatal myocardial infarction for noncardiac surgical procedures:  Intermediate (1-5% risk due to orthopedic procedure). Defer to cardiology for cardiac clearance given recent work up for syncope. Otherwise, he is medically cleared for surgery. [facility].    3. Swelling of left hand  Resolved; discussed that it might be due cellulitis or possible gout (reviewed diagnostic limitations of uric acid; may need joint aspiration by ortho if it reoccurs to rule out septic joint).        Medications Discontinued During This Encounter   Medication Reason    acetaminophen (TYLENOL) 500 mg tablet LIST CLEANUP    cephALEXin (Keflex) 500 mg capsule LIST CLEANUP    famotidine (Pepcid) 20 mg tablet LIST CLEANUP    naproxen (NAPROSYN) 250 mg tablet LIST CLEANUP    predniSONE (DELTASONE) 20 mg tablet LIST CLEANUP           Treatment risks/benefits/costs/interactions/alternatives discussed with patient. Advised patient to call back or return to office if symptoms worsen/change/persist. If patient cannot reach us or should anything more severe/urgent arise he/she should proceed directly to the nearest emergency department. Discussed expected course/resolution/complications of diagnosis in detail with patient. Patient expressed understanding with the diagnosis and plan. This dictation may have been completed with Dragon, the computer voice recognition software. Unanticipated grammatical, syntax, homophones, and other interpretive errors are sometimes inadvertently transcribed by the computer software. Please disregard any errors that have escaped final proofreading. Joseluis Good M.D.

## 2022-03-17 NOTE — PROGRESS NOTES
Chief Complaint   Patient presents with   [facility] Follow Up     gout - [id number] Pre-op Exam       1. \"Have you been to the ER, urgent care clinic since your last visit? Hospitalized since your last visit? \" Yes When: 02/22 Where: 3 [facility] Reason for visit: gout    2. \"Have you seen or consulted any other health care providers outside of the [address] [place] [address] since your last visit? \" No     3. For patients aged 39-70: Has the patient had a colonoscopy / FIT/ Cologuard? No      If the patient is female:    4. For patients aged 41-77: Has the patient had a mammogram within the past 2 years? NA - based on age or sex      11. For patients aged 21-65: Has the patient had a pap smear?  NA - based on age or sex    3 most recent PHQ Screens 2/22/2022   Little interest or pleasure in doing things Not at all   Feeling down, depressed, irritable, or hopeless Not at all   Total Score PHQ 2 0

## 2022-03-17 NOTE — PROGRESS NOTES
This is the Subsequent Medicare Annual Wellness Exam, performed 12 months or more after the Initial AWV or the last Subsequent AWV    I have reviewed the patient's medical history in detail and updated the computerized patient record. Assessment/Plan   Education and counseling provided:  Are appropriate based on today's review and evaluation    1. Encounter for Medicare annual wellness exam  2. Pre-op evaluation  3. Swelling of left hand       Depression Risk Factor Screening     3 most recent PHQ Screens 2/22/2022   Little interest or pleasure in doing things Not at all   Feeling down, depressed, irritable, or hopeless Not at all   Total Score PHQ 2 0       Alcohol & Drug Abuse Risk Screen    Do you average more than 1 drink per night or more than 7 drinks a week: No    In the past three months have you have had more than 4 drinks containing alcohol on one occasion: No          Functional Ability and Level of Safety    Hearing: Hearing is good. Activities of Daily Living: The home contains: no safety equipment. Patient does total self care      Ambulation: with no difficulty     Fall Risk:  Fall Risk Assessment, last 12 mths 9/27/2021   Able to walk? Yes   Fall in past 12 months? 0   Do you feel unsteady? 0   Are you worried about falling 0   Number of falls in past 12 months -   Fall with injury?  -      Abuse Screen:  Patient is not abused       Cognitive Screening    Has your family/caregiver stated any concerns about your memory: yes - hx of MCI; on meds       Health Maintenance Due     Health Maintenance Due   Topic Date Due    DTaP/Tdap/Td series (2 - Td or Tdap) 09/18/2019    Flu Vaccine (1) 09/01/2021    COVID-19 Vaccine (3 - Booster for Dene Calkin series) 11/17/2021    Medicare Yearly Exam  12/10/2021       Patient Care Team   Patient Care Team:  Lynn Zavala MD as PCP - General (Family Medicine)  Lynn Zavala [facility] as PCP [facility]  Mansi Irene MD (Cardiology)  Slava Cabrera MD (Neurology)  Yajaira Weathers MD (General Surgery)  Gloria Simmons MD (Cardiology)    History     Patient Active Problem List   Diagnosis Code    AR (allergic rhinitis) J30.9    Anxiety disorder F41.9    Right inguinal hernia K40.90    ACP (advance care planning) Z71.89    Obstructive sleep apnea syndrome G47.33    Mild cognitive impairment G31.84    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, F99    Degenerative arthritis of lumbar spine M47.816    Eustachian tube dysfunction H69.80     Past Medical History:   Diagnosis Date    Allergic rhinitis, cause unspecified     Anxiety     Anxiety     Anxiety disorder 7/2/2010    AR (allergic rhinitis) 7/2/2010    BPH (benign prostatic hyperplasia)     Burning with urination     Degenerative arthritis of lumbar spine 9/12    Dementia ([facility] [address]     Depression     Eustachian tube dysfunction     Ill-defined condition     WHITE MATTER DISEASE    Insomnia due to other mental disorder 5/19/2021    Mild cognitive impairment 5/19/2021    Obstructive sleep apnea syndrome 5/19/2021    CPAP    Right inguinal hernia 5/3/2016    White matter abnormality on MRI of brain       Past Surgical History:   Procedure Laterality Date    ENDOSCOPY, COLON, DIAGNOSTIC  2007    repeat 2014    HX APPENDECTOMY  1953         HX GI      COLONOSCOPY    HX HERNIA REPAIR  2/85    HX KNEE ARTHROSCOPY  6/02    HX ORTHOPAEDIC  10/75    knee repair    HX TONSILLECTOMY      AS A CHILD    PA TOTAL KNEE ARTHROPLASTY Right 02/2021     Current Outpatient Medications   Medication Sig Dispense Refill    latanoprost (XALATAN) 0.005 % ophthalmic solution       memantine ER (NAMENDA XR) 7 mg capsule TAKE 1 CAPSULE BY MOUTH EVERY DAY FOR 30 DAYS      buPROPion XL (WELLBUTRIN XL) 150 mg tablet Take 1 Tablet by mouth daily.  90 Tablet 2    donepeziL (ARICEPT) 10 mg tablet TAKE 1 TABLET BY MOUTH EVERY DAY AT NIGHT 90 Tablet 1    traZODone (DESYREL) 50 mg tablet Take 1 Tablet by mouth nightly. 90 Tablet 2    tamsulosin (FLOMAX) 0.4 mg capsule Take 1 Capsule by mouth daily.  (Patient taking differently: Take 0.4 mg by mouth nightly.) 90 Capsule 2     No Known Allergies    Family History   Problem Relation Age of Onset    Heart Disease Mother         CAD s/p CABG 66's    Cancer Father     Alcohol abuse Father     Anesth Problems Neg Hx      Social History     Tobacco Use    Smoking status: Former Smoker     Years: 35.00     Types: Cigars     Quit date:      Years since quittin.2    Smokeless tobacco: Never Used    Tobacco comment: off and on   Substance Use Topics    Alcohol use: Yes     Comment: NICK Gomez MD

## 2022-03-18 PROBLEM — G31.84 MILD COGNITIVE IMPAIRMENT: Status: ACTIVE | Noted: 2021-05-19

## 2022-03-19 PROBLEM — F51.05 INSOMNIA DUE TO OTHER MENTAL DISORDER: Status: ACTIVE | Noted: 2021-05-19

## 2022-03-19 PROBLEM — F99 INSOMNIA DUE TO OTHER MENTAL DISORDER: Status: ACTIVE | Noted: 2021-05-19

## 2022-03-20 PROBLEM — G47.33 OBSTRUCTIVE SLEEP APNEA SYNDROME: Status: ACTIVE | Noted: 2021-05-19

## 2022-03-24 ENCOUNTER — OFFICE VISIT (OUTPATIENT)
Dept: CARDIOLOGY CLINIC | Age: 72
End: 2022-03-24
Payer: MEDICARE

## 2022-03-24 VITALS
WEIGHT: 178 LBS | HEART RATE: 70 BPM | HEIGHT: 72 IN | BODY MASS INDEX: 24.11 KG/M2 | DIASTOLIC BLOOD PRESSURE: 80 MMHG | SYSTOLIC BLOOD PRESSURE: 130 MMHG | RESPIRATION RATE: 14 BRPM | OXYGEN SATURATION: 97 %

## 2022-03-24 DIAGNOSIS — G31.84 MILD COGNITIVE IMPAIRMENT: ICD-10-CM

## 2022-03-24 DIAGNOSIS — R55 SYNCOPE, UNSPECIFIED SYNCOPE TYPE: Primary | ICD-10-CM

## 2022-03-24 PROCEDURE — 3017F COLORECTAL CA SCREEN DOC REV: CPT | Performed by: SPECIALIST

## 2022-03-24 PROCEDURE — 1101F PT FALLS ASSESS-DOCD LE1/YR: CPT | Performed by: SPECIALIST

## 2022-03-24 PROCEDURE — G0463 HOSPITAL OUTPT CLINIC VISIT: HCPCS | Performed by: SPECIALIST

## 2022-03-24 PROCEDURE — G8420 CALC BMI NORM PARAMETERS: HCPCS | Performed by: SPECIALIST

## 2022-03-24 PROCEDURE — 99214 OFFICE O/P EST MOD 30 MIN: CPT | Performed by: SPECIALIST

## 2022-03-24 PROCEDURE — G8536 NO DOC ELDER MAL SCRN: HCPCS | Performed by: SPECIALIST

## 2022-03-24 PROCEDURE — G8427 DOCREV CUR MEDS BY ELIG CLIN: HCPCS | Performed by: SPECIALIST

## 2022-03-24 PROCEDURE — G8510 SCR DEP NEG, NO PLAN REQD: HCPCS | Performed by: SPECIALIST

## 2022-03-24 RX ORDER — METOPROLOL SUCCINATE 25 MG/1
12.5 TABLET, EXTENDED RELEASE ORAL DAILY
Qty: 45 TABLET | Refills: 1 | Status: SHIPPED | OUTPATIENT
Start: 2022-03-24 | End: 2022-09-06

## 2022-03-24 NOTE — PROGRESS NOTES
Visit Vitals  /80 (BP 1 Location: Right arm, BP Patient Position: Sitting, BP Cuff Size: Adult)   Pulse 70   Resp 14   Ht 6' (1.829 m)   Wt 178 lb (80.7 kg)   SpO2 97%   BMI 24.14 kg/m²

## 2022-03-24 NOTE — PROGRESS NOTES
[address] [facility]                                                            OFFICE NOTE        Erasmo Crain M.D.,RADHA SONI BROCK   1950  [id number]    Date/Time:  3/24/664200:39 AM            SUBJECTIVE:  He is doing well he has had no recurrent syncopal episode. No chest pain or shortness of breath or palpitations reported. Assessment/Plan    1. Syncope: No recurrent syncope. His blood pressure today is completely normal.  Previous electrocardiogram revealed a normal sinus rhythm with no significant ST-T changes and normal intervals. Discussed results of stress test today which was essentially normal with no ischemia and normal ejection fraction. Discussed also the presence of nonsustained VT on the stress test but also nonsustained VT although brief on event monitor. I do not think that this is the cause of his syncope at this time. Nonetheless I feel that we should start him on a small dose of beta-blocker. Side effects of Toprol been discussed at length. Given the previous orthostatic hypotension I will start him on a very small dose of Toprol-XL 12.5 mg every morning. Given nightly may be a problem in his case because he gets up to go to the restroom several times a night. I will ask him to continue to stay well-hydrated and increase his salt intake. For now no additional interventions cardiac wise. 2.  Preop: At this time the patient is at an acceptable cardiac risk to proceed with the left total knee replacement. Continue Toprol with no interruptions. 3.  BPH: On Flomax. 4.  Memory impairment: On Namenda closely followed by his primary care physician also on Aricept. Otherwise I will see him back in 4 months.         HPI   67years old male with the past medical history remarkable for generalized anxiety disorder, memory impairment, benign prostate hypertrophy who presents today for cardiac evaluation of the syncope.     Past medical history: As above     He denies any history hypertension hyperlipidemia diabetes or coronary disease.     Surgical history: Left total knee replacement inguinal hernia repair     Family history: His mother underwent bypass surgery in her late 62s     Social history: Quit smoking 2 to 3 years ago. He does not drink. There both active with him and his wife walking apparently daily. CARDIAC STUDIES        03/07/22    ECHO STRESS 03/07/2022 3/7/2022    Interpretation Summary    Study Impression: The study is normal.    Post-stress Echo: The post-stress echo showed no new wall motion abnormalities.   Left Ventricle: Left ventricle size is normal. Normal wall thickness. Normal wall motion. Normal left ventricular systolic function. Normal diastolic function.   ECG: Stress ECG was negative for ischemia. There was a brief asymptomatic episode of NSVT.   ECG: Resting ECG demonstrates normal sinus rhythm.   Stress Test: A Urban protocol stress test was performed. Hemodynamics are adequate for diagnosis. Blood pressure demonstrated a normal response and heart rate demonstrated a normal response to stress. Overall, the patient's exercise capacity was average for their age. The patient reached stage 2 of the protocol after exercising for 3 min and 54 sec. Signed by: Ana Madrigal MD on 3/7/2022  2:02 PM                              EKG Results     None              IMAGING      MRI Results (most recent):  Results from [place] encounter on 06/01/21    MRI BRAIN WO CONT    Narrative  INDICATION: Left trigeminal myalgia    EXAMINATION:  MRI BRAIN WO CONTRAST    COMPARISON:  November 14, 2017    TECHNIQUE:  Multiplanar multisequence acquisition without contrast of the brain. Noncontrast axial coronal thin slice imaging through the skull base performed.   Imaging was performed on an open MRI.    FINDINGS:    Ventricles:  Midline, no hydrocephalus. Brain Parenchyma/Brainstem:  Mild generalized atrophy. Mild chronic T2  hyperintensities throughout the supratentorial white matter and kirsten. No acute  infarction. Intracranial Hemorrhage:  None. Basal Cisterns:  Normal. No definite cranial nerve abnormality allowing for lack  of IV contrast.  Flow Voids:  Normal.  Additional Comments:  Heterogeneous T2 signal throughout the left maxillary,  ethmoid, and frontal sinuses. Less significant mucosal thickening in the right  frontal, ethmoid and maxillary sinuses. Impression  1. Generalized atrophy and chronic white matter disease as above, with no acute  process. 2. No definite cranial nerve or skull base abnormality allowing for lack of IV  contrast material.  3. Paranasal sinus disease left greater than right. CT Results (most recent):  Results from Hospital Encounter encounter on 09/09/21    CT ABD PELV W CONT    Narrative  CLINICAL HISTORY: Weight loss  INDICATION: Weight loss  COMPARISON: None. CONTRAST: 100  ml Isovue 370  TECHNIQUE:  Multislice helical CT was performed of the abdomen and pelvis following  uneventful rapid bolus intravenous contrast administration. Oral contrast was  not administered. Contiguous 5 mm axial images were reconstructed and lung and  soft tissue windows were generated. Coronal and sagittal reformations were  generated. CT dose reduction was achieved through use of a standardized  protocol tailored for this examination and automatic exposure control for dose  modulation. FINDINGS:  LUNG BASES:No mass lesion or effusion. LIVER: Tiny hepatic hypodensities are most likely simple cysts. There is no  intrahepatic duct dilatation. There is no hepatic parenchymal mass. Hepatic  enhancement pattern is within normal limits. Portal vein is patent. GALLBLADDER:  No dilatation or wall thickening. SPLEEN/PANCREAS: No mass. There is no pancreatic duct dilatation.  There is no  evidence of splenomegaly. ADRENALS/KIDNEYS: No mass. There is no hydronephrosis. There is no renal mass. There is no perinephric mass. STOMACH: No dilatation or wall thickening. COLON AND SMALL BOWEL: Fecal stasis. Colonic diverticulosis. There is no free  intraperitoneal air. There is no evidence of incarceration or obstruction. No  mesenteric adenopathy. PERITONEUM: Inguinal hernia on the right contains loops of small bowel. No  incarceration or obstruction. APPENDIX: Unremarkable. BLADDER/REPRODUCTIVE ORGANS: Enlarged prostate gland. The bladder is  unremarkable. RETROPERITONEUM: Atherosclerotic change is minimal. The abdominal aorta is  normal in caliber. No aneurysm. No retroperitoneal adenopathy. OSSEOUS STRUCTURES: No destructive bone lesion. Impression  No acute intraperitoneal process is identified. Significantly enlarged prostate gland. Colonic diverticulosis without evidence of diverticulitis. Hepatic cysts. Small bowel containing right inguinal hernia without incarceration or  obstruction. Please see above for additional nonemergent incidental findings. XR Results (most recent):  Results from Hospital Encounter encounter on 09/09/21    XR CHEST PA LAT    Narrative  INDICATION: Abnormal weight loss. Additional history:  COMPARISON: Previous chest xray, 8/31/2019. Trula Blew FINDINGS: PA and lateral view of the chest.  .  Lines/tubes/surgical: None. Heart/mediastinum: Unremarkable. Lungs/pleura:  No focal consolidation or mass. No visualized pleural effusion or  pneumothorax. Additional Comments: Slight kyphosis and degenerative changes in the spine. .    Impression  1. No radiographic evidence of acute cardiopulmonary disease.           Past Medical History:   Diagnosis Date    Allergic rhinitis, cause unspecified     Anxiety     Anxiety     Anxiety disorder 7/2/2010    AR (allergic rhinitis) 7/2/2010    BPH (benign prostatic hyperplasia)     Burning with urination  Degenerative arthritis of lumbar spine     Dementia ([facility] [address]     Depression     Eustachian tube dysfunction     Ill-defined condition     WHITE MATTER DISEASE    Insomnia due to other mental disorder 2021    Mild cognitive impairment 2021    Obstructive sleep apnea syndrome 2021    CPAP    Right inguinal hernia 5/3/2016    White matter abnormality on MRI of brain      Past Surgical History:   Procedure Laterality Date    ENDOSCOPY, COLON, DIAGNOSTIC      repeat     HX APPENDECTOMY           HX GI      COLONOSCOPY    HX HERNIA REPAIR      HX KNEE ARTHROSCOPY      HX ORTHOPAEDIC  10/75    knee repair    HX TONSILLECTOMY      AS A CHILD    MO TOTAL KNEE ARTHROPLASTY Right 2021     Social History     Tobacco Use    Smoking status: Former Smoker     Years: 35.00     Types: Cigars     Quit date:      Years since quittin.2    Smokeless tobacco: Never Used    Tobacco comment: off and on   Vaping Use    Vaping Use: Never used   Substance Use Topics    Alcohol use: Yes     Comment: RARE    Drug use: No     Family History   Problem Relation Age of Onset    Heart Disease Mother         CAD s/p CABG 66's    Cancer Father     Alcohol abuse Father     Anesth Problems Neg Hx      No Known Allergies      Visit Vitals  /80 (BP 1 Location: Right arm, BP Patient Position: Sitting, BP Cuff Size: Adult)   Pulse 70   Resp 14   Ht 6' (1.829 m)   Wt 178 lb (80.7 kg)   SpO2 97%   BMI 24.14 kg/m²         Last 3 Recorded Weights in this Encounter    22 1022   Weight: 178 lb (80.7 kg)            Review of Systems:   Pertinent items are noted in the History of Present Illness.        Visit Vitals  /80 (BP 1 Location: Right arm, BP Patient Position: Sitting, BP Cuff Size: Adult)   Pulse 70   Resp 14   Ht 6' (1.829 m)   Wt 178 lb (80.7 kg)   SpO2 97%   BMI 24.14 kg/m²     General Appearance:  Well developed, well nourished,alert and oriented x 3, and individual in no acute distress. Ears/Nose/Mouth/Throat:   Hearing grossly normal.         Neck: Supple. Chest:   Lungs clear to auscultation bilaterally. Cardiovascular:  Regular rate and rhythm, S1, S2 normal, no murmur. Abdomen:   Soft, non-tender, bowel sounds are active. Extremities: No edema bilaterally. Skin: Warm and dry. Current Outpatient Medications on File Prior to Visit   Medication Sig Dispense Refill    latanoprost (XALATAN) 0.005 % ophthalmic solution       memantine ER (NAMENDA XR) 7 mg capsule TAKE 1 CAPSULE BY MOUTH EVERY DAY FOR 30 DAYS      buPROPion XL (WELLBUTRIN XL) 150 mg tablet Take 1 Tablet by mouth daily. 90 Tablet 2    donepeziL (ARICEPT) 10 mg tablet TAKE 1 TABLET BY MOUTH EVERY DAY AT NIGHT 90 Tablet 1    traZODone (DESYREL) 50 mg tablet Take 1 Tablet by mouth nightly. 90 Tablet 2    tamsulosin (FLOMAX) 0.4 mg capsule Take 1 Capsule by mouth daily. (Patient taking differently: Take 0.4 mg by mouth nightly.) 90 Capsule 2     No current facility-administered medications on file prior to visit. Renetta YaraAric Gigi had no medications administered during this visit. Current Outpatient Medications   Medication Sig    latanoprost (XALATAN) 0.005 % ophthalmic solution     memantine ER (NAMENDA XR) 7 mg capsule TAKE 1 CAPSULE BY MOUTH EVERY DAY FOR 30 DAYS    buPROPion XL (WELLBUTRIN XL) 150 mg tablet Take 1 Tablet by mouth daily.  donepeziL (ARICEPT) 10 mg tablet TAKE 1 TABLET BY MOUTH EVERY DAY AT NIGHT    traZODone (DESYREL) 50 mg tablet Take 1 Tablet by mouth nightly.  tamsulosin (FLOMAX) 0.4 mg capsule Take 1 Capsule by mouth daily. (Patient taking differently: Take 0.4 mg by mouth nightly.)     No current facility-administered medications for this visit.          Lab Results   Component Value Date/Time    Cholesterol, total 167 08/18/2020 03:08 PM    HDL Cholesterol 63 08/18/2020 03:08 PM    LDL, calculated 90 08/18/2020 03:08 PM    VLDL, calculated 14 08/18/2020 03:08 PM    Triglyceride 72 08/18/2020 03:08 PM       Lab Results   Component Value Date/Time    Sodium 138 08/24/2021 11:16 AM    Potassium 4.2 08/24/2021 11:16 AM    Chloride 107 08/24/2021 11:16 AM    CO2 28 08/24/2021 11:16 AM    Anion gap 3 (L) 08/24/2021 11:16 AM    Glucose 92 08/24/2021 11:16 AM    BUN 15 08/24/2021 11:16 AM    Creatinine 0.97 08/24/2021 11:16 AM    BUN/Creatinine ratio 15 08/24/2021 11:16 AM    GFR est AA >60 08/24/2021 11:16 AM    GFR est non-AA >60 08/24/2021 11:16 AM    Calcium 9.3 08/24/2021 11:16 AM       Lab Results   Component Value Date/Time    ALT (SGPT) 21 08/24/2021 11:16 AM    Alk.  phosphatase 94 08/24/2021 11:16 AM    Bilirubin, total 0.9 08/24/2021 11:16 AM       Lab Results   Component Value Date/Time    WBC 6.7 08/24/2021 11:16 AM    HGB 15.0 08/24/2021 11:16 AM    HCT 48.7 08/24/2021 11:16 AM    PLATELET 005 91/64/1916 11:16 AM    MCV 99.4 (H) 08/24/2021 11:16 AM       Lab Results   Component Value Date/Time    TSH 1.42 08/24/2021 11:16 AM         Lab Results   Component Value Date/Time    Cholesterol, total 167 08/18/2020 03:08 PM    Cholesterol, total 158 10/30/2019 11:29 AM    Cholesterol, total 161 10/17/2018 12:17 PM    Cholesterol, total 176 09/26/2017 12:00 PM    Cholesterol, total 150 05/03/2016 09:40 AM    HDL Cholesterol 63 08/18/2020 03:08 PM    HDL Cholesterol 54 10/30/2019 11:29 AM    HDL Cholesterol 56 10/17/2018 12:17 PM    HDL Cholesterol 60 09/26/2017 12:00 PM    HDL Cholesterol 47 05/03/2016 09:40 AM    LDL, calculated 90 08/18/2020 03:08 PM    LDL, calculated 80 10/30/2019 11:29 AM    LDL, calculated 86 10/17/2018 12:17 PM    LDL, calculated 97 09/26/2017 12:00 PM    LDL, calculated 86 05/03/2016 09:40 AM    Triglyceride 72 08/18/2020 03:08 PM    Triglyceride 119 10/30/2019 11:29 AM    Triglyceride 96 10/17/2018 12:17 PM    Triglyceride 94 09/26/2017 12:00 PM    Triglyceride 87 05/03/2016 09:40 AM                Please note that this dictation was completed with [employer], the [employer] voice recognition software. Quite often unanticipated grammatical, syntax, homophones, and other interpretative errors are inadvertently transcribed by the computer software. Please disregard these errors. Please excuse any errors that have escaped final proofreading.

## 2022-04-14 ENCOUNTER — TELEPHONE (OUTPATIENT)
Dept: FAMILY MEDICINE CLINIC | Age: 72
End: 2022-04-14

## 2022-04-14 NOTE — TELEPHONE ENCOUNTER
Called stated they are following patient with physical therapy and would like for provider to call.     Requesting a call back    Best call back #[phone]

## 2022-04-19 ENCOUNTER — OFFICE VISIT (OUTPATIENT)
Dept: FAMILY MEDICINE CLINIC | Age: 72
End: 2022-04-19
Payer: MEDICARE

## 2022-04-19 VITALS
OXYGEN SATURATION: 96 % | DIASTOLIC BLOOD PRESSURE: 66 MMHG | TEMPERATURE: 98 F | BODY MASS INDEX: 24.11 KG/M2 | WEIGHT: 178 LBS | SYSTOLIC BLOOD PRESSURE: 128 MMHG | RESPIRATION RATE: 16 BRPM | HEIGHT: 72 IN | HEART RATE: 88 BPM

## 2022-04-19 DIAGNOSIS — Z96.652 S/P TOTAL KNEE REPLACEMENT, LEFT: Primary | ICD-10-CM

## 2022-04-19 PROCEDURE — G8536 NO DOC ELDER MAL SCRN: HCPCS | Performed by: FAMILY MEDICINE

## 2022-04-19 PROCEDURE — 1101F PT FALLS ASSESS-DOCD LE1/YR: CPT | Performed by: FAMILY MEDICINE

## 2022-04-19 PROCEDURE — G8432 DEP SCR NOT DOC, RNG: HCPCS | Performed by: FAMILY MEDICINE

## 2022-04-19 PROCEDURE — 3017F COLORECTAL CA SCREEN DOC REV: CPT | Performed by: FAMILY MEDICINE

## 2022-04-19 PROCEDURE — G8427 DOCREV CUR MEDS BY ELIG CLIN: HCPCS | Performed by: FAMILY MEDICINE

## 2022-04-19 PROCEDURE — G0463 HOSPITAL OUTPT CLINIC VISIT: HCPCS | Performed by: FAMILY MEDICINE

## 2022-04-19 PROCEDURE — G8420 CALC BMI NORM PARAMETERS: HCPCS | Performed by: FAMILY MEDICINE

## 2022-04-19 PROCEDURE — 99213 OFFICE O/P EST LOW 20 MIN: CPT | Performed by: FAMILY MEDICINE

## 2022-04-19 RX ORDER — DOCUSATE SODIUM 100 MG/1
100 CAPSULE, LIQUID FILLED ORAL DAILY
COMMUNITY
Start: 2022-04-10

## 2022-04-19 RX ORDER — TRAMADOL HYDROCHLORIDE 50 MG/1
TABLET ORAL
COMMUNITY
Start: 2022-04-18

## 2022-04-19 NOTE — PROGRESS NOTES
Patient Name: Vidal Christina   MRN: [id number]    Jerry Jeans is a 67 y.o. male who presents with the following:     S/p left TKR by Dr. Ajith Pitts. Was admitted to [address] [place] [address] then discharged to [address] [place] [address]. Now at home with [facility] PT/OT. Doing well. Review of Systems   Constitutional: Negative for fever, malaise/fatigue and weight loss. Respiratory: Negative for cough, hemoptysis, shortness of breath and wheezing. Cardiovascular: Negative for chest pain, palpitations, leg swelling and PND. Gastrointestinal: Negative for abdominal pain, constipation, diarrhea, nausea and vomiting. The patient's medications, allergies, past medical history, surgical history, family history and social history were reviewed and updated where appropriate. Current Outpatient Medications:     docusate sodium (COLACE) 100 mg capsule, Take 100 mg by mouth daily. , Disp: , Rfl:     traMADoL (ULTRAM) 50 mg tablet, , Disp: , Rfl:     metoprolol succinate (TOPROL-XL) 25 mg XL tablet, Take 0.5 Tablets by mouth daily. , Disp: 45 Tablet, Rfl: 1    latanoprost (XALATAN) 0.005 % ophthalmic solution, , Disp: , Rfl:     memantine ER (NAMENDA XR) 7 mg capsule, TAKE 1 CAPSULE BY MOUTH EVERY DAY FOR 30 DAYS, Disp: , Rfl:     buPROPion XL (WELLBUTRIN XL) 150 mg tablet, Take 1 Tablet by mouth daily. , Disp: 90 Tablet, Rfl: 2    donepeziL (ARICEPT) 10 mg tablet, TAKE 1 TABLET BY MOUTH EVERY DAY AT NIGHT, Disp: 90 Tablet, Rfl: 1    traZODone (DESYREL) 50 mg tablet, Take 1 Tablet by mouth nightly., Disp: 90 Tablet, Rfl: 2    tamsulosin (FLOMAX) 0.4 mg capsule, Take 1 Capsule by mouth daily.  (Patient taking differently: Take 0.4 mg by mouth nightly.), Disp: 90 Capsule, Rfl: 2    No Known Allergies      OBJECTIVE    Visit Vitals  /66 (BP 1 Location: Left upper arm, BP Patient Position: Sitting, BP Cuff Size: Adult)   Pulse 88   Temp 98 °F (36.7 °C) (Temporal)   Resp 16   Ht 6' (1.829 m)   Wt 178 lb (80.7 kg)   SpO2 96%   BMI 24.14 kg/m²       Physical Exam  Vitals and nursing note reviewed. Constitutional:       General: He is not in acute distress. Appearance: Normal appearance. He is not toxic-appearing. Comments: With walker   HENT:      Head: Normocephalic and atraumatic. Eyes:      Pupils: Pupils are equal, round, and reactive to light. Pulmonary:      Effort: Pulmonary effort is normal.   Musculoskeletal:         General: Normal range of motion. Skin:     General: Skin is warm and dry. Neurological:      Mental Status: He is alert. Mental status is at baseline. Psychiatric:         Mood and Affect: Mood normal.         Behavior: Behavior normal.           ASSESSMENT AND PLAN  Esau Lugo is a 67 y.o. male who presents today for:    1. S/P total knee replacement, left  Doing well. Continue HH PT/OT and f/u with ortho. There are no discontinued medications. Treatment risks/benefits/costs/interactions/alternatives discussed with patient. Advised patient to call back or return to office if symptoms worsen/change/persist. If patient cannot reach us or should anything more severe/urgent arise he/she should proceed directly to the nearest emergency department. Discussed expected course/resolution/complications of diagnosis in detail with patient. Patient expressed understanding with the diagnosis and plan. This dictation may have been completed with Dragon, the computer voice recognition software. Unanticipated grammatical, syntax, homophones, and other interpretive errors are sometimes inadvertently transcribed by the computer software. Please disregard any errors that have escaped final proofreading. Joseluis Rand M.D.

## 2022-04-19 NOTE — PROGRESS NOTES
Chief Complaint   Patient presents with   [facility]     follow up        1. \"Have you been to the ER, urgent care clinic since your last visit? Hospitalized since your last visit? \" Yes When: 03/29 [facility]: [facility]  Reason for visit: surgery    2. \"Have you seen or consulted any other health care providers outside of the [address] [place] [address] since your last visit? \" Yes When: 04/01-10 Where: [facility] Reason for visit: PT      3. For patients aged 39-70: Has the patient had a colonoscopy / FIT/ Cologuard? Yes - no Care Gap present      If the patient is female:    4. For patients aged 41-77: Has the patient had a mammogram within the past 2 years? NA - based on age or sex      11. For patients aged 21-65: Has the patient had a pap smear?  NA - based on age or sex    3 most recent PHQ Screens 3/24/2022   Little interest or pleasure in doing things Not at all   Feeling down, depressed, irritable, or hopeless Not at all   Total Score PHQ 2 0

## 2022-04-22 ENCOUNTER — OFFICE VISIT (OUTPATIENT)
Dept: FAMILY MEDICINE CLINIC | Age: 72
End: 2022-04-22
Payer: MEDICARE

## 2022-04-22 VITALS
DIASTOLIC BLOOD PRESSURE: 64 MMHG | SYSTOLIC BLOOD PRESSURE: 104 MMHG | HEIGHT: 72 IN | OXYGEN SATURATION: 97 % | HEART RATE: 82 BPM | RESPIRATION RATE: 18 BRPM | WEIGHT: 172.2 LBS | TEMPERATURE: 98.7 F | BODY MASS INDEX: 23.32 KG/M2

## 2022-04-22 DIAGNOSIS — J06.9 ACUTE URI: Primary | ICD-10-CM

## 2022-04-22 DIAGNOSIS — D17.1 LIPOMA OF CHEST WALL: ICD-10-CM

## 2022-04-22 PROCEDURE — G8427 DOCREV CUR MEDS BY ELIG CLIN: HCPCS | Performed by: NURSE PRACTITIONER

## 2022-04-22 PROCEDURE — 99213 OFFICE O/P EST LOW 20 MIN: CPT | Performed by: NURSE PRACTITIONER

## 2022-04-22 PROCEDURE — G8432 DEP SCR NOT DOC, RNG: HCPCS | Performed by: NURSE PRACTITIONER

## 2022-04-22 PROCEDURE — G8420 CALC BMI NORM PARAMETERS: HCPCS | Performed by: NURSE PRACTITIONER

## 2022-04-22 PROCEDURE — G8536 NO DOC ELDER MAL SCRN: HCPCS | Performed by: NURSE PRACTITIONER

## 2022-04-22 PROCEDURE — 1101F PT FALLS ASSESS-DOCD LE1/YR: CPT | Performed by: NURSE PRACTITIONER

## 2022-04-22 PROCEDURE — 3017F COLORECTAL CA SCREEN DOC REV: CPT | Performed by: NURSE PRACTITIONER

## 2022-04-22 PROCEDURE — G0463 HOSPITAL OUTPT CLINIC VISIT: HCPCS | Performed by: NURSE PRACTITIONER

## 2022-04-22 RX ORDER — AMOXICILLIN 500 MG/1
CAPSULE ORAL
COMMUNITY
Start: 2022-04-18

## 2022-04-22 RX ORDER — AZITHROMYCIN 250 MG/1
TABLET, FILM COATED ORAL
Qty: 6 TABLET | Refills: 0 | Status: SHIPPED | OUTPATIENT
Start: 2022-04-22 | End: 2022-04-27

## 2022-04-22 NOTE — PROGRESS NOTES
Chief Complaint   Patient presents with    Mass     left side    Other     Patient wants MRI for brain     1. Have you been to the ER, urgent care clinic since your last visit? Hospitalized since your last visit? Yes When: gout, [place] doctor    2. Have you seen or consulted any other health care providers outside of the [address] [place] [address] since your last visit? Include any pap smears or colon screening. Yes When: 02/22.  tulio asencio

## 2022-04-22 NOTE — PROGRESS NOTES
[facility] Note  Subjective:      Frederic Richmond is a 67 y.o. male who presents for head congestion and yellow mucus for several weeks. He has history of allergic rhinitis and wife believes that his allergies are getting worse. He has appointment with allergist for allergy testing . She is requesting to repeat his head MRI, to see progression of his disease. He is followed by a neurologist and last MRI was done May 2021  C/O lump in his side , no pain    Past Medical History:   Diagnosis Date    Allergic rhinitis, cause unspecified     Anxiety     Anxiety     Anxiety disorder 7/2/2010    AR (allergic rhinitis) 7/2/2010    BPH (benign prostatic hyperplasia)     Burning with urination     Degenerative arthritis of lumbar spine 9/12    Dementia ([facility] [address]     Depression     Eustachian tube dysfunction     Ill-defined condition     WHITE MATTER DISEASE    Insomnia due to other mental disorder 5/19/2021    Mild cognitive impairment 5/19/2021    Obstructive sleep apnea syndrome 5/19/2021    CPAP    Right inguinal hernia 5/3/2016    White matter abnormality on MRI of brain      Past Surgical History:   Procedure Laterality Date    ENDOSCOPY, COLON, DIAGNOSTIC  2007    repeat 2014    HX APPENDECTOMY  1953         HX GI      COLONOSCOPY    HX HERNIA REPAIR  2/85    HX KNEE ARTHROSCOPY  6/02    HX ORTHOPAEDIC  10/75    knee repair    HX TONSILLECTOMY      AS A CHILD    HI TOTAL KNEE ARTHROPLASTY Right 02/2021    HI TOTAL KNEE ARTHROPLASTY Left 03/29/2022       Current Outpatient Medications   Medication Sig Dispense Refill    amoxicillin (AMOXIL) 500 mg capsule 4 tabs po 1 hour prior to procedure or dental visit.  azithromycin (ZITHROMAX) 250 mg tablet Take 2 tablets today, then take 1 tablet daily 6 Tablet 0    docusate sodium (COLACE) 100 mg capsule Take 100 mg by mouth daily.       traMADoL (ULTRAM) 50 mg tablet       metoprolol succinate (TOPROL-XL) 25 mg XL tablet Take 0.5 Tablets by mouth daily. 45 Tablet 1    latanoprost (XALATAN) 0.005 % ophthalmic solution       memantine ER (NAMENDA XR) 7 mg capsule TAKE 1 CAPSULE BY MOUTH EVERY DAY FOR 30 DAYS      buPROPion XL (WELLBUTRIN XL) 150 mg tablet Take 1 Tablet by mouth daily. 90 Tablet 2    donepeziL (ARICEPT) 10 mg tablet TAKE 1 TABLET BY MOUTH EVERY DAY AT NIGHT 90 Tablet 1    traZODone (DESYREL) 50 mg tablet Take 1 Tablet by mouth nightly. 90 Tablet 2    tamsulosin (FLOMAX) 0.4 mg capsule Take 1 Capsule by mouth daily. (Patient taking differently: Take 0.4 mg by mouth nightly.) 90 Capsule 2     No Known Allergies    ROS:   Complete review of systems was reviewed with pertinent information listed in HPI. Review of Systems   Constitutional: Negative. HENT: Positive for congestion and sinus pain. Respiratory: Negative. Cardiovascular: Negative. Gastrointestinal: Negative. Genitourinary: Negative. Musculoskeletal: Negative. Objective:     Visit Vitals  /64 (BP 1 Location: Left arm, BP Patient Position: Sitting, BP Cuff Size: Adult)   Pulse 82   Temp 98.7 °F (37.1 °C) (Temporal)   Resp 18   Ht 6' (1.829 m)   Wt 172 lb 3.2 oz (78.1 kg)   SpO2 97%   BMI 23.35 kg/m²       Vitals and Nurse Documentation reviewed. Physical Exam  Constitutional:       Appearance: Normal appearance. Cardiovascular:      Rate and Rhythm: Normal rate and regular rhythm. Pulses: Normal pulses. Heart sounds: Normal heart sounds. No murmur heard. Pulmonary:      Effort: Pulmonary effort is normal.      Breath sounds: Normal breath sounds. Abdominal:      General: Bowel sounds are normal.      Palpations: Abdomen is soft. Musculoskeletal:      Cervical back: Normal range of motion and neck supple. Comments: Walking with cane   Skin:     Comments: Lipoma in left side of his upper back   Neurological:      Mental Status: He is alert.          Assessment/Plan:     Diagnoses and all orders for this visit:    1. Acute URI  -     azithromycin (ZITHROMAX) 250 mg tablet; Take 2 tablets today, then take 1 tablet daily        -     Don't take any antiallergy medication until after your allegory testing    2.  Lipoma of chest wall  Advised wife this is benign , she didn't want it to beremoved    Advised to follow up with his neurologist for MRI order    Pt expressed understanding with the diagnosis and plan        Discussed expected course/resolution/complications of diagnosis in detail with patient.    Medication risks/benefits/costs/interactions/alternatives discussed with patient.    Pt was given an after visit summary which includes diagnoses, current medications & vitals.  Pt expressed understanding with the diagnosis and plan

## 2022-04-29 RX ORDER — AZITHROMYCIN 250 MG/1
TABLET, FILM COATED ORAL
Qty: 6 TABLET | Refills: 0 | Status: SHIPPED | OUTPATIENT
Start: 2022-04-29

## 2022-05-05 ENCOUNTER — TELEPHONE (OUTPATIENT)
Dept: FAMILY MEDICINE CLINIC | Age: 72
End: 2022-05-05

## 2022-05-05 NOTE — TELEPHONE ENCOUNTER
----- Message from Bob Scanlon sent at 5/5/2022  3:21 PM EDT -----  Subject: Message to Provider    QUESTIONS  Information for Provider? Patient request call back. Patient still has   bloody discharge from nose and would like a referral for a ENT  ---------------------------------------------------------------------------  --------------  [address]  What is the best way for the office to contact you? OK to leave message on   voicemail  Preferred Call Back Phone Number? [phone]  ---------------------------------------------------------------------------  --------------  SCRIPT ANSWERS  Relationship to Patient? Other  Representative Name? Rhea soria  Is the Representative on the appropriate HIPAA document in Epic?  Yes

## 2022-06-08 DIAGNOSIS — N40.1 BENIGN PROSTATIC HYPERPLASIA WITH NOCTURIA: ICD-10-CM

## 2022-06-08 DIAGNOSIS — R35.1 BENIGN PROSTATIC HYPERPLASIA WITH NOCTURIA: ICD-10-CM

## 2022-06-09 RX ORDER — TAMSULOSIN HYDROCHLORIDE 0.4 MG/1
CAPSULE ORAL
Qty: 90 CAPSULE | Refills: 2 | Status: SHIPPED | OUTPATIENT
Start: 2022-06-09

## 2022-07-05 DIAGNOSIS — G31.84 MILD COGNITIVE IMPAIRMENT: ICD-10-CM

## 2022-07-08 RX ORDER — DONEPEZIL HYDROCHLORIDE 10 MG/1
TABLET, FILM COATED ORAL
Qty: 90 TABLET | Refills: 1 | Status: SHIPPED | OUTPATIENT
Start: 2022-07-08

## 2022-07-08 NOTE — TELEPHONE ENCOUNTER
Pt wife stated pharmacy has switched to different location [facility], not the [address] [place] [address] Se.  Can we change? 07/08/22JUNE Alberts pt)

## 2022-07-08 NOTE — TELEPHONE ENCOUNTER
Attempted to call patient's wife, Meghan Cohen. Left secure voicemail stating that she can call the new pharmacy and have the rx transferred.

## 2022-07-08 NOTE — TELEPHONE ENCOUNTER
Pt wife Frank Pantoja) left a voice message requesting a refill. [phone]      Last visit 04/22/2022 NP [address]   Next appointment Nothing scheduled   Previous refill encounter(s)   01/12/2022 Aricept #90 with 1 refill.      For Pharmacy Admin Tracking Only     Intervention Detail: New Rx: 1, reason: Patient Preference   Time Spent (min): 5        Requested Prescriptions     Pending Prescriptions Disp Refills    donepeziL (ARICEPT) 10 mg tablet [Pharmacy Med Name: DONEPEZIL HCL 10 MG TABLET] 90 Tablet 0     Sig: TAKE 1 TABLET BY MOUTH EVERY DAY AT NIGHT

## 2022-08-04 ENCOUNTER — OFFICE VISIT (OUTPATIENT)
Dept: CARDIOLOGY CLINIC | Age: 72
End: 2022-08-04
Payer: MEDICARE

## 2022-08-04 VITALS
WEIGHT: 179 LBS | BODY MASS INDEX: 24.24 KG/M2 | HEART RATE: 70 BPM | HEIGHT: 72 IN | SYSTOLIC BLOOD PRESSURE: 120 MMHG | RESPIRATION RATE: 14 BRPM | DIASTOLIC BLOOD PRESSURE: 80 MMHG | OXYGEN SATURATION: 96 %

## 2022-08-04 DIAGNOSIS — R55 SYNCOPE, UNSPECIFIED SYNCOPE TYPE: Primary | ICD-10-CM

## 2022-08-04 PROCEDURE — 99214 OFFICE O/P EST MOD 30 MIN: CPT | Performed by: SPECIALIST

## 2022-08-04 PROCEDURE — 1123F ACP DISCUSS/DSCN MKR DOCD: CPT | Performed by: SPECIALIST

## 2022-08-04 PROCEDURE — G8536 NO DOC ELDER MAL SCRN: HCPCS | Performed by: SPECIALIST

## 2022-08-04 PROCEDURE — 3017F COLORECTAL CA SCREEN DOC REV: CPT | Performed by: SPECIALIST

## 2022-08-04 PROCEDURE — G8510 SCR DEP NEG, NO PLAN REQD: HCPCS | Performed by: SPECIALIST

## 2022-08-04 PROCEDURE — 1101F PT FALLS ASSESS-DOCD LE1/YR: CPT | Performed by: SPECIALIST

## 2022-08-04 PROCEDURE — G8420 CALC BMI NORM PARAMETERS: HCPCS | Performed by: SPECIALIST

## 2022-08-04 PROCEDURE — 93000 ELECTROCARDIOGRAM COMPLETE: CPT | Performed by: SPECIALIST

## 2022-08-04 PROCEDURE — G8427 DOCREV CUR MEDS BY ELIG CLIN: HCPCS | Performed by: SPECIALIST

## 2022-08-04 RX ORDER — MEMANTINE HYDROCHLORIDE 10 MG/1
1 TABLET ORAL 2 TIMES DAILY
COMMUNITY
Start: 2022-05-27

## 2022-08-04 RX ORDER — QUETIAPINE FUMARATE 25 MG/1
1 TABLET, FILM COATED ORAL DAILY
COMMUNITY
Start: 2022-07-05

## 2022-08-04 NOTE — PROGRESS NOTES
[facility]                                                            OFFICE NOTE        Mari Casas M.D.,RADHA SONI BROCK   1950  [id number]    Date/Time:  8/4/202210:18 AM            SUBJECTIVE:  No recurrent syncope doing well   No cp or sob or palpitations   Still some memory issues       Assessment/Plan  1. Syncope: No recurrent syncope. His blood pressure today is completely normal.  electrocardiogram reveales a normal sinus rhythm with no significant ST-T changes and normal intervals. Stress echo on 3/22 was negatve     Discussed also the presence of nonsustained VT on the stress test but also nonsustained VT although brief on event monitor. I do not think that this is the cause of his syncope at this time. Continue small dose of toprol     Side effects of Toprol been discussed at length.      stay well-hydrated and increase his salt intake. For now no additional interventions cardiac wise. 2.  Postop:did well postop for his LTKR     3. BPH: On Flomax. 4.  Memory impairment: closely followed by his primary care physician also on Aricept. Otherwise I will see him back in 6 months        HPI   67years old male with the past medical history remarkable for generalized anxiety disorder, memory impairment, benign prostate hypertrophy who presents today for cardiac evaluation of the syncope. Past medical history: As above     He denies any history hypertension hyperlipidemia diabetes or coronary disease. Surgical history: Left total knee replacement inguinal hernia repair     Family history: His mother underwent bypass surgery in her late 62s     Social history: Quit smoking 2 to 3 years ago. He does not drink. There both active with him and his wife walking apparently daily.               CARDIAC STUDIES        03/07/22    ECHO STRESS 03/07/2022 3/7/2022    Interpretation Summary  Formatting of this result is different from the original.      Study Impression: The study is normal.    Post-stress Echo: The post-stress echo showed no new wall motion abnormalities. Left Ventricle: Left ventricle size is normal. Normal wall thickness. Normal wall motion. Normal left ventricular systolic function. Normal diastolic function. ECG: Stress ECG was negative for ischemia. There was a brief asymptomatic episode of NSVT. ECG: Resting ECG demonstrates normal sinus rhythm. Stress Test: A Urban protocol stress test was performed. Hemodynamics are adequate for diagnosis. Blood pressure demonstrated a normal response and heart rate demonstrated a normal response to stress. Overall, the patient's exercise capacity was average for their age. The patient reached stage 2 of the protocol after exercising for 3 min and 54 sec. Signed by: Akosua Soliz MD on 3/7/2022  2:02 PM                              EKG Results       Procedure 720 Value Units Date/Time    AMB POC EKG ROUTINE W/ 12 LEADS, INTER & REP [[id number]] Resulted: 08/04/22 0959    Order Status: Completed Updated: 08/04/22 1000                IMAGING      MRI Results (most recent):  Results from [place] encounter on 06/01/21    MRI BRAIN WO CONT    Narrative  INDICATION: Left trigeminal myalgia    EXAMINATION:  MRI BRAIN WO CONTRAST    COMPARISON:  November 14, 2017    TECHNIQUE:  Multiplanar multisequence acquisition without contrast of the brain. Noncontrast axial coronal thin slice imaging through the skull base performed. Imaging was performed on an open MRI. FINDINGS:    Ventricles:  Midline, no hydrocephalus. Brain Parenchyma/Brainstem:  Mild generalized atrophy. Mild chronic T2  hyperintensities throughout the supratentorial white matter and kirsten. No acute  infarction. Intracranial Hemorrhage:  None.   Basal Cisterns:  Normal. No definite cranial nerve abnormality allowing for lack  of IV contrast.  Flow Voids:  Normal.  Additional Comments:  Heterogeneous T2 signal throughout the left maxillary,  ethmoid, and frontal sinuses. Less significant mucosal thickening in the right  frontal, ethmoid and maxillary sinuses. Impression  1. Generalized atrophy and chronic white matter disease as above, with no acute  process. 2. No definite cranial nerve or skull base abnormality allowing for lack of IV  contrast material.  3. Paranasal sinus disease left greater than right. CT Results (most recent):  Results from Hospital Encounter encounter on 09/09/21    CT ABD PELV W CONT    Narrative  CLINICAL HISTORY: Weight loss  INDICATION: Weight loss  COMPARISON: None. CONTRAST: 100  ml Isovue 370  TECHNIQUE:  Multislice helical CT was performed of the abdomen and pelvis following  uneventful rapid bolus intravenous contrast administration. Oral contrast was  not administered. Contiguous 5 mm axial images were reconstructed and lung and  soft tissue windows were generated. Coronal and sagittal reformations were  generated. CT dose reduction was achieved through use of a standardized  protocol tailored for this examination and automatic exposure control for dose  modulation. FINDINGS:  LUNG BASES:No mass lesion or effusion. LIVER: Tiny hepatic hypodensities are most likely simple cysts. There is no  intrahepatic duct dilatation. There is no hepatic parenchymal mass. Hepatic  enhancement pattern is within normal limits. Portal vein is patent. GALLBLADDER:  No dilatation or wall thickening. SPLEEN/PANCREAS: No mass. There is no pancreatic duct dilatation. There is no  evidence of splenomegaly. ADRENALS/KIDNEYS: No mass. There is no hydronephrosis. There is no renal mass. There is no perinephric mass. STOMACH: No dilatation or wall thickening. COLON AND SMALL BOWEL: Fecal stasis. Colonic diverticulosis. There is no free  intraperitoneal air.  There is no evidence of incarceration or obstruction. No  mesenteric adenopathy. PERITONEUM: Inguinal hernia on the right contains loops of small bowel. No  incarceration or obstruction. APPENDIX: Unremarkable. BLADDER/REPRODUCTIVE ORGANS: Enlarged prostate gland. The bladder is  unremarkable. RETROPERITONEUM: Atherosclerotic change is minimal. The abdominal aorta is  normal in caliber. No aneurysm. No retroperitoneal adenopathy. OSSEOUS STRUCTURES: No destructive bone lesion. Impression  No acute intraperitoneal process is identified. Significantly enlarged prostate gland. Colonic diverticulosis without evidence of diverticulitis. Hepatic cysts. Small bowel containing right inguinal hernia without incarceration or  obstruction. Please see above for additional nonemergent incidental findings. XR Results (most recent):  Results from Hospital Encounter encounter on 09/09/21    XR CHEST PA LAT    Narrative  INDICATION: Abnormal weight loss. Additional history:  COMPARISON: Previous chest xray, 8/31/2019. Samara Marquez FINDINGS: PA and lateral view of the chest.  .  Lines/tubes/surgical: None. Heart/mediastinum: Unremarkable. Lungs/pleura:  No focal consolidation or mass. No visualized pleural effusion or  pneumothorax. Additional Comments: Slight kyphosis and degenerative changes in the spine. .    Impression  1. No radiographic evidence of acute cardiopulmonary disease.           Past Medical History:   Diagnosis Date    Allergic rhinitis, cause unspecified     Anxiety     Anxiety     Anxiety disorder 7/2/2010    AR (allergic rhinitis) 7/2/2010    BPH (benign prostatic hyperplasia)     Burning with urination     Degenerative arthritis of lumbar spine 9/12    Dementia ([facility] [address]     Depression     Eustachian tube dysfunction     Ill-defined condition     WHITE MATTER DISEASE    Insomnia due to other mental disorder 5/19/2021    Mild cognitive impairment 5/19/2021    Obstructive sleep apnea syndrome 5/19/2021    CPAP    Right inguinal hernia 5/3/2016    White matter abnormality on MRI of brain      Past Surgical History:   Procedure Laterality Date    ENDOSCOPY, COLON, DIAGNOSTIC      repeat     HX APPENDECTOMY           HX GI      COLONOSCOPY    HX HERNIA REPAIR      HX KNEE ARTHROSCOPY      HX ORTHOPAEDIC  10/75    knee repair    HX TONSILLECTOMY      AS A CHILD    [place] TOTAL KNEE ARTHROPLASTY Right 2021    [place] TOTAL KNEE ARTHROPLASTY Left 2022     Social History     Tobacco Use    Smoking status: Former     Types: Cigars     Quit date:      Years since quittin.5    Smokeless tobacco: Never    Tobacco comments:     off and on   Vaping Use    Vaping Use: Never used   Substance Use Topics    Alcohol use: Yes     Comment: RARE    Drug use: No     Family History   Problem Relation Age of Onset    Heart Disease Mother         CAD s/p CABG 66's    Cancer Father     Alcohol abuse Father     Anesth Problems Neg Hx      No Known Allergies      Visit Vitals  /80 (BP 1 Location: Right arm, BP Patient Position: Sitting, BP Cuff Size: Adult)   Pulse 70   Resp 14   Ht 6' (1.829 m)   Wt 179 lb (81.2 kg)   SpO2 96%   BMI 24.28 kg/m²         Last 3 Recorded Weights in this Encounter    22 0959   Weight: 179 lb (81.2 kg)            Review of Systems:   Pertinent items are noted in the History of Present Illness. Visit Vitals  /80 (BP 1 Location: Right arm, BP Patient Position: Sitting, BP Cuff Size: Adult)   Pulse 70   Resp 14   Ht 6' (1.829 m)   Wt 179 lb (81.2 kg)   SpO2 96%   BMI 24.28 kg/m²     General Appearance:  Well developed, well nourished,alert and oriented x 3, and individual in no acute distress. Ears/Nose/Mouth/Throat:   Hearing grossly normal.         Neck: Supple. Chest:   Lungs clear to auscultation bilaterally. Cardiovascular:  Regular rate and rhythm, S1, S2 normal, no murmur. Abdomen:   Soft, non-tender, bowel sounds are active. Extremities: No edema bilaterally.     Skin: Warm and dry.                 Current Outpatient Medications on File Prior to Visit   Medication Sig Dispense Refill    QUEtiapine (SEROquel) 25 mg tablet Take 1 Tablet by mouth in the morning. memantine (NAMENDA) 10 mg tablet Take 1 Tablet by mouth two (2) times a day. donepeziL (ARICEPT) 10 mg tablet TAKE 1 TABLET BY MOUTH EVERY DAY AT NIGHT 90 Tablet 1    tamsulosin (FLOMAX) 0.4 mg capsule TAKE 1 CAPSULE BY MOUTH EVERY DAY 90 Capsule 2    amoxicillin (AMOXIL) 500 mg capsule 4 tabs po 1 hour prior to procedure or dental visit.      metoprolol succinate (TOPROL-XL) 25 mg XL tablet Take 0.5 Tablets by mouth daily. 45 Tablet 1    latanoprost (XALATAN) 0.005 % ophthalmic solution       buPROPion XL (WELLBUTRIN XL) 150 mg tablet Take 1 Tablet by mouth daily. 90 Tablet 2    traZODone (DESYREL) 50 mg tablet Take 1 Tablet by mouth nightly. 90 Tablet 2    azithromycin (ZITHROMAX) 250 mg tablet TAKE 2 TABLETS BY MOUTH TODAY, THEN TAKE 1 TABLET DAILY FOR 4 DAYS (Patient not taking: Reported on 8/4/2022) 6 Tablet 0    docusate sodium (COLACE) 100 mg capsule Take 100 mg by mouth daily. (Patient not taking: Reported on 8/4/2022)      traMADoL (ULTRAM) 50 mg tablet  (Patient not taking: Reported on 8/4/2022)      memantine ER (NAMENDA XR) 7 mg capsule TAKE 1 CAPSULE BY MOUTH EVERY DAY FOR 30 DAYS (Patient not taking: Reported on 8/4/2022)       No current facility-administered medications on file prior to visit. Umesh Yu had no medications administered during this visit. Current Outpatient Medications   Medication Sig    QUEtiapine (SEROquel) 25 mg tablet Take 1 Tablet by mouth in the morning. memantine (NAMENDA) 10 mg tablet Take 1 Tablet by mouth two (2) times a day. donepeziL (ARICEPT) 10 mg tablet TAKE 1 TABLET BY MOUTH EVERY DAY AT NIGHT    tamsulosin (FLOMAX) 0.4 mg capsule TAKE 1 CAPSULE BY MOUTH EVERY DAY    amoxicillin (AMOXIL) 500 mg capsule 4 tabs po 1 hour prior to procedure or dental visit. metoprolol succinate (TOPROL-XL) 25 mg XL tablet Take 0.5 Tablets by mouth daily. latanoprost (XALATAN) 0.005 % ophthalmic solution     buPROPion XL (WELLBUTRIN XL) 150 mg tablet Take 1 Tablet by mouth daily. traZODone (DESYREL) 50 mg tablet Take 1 Tablet by mouth nightly. azithromycin (ZITHROMAX) 250 mg tablet TAKE 2 TABLETS BY MOUTH TODAY, THEN TAKE 1 TABLET DAILY FOR 4 DAYS (Patient not taking: Reported on 8/4/2022)    docusate sodium (COLACE) 100 mg capsule Take 100 mg by mouth daily. (Patient not taking: Reported on 8/4/2022)    traMADoL (ULTRAM) 50 mg tablet  (Patient not taking: Reported on 8/4/2022)    memantine ER (NAMENDA XR) 7 mg capsule TAKE 1 CAPSULE BY MOUTH EVERY DAY FOR 30 DAYS (Patient not taking: Reported on 8/4/2022)     No current facility-administered medications for this visit. Lab Results   Component Value Date/Time    Cholesterol, total 167 08/18/2020 03:08 PM    HDL Cholesterol 63 08/18/2020 03:08 PM    LDL, calculated 90 08/18/2020 03:08 PM    VLDL, calculated 14 08/18/2020 03:08 PM    Triglyceride 72 08/18/2020 03:08 PM       Lab Results   Component Value Date/Time    Sodium 138 08/24/2021 11:16 AM    Potassium 4.2 08/24/2021 11:16 AM    Chloride 107 08/24/2021 11:16 AM    CO2 28 08/24/2021 11:16 AM    Anion gap 3 (L) 08/24/2021 11:16 AM    Glucose 92 08/24/2021 11:16 AM    BUN 15 08/24/2021 11:16 AM    Creatinine 0.97 08/24/2021 11:16 AM    BUN/Creatinine ratio 15 08/24/2021 11:16 AM    GFR est AA >60 08/24/2021 11:16 AM    GFR est non-AA >60 08/24/2021 11:16 AM    Calcium 9.3 08/24/2021 11:16 AM       Lab Results   Component Value Date/Time    ALT (SGPT) 21 08/24/2021 11:16 AM    Alk.  phosphatase 94 08/24/2021 11:16 AM    Bilirubin, total 0.9 08/24/2021 11:16 AM       Lab Results   Component Value Date/Time    WBC 6.7 08/24/2021 11:16 AM    HGB 15.0 08/24/2021 11:16 AM    HCT 48.7 08/24/2021 11:16 AM    PLATELET 147 77/55/9844 11:16 AM    MCV 99.4 (H) 08/24/2021 11:16 AM       Lab Results   Component Value Date/Time    TSH 1.42 08/24/2021 11:16 AM         Lab Results   Component Value Date/Time    Cholesterol, total 167 08/18/2020 03:08 PM    Cholesterol, total 158 10/30/2019 11:29 AM    Cholesterol, total 161 10/17/2018 12:17 PM    Cholesterol, total 176 09/26/2017 12:00 PM    Cholesterol, total 150 05/03/2016 09:40 AM    HDL Cholesterol 63 08/18/2020 03:08 PM    HDL Cholesterol 54 10/30/2019 11:29 AM    HDL Cholesterol 56 10/17/2018 12:17 PM    HDL Cholesterol 60 09/26/2017 12:00 PM    HDL Cholesterol 47 05/03/2016 09:40 AM    LDL, calculated 90 08/18/2020 03:08 PM    LDL, calculated 80 10/30/2019 11:29 AM    LDL, calculated 86 10/17/2018 12:17 PM    LDL, calculated 97 09/26/2017 12:00 PM    LDL, calculated 86 05/03/2016 09:40 AM    Triglyceride 72 08/18/2020 03:08 PM    Triglyceride 119 10/30/2019 11:29 AM    Triglyceride 96 10/17/2018 12:17 PM    Triglyceride 94 09/26/2017 12:00 PM    Triglyceride 87 05/03/2016 09:40 AM                Please note that this dictation was completed with abusix, the computer voice recognition software. Quite often unanticipated grammatical, syntax, homophones, and other interpretative errors are inadvertently transcribed by the computer software. Please disregard these errors. Please excuse any errors that have escaped final proofreading.

## 2022-08-04 NOTE — PROGRESS NOTES
Visit Vitals  /80 (BP 1 Location: Right arm, BP Patient Position: Sitting, BP Cuff Size: Adult)   Pulse 70   Resp 14   Ht 6' (1.829 m)   Wt 179 lb (81.2 kg)   SpO2 96%   BMI 24.28 kg/m²

## 2022-09-06 RX ORDER — METOPROLOL SUCCINATE 25 MG/1
TABLET, EXTENDED RELEASE ORAL
Qty: 45 TABLET | Refills: 1 | Status: SHIPPED | OUTPATIENT
Start: 2022-09-06

## 2022-09-06 NOTE — TELEPHONE ENCOUNTER
Cardiologist: Dr. Zakia Chaves    Last appt: 8/4/2022  Future Appointments   Date Time Provider Sourav Carrero   2/9/2023  9:40 AM MD KATHARINE Maurer BS AMB       Requested Prescriptions     Signed Prescriptions Disp Refills    metoprolol succinate (TOPROL-XL) 25 mg XL tablet 45 Tablet 1     Sig: TAKE 1/2 TABLET BY MOUTH EVERY DAY     Authorizing Provider: Umesh Sanchez     Ordering User: AUSTEN HERNANDEZ         Refills VO per Dr. Zakia Chaves.

## 2022-09-13 NOTE — TELEPHONE ENCOUNTER
Patient's spouse Adriel Durham is inquiring whether the patient is to continue wearing the monitor after Sunday, unsure whether it was for two weeks or 30 days.       TKH:[phone] Gabapentin Counseling: I discussed with the patient the risks of gabapentin including but not limited to dizziness, somnolence, fatigue and ataxia.

## 2023-01-01 DIAGNOSIS — G31.84 MILD COGNITIVE IMPAIRMENT: ICD-10-CM

## 2023-01-02 RX ORDER — DONEPEZIL HYDROCHLORIDE 10 MG/1
TABLET, FILM COATED ORAL
Qty: 90 TABLET | Refills: 1 | Status: SHIPPED | OUTPATIENT
Start: 2023-01-02

## 2023-01-20 ENCOUNTER — TELEPHONE (OUTPATIENT)
Dept: FAMILY MEDICINE CLINIC | Age: 73
End: 2023-01-20

## 2023-01-20 NOTE — TELEPHONE ENCOUNTER
----- Message from Arian Curran sent at 1/20/2023  9:48 AM EST -----  Subject: Message to Provider    QUESTIONS  Information for Provider? Pt was diagnosed with brain disease and his wife   would like to get him in sooner for his AWV if possible. If there are any   cancellations please call pt back. ---------------------------------------------------------------------------  --------------  Zora Bosworth [facility]  [phone]; OK to leave message on voicemail  ---------------------------------------------------------------------------  --------------  SCRIPT ANSWERS  Relationship to Patient?  Self

## 2023-03-02 DIAGNOSIS — F99 INSOMNIA DUE TO OTHER MENTAL DISORDER: ICD-10-CM

## 2023-03-02 DIAGNOSIS — F51.05 INSOMNIA DUE TO OTHER MENTAL DISORDER: ICD-10-CM

## 2023-03-02 RX ORDER — TRAZODONE HYDROCHLORIDE 50 MG/1
50 TABLET ORAL
Qty: 90 TABLET | Refills: 0 | Status: SHIPPED | OUTPATIENT
Start: 2023-03-02

## 2023-03-02 NOTE — TELEPHONE ENCOUNTER
Spoke to pt's wife Iftikhar Valle) on [address] on 3/2/23 informed her of 's message. Iftikhar Valle) stated that the pt has been out of Rehab since 4/10/22,the wife state's that he only has 6-7 pill's left of theTrazadone. Iftikhar Valle) would like if Aung Gan could give him enough until he see's her on 3/31/23 @ 10:30 AM.

## 2023-03-02 NOTE — TELEPHONE ENCOUNTER
If he was recently in rehab, he needs an appt to go over any medication changes before this medication is refilled.

## 2023-03-02 NOTE — TELEPHONE ENCOUNTER
Patient's wife calling for refill trazodone for patient. (Stated [facility] and was receiving via another physician.)  Krissy Dunaway     Last Visit: 4/22/22 [address], 4/19/22 [place]  Next Appointment: 3/31/23 MD Anguiano  Previous Refill Encounter(s): 10/12/21 90 + 2    Requested Prescriptions     Pending Prescriptions Disp Refills    traZODone (DESYREL) 50 mg tablet 90 Tablet 0     Sig: Take 1 Tablet by mouth nightly. For Pharmacy Admin Tracking Only    Program: Medication Refill  CPA in place:   Recommendation Provided To:    Intervention Detail: New Rx: 1, reason: Patient Preference  Intervention Accepted By:   Aaliyah Dyer Closed?:   Time Spent (min): 5

## 2023-03-31 ENCOUNTER — OFFICE VISIT (OUTPATIENT)
Dept: FAMILY MEDICINE CLINIC | Age: 73
End: 2023-03-31

## 2023-03-31 VITALS
BODY MASS INDEX: 23.84 KG/M2 | SYSTOLIC BLOOD PRESSURE: 111 MMHG | RESPIRATION RATE: 16 BRPM | HEIGHT: 72 IN | DIASTOLIC BLOOD PRESSURE: 63 MMHG | OXYGEN SATURATION: 97 % | TEMPERATURE: 98 F | HEART RATE: 75 BPM | WEIGHT: 176 LBS

## 2023-03-31 DIAGNOSIS — Z00.00 ENCOUNTER FOR MEDICARE ANNUAL WELLNESS EXAM: Primary | ICD-10-CM

## 2023-03-31 DIAGNOSIS — Z13.6 SCREENING FOR ISCHEMIC HEART DISEASE: ICD-10-CM

## 2023-03-31 NOTE — PROGRESS NOTES
This is the Subsequent Medicare Annual Wellness Exam, performed 12 months or more after the Initial AWV or the last Subsequent AWV    I have reviewed the patient's medical history in detail and updated the computerized patient record. Assessment/Plan   Education and counseling provided:  Are appropriate based on today's review and evaluation    1. Encounter for Medicare annual wellness exam  2. Screening for ischemic heart disease  -     CBC W/O DIFF; Future  -     METABOLIC PANEL, COMPREHENSIVE; Future  -     LIPID PANEL; Future       Depression Risk Factor Screening     3 most recent PHQ Screens 3/31/2023   Little interest or pleasure in doing things Not at all   Feeling down, depressed, irritable, or hopeless Not at all   Total Score PHQ 2 0   Trouble falling or staying asleep, or sleeping too much Not at all   Feeling tired or having little energy Not at all   Poor appetite, weight loss, or overeating Not at all   Feeling bad about yourself - or that you are a failure or have let yourself or your family down Not at all   Trouble concentrating on things such as school, work, reading, or watching TV Not at all   Moving or speaking so slowly that other people could have noticed; or the opposite being so fidgety that others notice Not at all   Thoughts of being better off dead, or hurting yourself in some way Not at all   PHQ 9 Score 0   How difficult have these problems made it for you to do your work, take care of your home and get along with others Not difficult at all       Alcohol & Drug Abuse Risk Screen    Do you average more than 1 drink per night or more than 7 drinks a week: No    In the past three months have you have had more than 4 drinks containing alcohol on one occasion: No          Functional Ability and Level of Safety    Hearing: Hearing is good. Activities of Daily Living: The home contains: no safety equipment.   ADL Assessment 3/31/2023   Feeding yourself No Help Needed   Getting from bed to chair No Help Needed   Getting dressed No Help Needed   Bathing or showering No Help Needed   Walk across the room (includes cane/walker) No Help Needed   Using the telphone No Help Needed   Taking your medications Help Needed   Preparing meals No Help Needed   Managing money (expenses/bills) Help Needed   Moderately strenuous housework (laundry) No Help Needed   Shopping for personal items (toiletries/medicines) Help Needed   Driving Help Needed   Climbing a flight of stairs No Help Needed   Getting to places beyond walking distances No Help Needed         Ambulation: with no difficulty     Fall Risk:  Fall Risk Assessment, last 12 mths 3/31/2023   Able to walk? Yes   Fall in past 12 months? 0   Do you feel unsteady? 0   Are you worried about falling 0   Is TUG test greater than 12 seconds? -   Is the gait abnormal? -   Number of falls in past 12 months -   Fall with injury? -      Abuse Screen:  Patient is not abused       Cognitive Screening    Has your family/caregiver stated any concerns about your memory: yes - hx of MCI, weaning off of medications as they are not helping.        Health Maintenance Due     Health Maintenance Due   Topic Date Due    DTaP/Tdap/Td series (2 - Td or Tdap) 09/18/2019    COVID-19 Vaccine (3 - Booster for Moderna series) 08/12/2021    Flu Vaccine (1) 08/01/2022       Patient Care Team   Patient Care Team:  Preston Bass MD as PCP - General (Family Medicine)  Preston Bass MD as PCP - [facility] Empaneled Provider  Mabel Galvan MD (Cardiovascular Disease Physician)  Guru Luna MD (Neurology)  Paulino Welch MD (General Surgery)  Princess Muñoz MD (Cardiovascular Disease Physician)    History     Patient Active Problem List   Diagnosis Code    AR (allergic rhinitis) J30.9    Anxiety disorder F41.9    Right inguinal hernia K40.90    ACP (advance care planning) Z71.89    Obstructive sleep apnea syndrome G47.33    Mild cognitive impairment G31.84 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, 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    Degenerative arthritis of lumbar spine M47.816    Eustachian tube dysfunction H69.80     Past Medical History:   Diagnosis Date    Allergic rhinitis, cause unspecified     Anxiety     Anxiety     Anxiety disorder 2010    AR (allergic rhinitis) 2010    BPH (benign prostatic hyperplasia)     Burning with urination     Degenerative arthritis of lumbar spine     Dementia ([address]     Depression     Eustachian tube dysfunction     Ill-defined condition     WHITE MATTER DISEASE    Insomnia due to other mental disorder 2021    Mild cognitive impairment 2021    Obstructive sleep apnea syndrome 2021    CPAP    Right inguinal hernia 5/3/2016    White matter abnormality on MRI of brain       Past Surgical History:   Procedure Laterality Date    ENDOSCOPY, COLON, DIAGNOSTIC      repeat     HX APPENDECTOMY           HX GI      COLONOSCOPY    HX HERNIA REPAIR      HX KNEE ARTHROSCOPY      HX ORTHOPAEDIC  10/75    knee repair    HX TONSILLECTOMY      AS A CHILD    [place] ARTHRP KNE CONDYLE&PLATU MEDIAL&LAT COMPARTMENTS Right 2021    [place] ARTHRP KNE CONDYLE&PLATU MEDIAL&LAT COMPARTMENTS Left 2022     Current Outpatient Medications   Medication Sig Dispense Refill    traZODone (DESYREL) 50 mg tablet Take 1 Tablet by mouth nightly.  90 Tablet 0    donepeziL (ARICEPT) 10 mg tablet TAKE 1 TABLET BY MOUTH EVERY DAY AT NIGHT 90 Tablet 1    tamsulosin (FLOMAX) 0.4 mg capsule TAKE 1 CAPSULE BY MOUTH EVERY DAY 90 Capsule 2    latanoprost (XALATAN) 0.005 % ophthalmic solution        No Known Allergies    Family History   Problem Relation Age of Onset    Heart Disease Mother         CAD s/p CABG 66's    Cancer Father     Alcohol abuse Father     Anesth Problems Neg Hx      Social History     Tobacco Use    Smoking status: Former     Types: Cigars     Quit date:      Years since quittin.2    Smokeless tobacco: Never    Tobacco comments: off and on   Substance Use Topics    Alcohol use: Yes     Comment: NICK Fagan MD

## 2023-03-31 NOTE — PROGRESS NOTES
Chief Complaint   Patient presents with    Annual Wellness Visit         1. \"Have you been to the ER, urgent care clinic since your last visit? Hospitalized since your last visit? \" No    2. \"Have you seen or consulted any other health care providers outside of the [address] [place] [address] since your last visit? \" No     3. For patients aged 39-70: Has the patient had a colonoscopy / FIT/ Cologuard? Yes - no Care Gap present      If the patient is female:    4. For patients aged 41-77: Has the patient had a mammogram within the past 2 years? NA - based on age or sex      11. For patients aged 21-65: Has the patient had a pap smear?  NA - based on age or sex         3 most recent PHQ Screens 3/31/2023   Little interest or pleasure in doing things Not at all   Feeling down, depressed, irritable, or hopeless Not at all   Total Score PHQ 2 0   Trouble falling or staying asleep, or sleeping too much Not at all   Feeling tired or having little energy Not at all   Poor appetite, weight loss, or overeating Not at all   Feeling bad about yourself - or that you are a failure or have let yourself or your family down Not at all   Trouble concentrating on things such as school, work, reading, or watching TV Not at all   Moving or speaking so slowly that other people could have noticed; or the opposite being so fidgety that others notice Not at all   Thoughts of being better off dead, or hurting yourself in some way Not at all   PHQ 9 Score 0   How difficult have these problems made it for you to do your work, take care of your home and get along with others Not difficult at all       Health Maintenance Due   Topic Date Due    DTaP/Tdap/Td series (2 - Td or Tdap) 09/18/2019    COVID-19 Vaccine (3 - Booster for Shena Jolly series) 08/12/2021    Flu Vaccine (1) 08/01/2022    Medicare Yearly Exam  03/18/2023

## 2023-04-03 LAB
ALBUMIN SERPL-MCNC: 3.7 G/DL (ref 3.5–5)
ALBUMIN/GLOB SERPL: 1.3 (ref 1.1–2.2)
ALP SERPL-CCNC: 77 U/L (ref 45–117)
ALT SERPL-CCNC: 25 U/L (ref 12–78)
ANION GAP SERPL CALC-SCNC: 4 MMOL/L (ref 5–15)
AST SERPL-CCNC: 26 U/L (ref 15–37)
BILIRUB SERPL-MCNC: 0.5 MG/DL (ref 0.2–1)
BUN SERPL-MCNC: 16 MG/DL (ref 6–20)
BUN/CREAT SERPL: 15 (ref 12–20)
CALCIUM SERPL-MCNC: 9.4 MG/DL (ref 8.5–10.1)
CHLORIDE SERPL-SCNC: 107 MMOL/L (ref 97–108)
CHOLEST SERPL-MCNC: 171 MG/DL
CO2 SERPL-SCNC: 30 MMOL/L (ref 21–32)
CREAT SERPL-MCNC: 1.1 MG/DL (ref 0.7–1.3)
ERYTHROCYTE [DISTWIDTH] IN BLOOD BY AUTOMATED COUNT: 14.2 % (ref 11.5–14.5)
GLOBULIN SER CALC-MCNC: 2.9 G/DL (ref 2–4)
GLUCOSE SERPL-MCNC: 96 MG/DL (ref 65–100)
HCT VFR BLD AUTO: 45.5 % (ref 36.6–50.3)
HDLC SERPL-MCNC: 64 MG/DL
HDLC SERPL: 2.7 (ref 0–5)
HGB BLD-MCNC: 14.5 G/DL (ref 12.1–17)
LDLC SERPL CALC-MCNC: 81.6 MG/DL (ref 0–100)
MCH RBC QN AUTO: 31 PG (ref 26–34)
MCHC RBC AUTO-ENTMCNC: 31.9 G/DL (ref 30–36.5)
MCV RBC AUTO: 97.2 FL (ref 80–99)
NRBC # BLD: 0 K/UL (ref 0–0.01)
NRBC BLD-RTO: 0 PER 100 WBC
PLATELET # BLD AUTO: 276 K/UL (ref 150–400)
PMV BLD AUTO: 10.7 FL (ref 8.9–12.9)
POTASSIUM SERPL-SCNC: 4.2 MMOL/L (ref 3.5–5.1)
PROT SERPL-MCNC: 6.6 G/DL (ref 6.4–8.2)
RBC # BLD AUTO: 4.68 M/UL (ref 4.1–5.7)
SODIUM SERPL-SCNC: 141 MMOL/L (ref 136–145)
TRIGL SERPL-MCNC: 127 MG/DL (ref ?–150)
VLDLC SERPL CALC-MCNC: 25.4 MG/DL
WBC # BLD AUTO: 7.4 K/UL (ref 4.1–11.1)

## 2023-04-04 ENCOUNTER — TELEPHONE (OUTPATIENT)
Dept: FAMILY MEDICINE CLINIC | Age: 73
End: 2023-04-04

## 2023-04-17 ENCOUNTER — OFFICE VISIT (OUTPATIENT)
Dept: CARDIOLOGY CLINIC | Age: 73
End: 2023-04-17
Payer: MEDICARE

## 2023-04-17 VITALS
OXYGEN SATURATION: 97 % | HEART RATE: 62 BPM | RESPIRATION RATE: 18 BRPM | WEIGHT: 174 LBS | DIASTOLIC BLOOD PRESSURE: 80 MMHG | BODY MASS INDEX: 23.57 KG/M2 | SYSTOLIC BLOOD PRESSURE: 140 MMHG | HEIGHT: 72 IN

## 2023-04-17 DIAGNOSIS — R55 SYNCOPE, UNSPECIFIED SYNCOPE TYPE: Primary | ICD-10-CM

## 2023-04-17 PROCEDURE — G8432 DEP SCR NOT DOC, RNG: HCPCS | Performed by: SPECIALIST

## 2023-04-17 PROCEDURE — 1123F ACP DISCUSS/DSCN MKR DOCD: CPT | Performed by: SPECIALIST

## 2023-04-17 PROCEDURE — G0463 HOSPITAL OUTPT CLINIC VISIT: HCPCS | Performed by: SPECIALIST

## 2023-04-17 PROCEDURE — G8536 NO DOC ELDER MAL SCRN: HCPCS | Performed by: SPECIALIST

## 2023-04-17 PROCEDURE — 93010 ELECTROCARDIOGRAM REPORT: CPT | Performed by: SPECIALIST

## 2023-04-17 PROCEDURE — 93005 ELECTROCARDIOGRAM TRACING: CPT | Performed by: SPECIALIST

## 2023-04-17 PROCEDURE — 99214 OFFICE O/P EST MOD 30 MIN: CPT | Performed by: SPECIALIST

## 2023-04-17 PROCEDURE — G8420 CALC BMI NORM PARAMETERS: HCPCS | Performed by: SPECIALIST

## 2023-04-17 PROCEDURE — G8427 DOCREV CUR MEDS BY ELIG CLIN: HCPCS | Performed by: SPECIALIST

## 2023-04-17 PROCEDURE — 3017F COLORECTAL CA SCREEN DOC REV: CPT | Performed by: SPECIALIST

## 2023-04-17 PROCEDURE — 1101F PT FALLS ASSESS-DOCD LE1/YR: CPT | Performed by: SPECIALIST

## 2023-04-17 NOTE — PROGRESS NOTES
.  Chief Complaint   Patient presents with    Follow-up     6 months     Vitals:    04/17/23 1321 04/17/23 1327   BP: (!) 142/86 (!) 140/80   BP 1 Location: Left upper arm Right arm   BP Patient Position: Sitting Sitting   BP Cuff Size: Small adult Small adult   Pulse: 62    Resp: 18    Height: 6' (1.829 m)    Weight: 174 lb (78.9 kg)    SpO2: 97%      Chest pain denied   SOB denied   Palpitations denied   Swelling in hands/feet denied   Dizziness denied   Recent hospital stays denied   Refills denied

## 2023-04-17 NOTE — PROGRESS NOTES
[facility]                                                            OFFICE NOTE        Andria Melvin M.D.,RADHA Ji Pearce   1950  [id number]    Date/Time:  4/17/202312:52 PM            SUBJECTIVE:  He is doing well he denies any chest pain shortness of breath palpitation presyncopal syncopal episode. The last syncopal episode was more than a year ago. He is now very conscious about electrolytes on hydration. His wife who is in attendance confirms that also tells me that they have been trying to get rid of all his medications. They in fact have stopped Toprol several months ago on their own accord. Assessment/Plan  1. Syncope: No recurrent syncope. His blood pressure today is completely normal.  electrocardiogram reveales a normal sinus rhythm with no significant ST-T changes and normal intervals. Stress echo on 3/22 was negatve     Discussed also the presence of nonsustained VT on the stress test but also nonsustained VT although brief on event monitor. I do not think that this was the cause of his syncope at this time. Now off Toprol on his own accord     stay well-hydrated and increase his salt intake. For now no additional interventions cardiac wise. 2.  Postop:did well postop for his LTKR     3. BPH: On Flomax. 4.  Memory impairment: closely followed by his primary care physician also on Aricept. Apparently some worsening. Otherwise I will see him back in 1 year or sooner if any question or plans arise prior to the      HPI   67years old male with the past medical history remarkable for generalized anxiety disorder, memory impairment, benign prostate hypertrophy who presents today for cardiac evaluation of the syncope. Past medical history: As above     He denies any history hypertension hyperlipidemia diabetes or coronary disease. Surgical history: Left total knee replacement inguinal hernia repair     Family history: His mother underwent bypass surgery in her late 62s     Social history: Quit smoking 2 to 3 years ago. He does not drink. There both active with him and his wife walking apparently daily. CARDIAC STUDIES        03/07/22    ECHO STRESS 03/07/2022 3/7/2022    Interpretation Summary    Study Impression: The study is normal.    Post-stress Echo: The post-stress echo showed no new wall motion abnormalities. Left Ventricle: Left ventricle size is normal. Normal wall thickness. Normal wall motion. Normal left ventricular systolic function. Normal diastolic function. ECG: Stress ECG was negative for ischemia. There was a brief asymptomatic episode of NSVT. ECG: Resting ECG demonstrates normal sinus rhythm. Stress Test: A Urban protocol stress test was performed. Hemodynamics are adequate for diagnosis. Blood pressure demonstrated a normal response and heart rate demonstrated a normal response to stress. Overall, the patient's exercise capacity was average for their age. The patient reached stage 2 of the protocol after exercising for 3 min and 54 sec. Signed by: Bart Calles MD on 3/7/2022  2:02 PM                              EKG Results       None                IMAGING      MRI Results (most recent):  Results from [place] encounter on 06/01/21    MRI BRAIN WO CONT    Narrative  INDICATION: Left trigeminal myalgia    EXAMINATION:  MRI BRAIN WO CONTRAST    COMPARISON:  November 14, 2017    TECHNIQUE:  Multiplanar multisequence acquisition without contrast of the brain. Noncontrast axial coronal thin slice imaging through the skull base performed. Imaging was performed on an open MRI. FINDINGS:    Ventricles:  Midline, no hydrocephalus. Brain Parenchyma/Brainstem:  Mild generalized atrophy. Mild chronic T2  hyperintensities throughout the supratentorial white matter and kirsten.  No acute  infarction. Intracranial Hemorrhage:  None. Basal Cisterns:  Normal. No definite cranial nerve abnormality allowing for lack  of IV contrast.  Flow Voids:  Normal.  Additional Comments:  Heterogeneous T2 signal throughout the left maxillary,  ethmoid, and frontal sinuses. Less significant mucosal thickening in the right  frontal, ethmoid and maxillary sinuses. Impression  1. Generalized atrophy and chronic white matter disease as above, with no acute  process. 2. No definite cranial nerve or skull base abnormality allowing for lack of IV  contrast material.  3. Paranasal sinus disease left greater than right. CT Results (most recent):  Results from [facility] encounter on 08/09/22    CT LOW EXT LT WO CONT      XR Results (most recent):  Results from [place] encounter on 09/09/21    XR CHEST PA LAT    Narrative  INDICATION: Abnormal weight loss. Additional history:  COMPARISON: Previous chest xray, 8/31/2019. Alan Carbone FINDINGS: PA and lateral view of the chest.  .  Lines/tubes/surgical: None. Heart/mediastinum: Unremarkable. Lungs/pleura:  No focal consolidation or mass. No visualized pleural effusion or  pneumothorax. Additional Comments: Slight kyphosis and degenerative changes in the spine. .    Impression  1. No radiographic evidence of acute cardiopulmonary disease.           Past Medical History:   Diagnosis Date    Allergic rhinitis, cause unspecified     Anxiety     Anxiety     Anxiety disorder 7/2/2010    AR (allergic rhinitis) 7/2/2010    BPH (benign prostatic hyperplasia)     Burning with urination     Degenerative arthritis of lumbar spine 9/12    Dementia [address] [address]     Depression     Eustachian tube dysfunction     Ill-defined condition     WHITE MATTER DISEASE    Insomnia due to other mental disorder 5/19/2021    Mild cognitive impairment 5/19/2021    Obstructive sleep apnea syndrome 5/19/2021    CPAP    Right inguinal hernia 5/3/2016    White matter abnormality on MRI of brain Past Surgical History:   Procedure Laterality Date    ENDOSCOPY, COLON, DIAGNOSTIC      repeat     HX APPENDECTOMY           HX GI      COLONOSCOPY    HX HERNIA REPAIR      HX KNEE ARTHROSCOPY      HX ORTHOPAEDIC  10/75    knee repair    HX TONSILLECTOMY      AS A CHILD    [place] ARTHRP KNE CONDYLE&PLATU MEDIAL&LAT COMPARTMENTS Right 2021    [place] ARTHRP KNE CONDYLE&PLATU MEDIAL&LAT COMPARTMENTS Left 2022     Social History     Tobacco Use    Smoking status: Former     Types: Cigars     Quit date:      Years since quittin.2    Smokeless tobacco: Never    Tobacco comments:     off and on   Vaping Use    Vaping Use: Never used   Substance Use Topics    Alcohol use: Yes     Comment: RARE    Drug use: No     Family History   Problem Relation Age of Onset    Heart Disease Mother         CAD s/p CABG 66's    Cancer Father     Alcohol abuse Father     Anesth Problems Neg Hx      No Known Allergies      There were no vitals taken for this visit. There were no vitals filed for this visit. Review of Systems:   Pertinent items are noted in the History of Present Illness. Visit Vitals  BP (!) 140/80 (BP 1 Location: Right arm, BP Patient Position: Sitting, BP Cuff Size: Small adult)   Pulse 62   Resp 18   Ht 6' (1.829 m)   Wt 174 lb (78.9 kg)   SpO2 97%   BMI 23.60 kg/m²     General Appearance:  Well developed, well nourished,alert and oriented x 3, and individual in no acute distress. Ears/Nose/Mouth/Throat:   Hearing grossly normal.         Neck: Supple. Chest:   Lungs clear to auscultation bilaterally. Cardiovascular:  Regular rate and rhythm, S1, S2 normal, no murmur. Abdomen:   Soft, non-tender, bowel sounds are active. Extremities: No edema bilaterally. Skin: Warm and dry.                  Current Outpatient Medications on File Prior to Visit   Medication Sig Dispense Refill    tamsulosin (FLOMAX) 0.4 mg capsule TAKE 1 CAPSULE BY MOUTH EVERY DAY 90 Capsule 2 traZODone (DESYREL) 50 mg tablet Take 1 Tablet by mouth nightly. 90 Tablet 0    donepeziL (ARICEPT) 10 mg tablet TAKE 1 TABLET BY MOUTH EVERY DAY AT NIGHT 90 Tablet 1    latanoprost (XALATAN) 0.005 % ophthalmic solution        No current facility-administered medications on file prior to visit. Himadijayleen Raheem Gigi had no medications administered during this visit. Current Outpatient Medications   Medication Sig    tamsulosin (FLOMAX) 0.4 mg capsule TAKE 1 CAPSULE BY MOUTH EVERY DAY    traZODone (DESYREL) 50 mg tablet Take 1 Tablet by mouth nightly. donepeziL (ARICEPT) 10 mg tablet TAKE 1 TABLET BY MOUTH EVERY DAY AT NIGHT    latanoprost (XALATAN) 0.005 % ophthalmic solution      No current facility-administered medications for this visit. Lab Results   Component Value Date/Time    Cholesterol, total 171 03/31/2023 11:26 AM    HDL Cholesterol 64 03/31/2023 11:26 AM    LDL, calculated 81.6 03/31/2023 11:26 AM    VLDL, calculated 25.4 03/31/2023 11:26 AM    Triglyceride 127 03/31/2023 11:26 AM    CHOL/HDL Ratio 2.7 03/31/2023 11:26 AM       Lab Results   Component Value Date/Time    Sodium 141 03/31/2023 11:26 AM    Potassium 4.2 03/31/2023 11:26 AM    Chloride 107 03/31/2023 11:26 AM    CO2 30 03/31/2023 11:26 AM    Anion gap 4 (L) 03/31/2023 11:26 AM    Glucose 96 03/31/2023 11:26 AM    BUN 16 03/31/2023 11:26 AM    Creatinine 1.10 03/31/2023 11:26 AM    BUN/Creatinine ratio 15 03/31/2023 11:26 AM    GFR est AA >60 08/24/2021 11:16 AM    GFR est non-AA >60 08/24/2021 11:16 AM    Calcium 9.4 03/31/2023 11:26 AM       Lab Results   Component Value Date/Time    ALT (SGPT) 25 03/31/2023 11:26 AM    Alk.  phosphatase 77 03/31/2023 11:26 AM    Bilirubin, total 0.5 03/31/2023 11:26 AM       Lab Results   Component Value Date/Time    WBC 7.4 03/31/2023 11:26 AM    HGB 14.5 03/31/2023 11:26 AM    HCT 45.5 03/31/2023 11:26 AM    PLATELET 389 20/17/8530 11:26 AM    MCV 97.2 03/31/2023 11:26 AM       Lab Results   Component Value Date/Time    TSH 1.42 08/24/2021 11:16 AM         Lab Results   Component Value Date/Time    Cholesterol, total 171 03/31/2023 11:26 AM    Cholesterol, total 167 08/18/2020 03:08 PM    Cholesterol, total 158 10/30/2019 11:29 AM    Cholesterol, total 161 10/17/2018 12:17 PM    Cholesterol, total 176 09/26/2017 12:00 PM    HDL Cholesterol 64 03/31/2023 11:26 AM    HDL Cholesterol 63 08/18/2020 03:08 PM    HDL Cholesterol 54 10/30/2019 11:29 AM    HDL Cholesterol 56 10/17/2018 12:17 PM    HDL Cholesterol 60 09/26/2017 12:00 PM    LDL, calculated 81.6 03/31/2023 11:26 AM    LDL, calculated 90 08/18/2020 03:08 PM    LDL, calculated 80 10/30/2019 11:29 AM    LDL, calculated 86 10/17/2018 12:17 PM    LDL, calculated 97 09/26/2017 12:00 PM    Triglyceride 127 03/31/2023 11:26 AM    Triglyceride 72 08/18/2020 03:08 PM    Triglyceride 119 10/30/2019 11:29 AM    Triglyceride 96 10/17/2018 12:17 PM    Triglyceride 94 09/26/2017 12:00 PM    CHOL/HDL Ratio 2.7 03/31/2023 11:26 AM                Please note that this dictation was completed with [email or web address], the [employer] voice recognition software. Quite often unanticipated grammatical, syntax, homophones, and other interpretative errors are inadvertently transcribed by the computer software. Please disregard these errors. Please excuse any errors that have escaped final proofreading.

## 2023-05-05 ENCOUNTER — OFFICE VISIT (OUTPATIENT)
Dept: FAMILY MEDICINE CLINIC | Age: 73
End: 2023-05-05

## 2023-05-05 VITALS
HEIGHT: 72 IN | RESPIRATION RATE: 16 BRPM | SYSTOLIC BLOOD PRESSURE: 137 MMHG | TEMPERATURE: 98.9 F | OXYGEN SATURATION: 98 % | BODY MASS INDEX: 24.14 KG/M2 | HEART RATE: 67 BPM | DIASTOLIC BLOOD PRESSURE: 79 MMHG | WEIGHT: 178.2 LBS

## 2023-05-05 DIAGNOSIS — Z02.89 ENCOUNTER FOR COMPLETION OF FORM WITH PATIENT: ICD-10-CM

## 2023-05-05 DIAGNOSIS — R41.0 CONFUSION: ICD-10-CM

## 2023-05-05 DIAGNOSIS — F03.B18 MODERATE DEMENTIA WITH OTHER BEHAVIORAL DISTURBANCE, UNSPECIFIED DEMENTIA TYPE (HCC): Primary | ICD-10-CM

## 2023-05-05 PROBLEM — F03.B0 MODERATE DEMENTIA (HCC): Status: ACTIVE | Noted: 2023-05-05

## 2023-05-05 LAB
BILIRUB UR QL STRIP: NEGATIVE
GLUCOSE UR-MCNC: NEGATIVE MG/DL
KETONES P FAST UR STRIP-MCNC: NEGATIVE MG/DL
PH UR STRIP: 5.5 [PH] (ref 4.6–8)
PROT UR QL STRIP: NEGATIVE
SP GR UR STRIP: 1.03 (ref 1–1.03)
UA UROBILINOGEN AMB POC: NORMAL (ref 0.2–1)
URINALYSIS CLARITY POC: CLEAR
URINALYSIS COLOR POC: NORMAL
URINE BLOOD POC: NEGATIVE
URINE LEUKOCYTES POC: NEGATIVE
URINE NITRITES POC: NEGATIVE

## 2023-05-05 RX ORDER — MEMANTINE HYDROCHLORIDE 10 MG/1
TABLET ORAL
COMMUNITY

## 2023-05-08 PROBLEM — G31.84 MILD COGNITIVE IMPAIRMENT: Status: RESOLVED | Noted: 2021-05-19 | Resolved: 2023-05-08

## 2023-05-16 ENCOUNTER — TELEPHONE (OUTPATIENT)
Age: 73
End: 2023-05-16

## 2023-05-16 NOTE — TELEPHONE ENCOUNTER
Patient's wife called to check on the 5 forms she left for  to go to adult day care.     Requesting a call back    Best call back #[phone]

## 2023-05-17 NOTE — TELEPHONE ENCOUNTER
Outbound call to patient wife who is on pt PHI. Name and  verified, let patient wife know that papers were faxed on 23 with a completed conformation and also let wife know we re-faxed forms again today. Wife understood.

## 2023-05-23 ENCOUNTER — OFFICE VISIT (OUTPATIENT)
Age: 73
End: 2023-05-23
Payer: MEDICARE

## 2023-05-23 VITALS
HEART RATE: 75 BPM | SYSTOLIC BLOOD PRESSURE: 125 MMHG | BODY MASS INDEX: 24.24 KG/M2 | RESPIRATION RATE: 16 BRPM | HEIGHT: 72 IN | DIASTOLIC BLOOD PRESSURE: 75 MMHG | WEIGHT: 179 LBS | TEMPERATURE: 97.7 F | OXYGEN SATURATION: 96 %

## 2023-05-23 DIAGNOSIS — Z02.89 ENCOUNTER FOR COMPLETION OF FORM WITH PATIENT: Primary | ICD-10-CM

## 2023-05-23 PROCEDURE — G8427 DOCREV CUR MEDS BY ELIG CLIN: HCPCS | Performed by: FAMILY MEDICINE

## 2023-05-23 PROCEDURE — 3017F COLORECTAL CA SCREEN DOC REV: CPT | Performed by: FAMILY MEDICINE

## 2023-05-23 PROCEDURE — G8420 CALC BMI NORM PARAMETERS: HCPCS | Performed by: FAMILY MEDICINE

## 2023-05-23 PROCEDURE — 1123F ACP DISCUSS/DSCN MKR DOCD: CPT | Performed by: FAMILY MEDICINE

## 2023-05-23 PROCEDURE — 99213 OFFICE O/P EST LOW 20 MIN: CPT | Performed by: FAMILY MEDICINE

## 2023-05-23 PROCEDURE — 1036F TOBACCO NON-USER: CPT | Performed by: FAMILY MEDICINE

## 2023-05-23 SDOH — ECONOMIC STABILITY: FOOD INSECURITY: WITHIN THE PAST 12 MONTHS, YOU WORRIED THAT YOUR FOOD WOULD RUN OUT BEFORE YOU GOT MONEY TO BUY MORE.: NEVER TRUE

## 2023-05-23 SDOH — ECONOMIC STABILITY: FOOD INSECURITY: WITHIN THE PAST 12 MONTHS, THE FOOD YOU BOUGHT JUST DIDN'T LAST AND YOU DIDN'T HAVE MONEY TO GET MORE.: NEVER TRUE

## 2023-05-23 SDOH — ECONOMIC STABILITY: HOUSING INSECURITY
IN THE LAST 12 MONTHS, WAS THERE A TIME WHEN YOU DID NOT HAVE A STEADY PLACE TO SLEEP OR SLEPT IN A SHELTER (INCLUDING NOW)?: NO

## 2023-05-23 SDOH — ECONOMIC STABILITY: INCOME INSECURITY: HOW HARD IS IT FOR YOU TO PAY FOR THE VERY BASICS LIKE FOOD, HOUSING, MEDICAL CARE, AND HEATING?: NOT HARD AT ALL

## 2023-05-23 ASSESSMENT — PATIENT HEALTH QUESTIONNAIRE - PHQ9
SUM OF ALL RESPONSES TO PHQ QUESTIONS 1-9: 0
2. FEELING DOWN, DEPRESSED OR HOPELESS: 0
SUM OF ALL RESPONSES TO PHQ9 QUESTIONS 1 & 2: 0
SUM OF ALL RESPONSES TO PHQ QUESTIONS 1-9: 0
1. LITTLE INTEREST OR PLEASURE IN DOING THINGS: 0

## 2023-05-23 ASSESSMENT — ENCOUNTER SYMPTOMS
CHEST TIGHTNESS: 0
CONSTIPATION: 0
SHORTNESS OF BREATH: 0
NAUSEA: 0
ABDOMINAL PAIN: 0
VOMITING: 0
WHEEZING: 0
DIARRHEA: 0
COUGH: 0

## 2023-05-23 NOTE — PROGRESS NOTES
Chief Complaint   Patient presents with    Other     Forms filled out         1. \"Have you been to the ER, urgent care clinic since your last visit? Hospitalized since your last visit? \"             no    2. \"Have you seen or consulted any other health care providers outside of the [address] [place] [address] since your last visit? \"               no    3. For patients aged 39-70: Has the patient had a colonoscopy / FIT/ Cologuard? Yes      If the patient is female:    4. For patients aged 41-77: Has the patient had a mammogram within the past 2 years? N/a      5. For patients aged 21-65: Has the patient had a pap smear? N/a    PHQ-9  5/23/2023   Little interest or pleasure in doing things 0   Little interest or pleasure in doing things -   Feeling down, depressed, or hopeless 0   Trouble falling or staying asleep, or sleeping too much -   Trouble falling or staying asleep, or sleeping too much -   Feeling tired or having little energy -   Feeling tired or having little energy -   Poor appetite or overeating -   Poor appetite, weight loss, or overeating -   Feeling bad about yourself - or that you are a failure or have let yourself or your family down -   Feeling bad about yourself - or that you are a failure or have let yourself or your family down -   Trouble concentrating on things, such as reading the newspaper or watching television -   Trouble concentrating on things such as school, work, reading, or watching TV -   Moving or speaking so slowly that other people could have noticed.  Or the opposite - being so fidgety or restless that you have been moving around a lot more than usual -   Moving or speaking so slowly that other people could have noticed; or the opposite being so fidgety that others notice -   Thoughts of being better off dead, or hurting yourself in some way -   PHQ-2 Score 0   Total Score PHQ 2 -   PHQ-9 Total Score 0   PHQ 9 Score -   How difficult have these problems made it for you to do your
Appearance: Normal appearance. HENT:      Head: Normocephalic and atraumatic. Eyes:      Extraocular Movements: Extraocular movements intact. Conjunctiva/sclera: Conjunctivae normal.      Pupils: Pupils are equal, round, and reactive to light. Cardiovascular:      Rate and Rhythm: Normal rate and regular rhythm. Pulses: Normal pulses. Heart sounds: Normal heart sounds. No murmur heard. No friction rub. No gallop. Pulmonary:      Effort: Pulmonary effort is normal. No respiratory distress. Breath sounds: Normal breath sounds. No stridor. No wheezing, rhonchi or rales. Musculoskeletal:      Cervical back: Normal range of motion. Skin:     General: Skin is warm. Findings: No rash. Neurological:      General: No focal deficit present. Mental Status: He is alert and oriented to person, place, and time. Mental status is at baseline. ASSESSMENT AND PLAN  Evan Chun is a 75 y.o. male who presents today for:    1. Encounter for completion of form with patient  Forms filled out for pt. No orders of the defined types were placed in this encounter. Medications Discontinued During This Encounter   Medication Reason    amoxicillin (AMOXIL) 500 MG capsule LIST CLEANUP    azithromycin (ZITHROMAX) 250 MG tablet LIST CLEANUP    buPROPion (WELLBUTRIN XL) 150 MG extended release tablet LIST CLEANUP    docusate (COLACE, DULCOLAX) 100 MG CAPS LIST CLEANUP    latanoprost (XALATAN) 0.005 % ophthalmic solution LIST CLEANUP    memantine (NAMENDA) 10 MG tablet LIST CLEANUP    traMADol (ULTRAM) 50 MG tablet LIST CLEANUP    metoprolol succinate (TOPROL XL) 25 MG extended release tablet LIST CLEANUP    memantine ER (NAMENDA XR) 7 MG CP24 extended release capsule LIST CLEANUP       No follow-ups on file. Treatment risks/benefits/costs/interactions/alternatives discussed with patient.   Advised patient to call back or return to office if symptoms worsen/change/persist. If

## 2023-05-29 DIAGNOSIS — F51.05 INSOMNIA DUE TO OTHER MENTAL DISORDER (CODE): ICD-10-CM

## 2023-05-30 RX ORDER — TRAZODONE HYDROCHLORIDE 50 MG/1
TABLET ORAL
Qty: 90 TABLET | Refills: 1 | Status: SHIPPED | OUTPATIENT
Start: 2023-05-30

## 2023-06-12 ENCOUNTER — TELEPHONE (OUTPATIENT)
Age: 73
End: 2023-06-12

## 2023-06-12 NOTE — TELEPHONE ENCOUNTER
Charli Mai from [facility] receive faxed stated they are missing page 2 and 3 medication .     Requesting a call back    [facility] [[email or web address]] call back #[phone]

## 2023-07-31 ENCOUNTER — TELEPHONE (OUTPATIENT)
Age: 73
End: 2023-07-31

## 2023-07-31 NOTE — TELEPHONE ENCOUNTER
Chief Complaint   Patient presents with    Other     Liquid IV hydration multiplier     Called Patient. Spoke with wife as per PHI. Name and  confirmed. She stated that liquid IV hydration multiplier is medically necessary according to cardiologist.       Cassidy Coates order in your basket.

## 2023-08-07 DIAGNOSIS — G31.84 MILD COGNITIVE IMPAIRMENT OF UNCERTAIN OR UNKNOWN ETIOLOGY: ICD-10-CM

## 2023-08-07 RX ORDER — DONEPEZIL HYDROCHLORIDE 10 MG/1
TABLET, FILM COATED ORAL
Qty: 90 TABLET | Refills: 1 | Status: SHIPPED | OUTPATIENT
Start: 2023-08-07

## 2023-08-07 NOTE — TELEPHONE ENCOUNTER
PCP: Alexandria Cope MD    Last appt: 5/23/2023       Future Appointments   Date Time Provider [address]   4/15/2024  1:00 PM MD BRANDY Felix AMB       Requested Prescriptions     Pending Prescriptions Disp Refills    donepezil (ARICEPT) 10 MG tablet [Pharmacy Med Name: DONEPEZIL HCL 10 MG TABLET] 90 tablet 1     Sig: TAKE 1 TABLET BY MOUTH EVERY DAY AT NIGHT       Prior labs and Blood pressures:  BP Readings from Last 3 Encounters:   05/23/23 125/75   05/05/23 137/79   04/17/23 (!) 140/80     Lab Results   Component Value Date/Time     03/31/2023 11:26 AM    K 4.2 03/31/2023 11:26 AM     03/31/2023 11:26 AM    CO2 30 03/31/2023 11:26 AM    BUN 16 03/31/2023 11:26 AM    GFRAA >60 08/24/2021 11:16 AM     No results found for: HBA1C, GNL3HWDG  Lab Results   Component Value Date/Time    CHOL 171 03/31/2023 11:26 AM    HDL 64 03/31/2023 11:26 AM     No results found for: VITD3, VD3RIA    Lab Results   Component Value Date/Time    TSH 1.42 08/24/2021 11:16 AM

## 2023-09-11 ENCOUNTER — HOSPITAL ENCOUNTER (EMERGENCY)
Facility: HOSPITAL | Age: 73
Discharge: HOME | End: 2023-09-11
Attending: STUDENT IN AN ORGANIZED HEALTH CARE EDUCATION/TRAINING PROGRAM
Payer: MEDICARE

## 2023-09-11 ENCOUNTER — TELEPHONE (OUTPATIENT)
Age: 73
End: 2023-09-11

## 2023-09-11 ENCOUNTER — APPOINTMENT (OUTPATIENT)
Facility: HOSPITAL | Age: 73
End: 2023-09-11
Payer: MEDICARE

## 2023-09-11 ENCOUNTER — NURSE TRIAGE (OUTPATIENT)
Dept: OTHER | Facility: CLINIC | Age: 73
End: 2023-09-11

## 2023-09-11 VITALS
RESPIRATION RATE: 16 BRPM | TEMPERATURE: 98.5 F | SYSTOLIC BLOOD PRESSURE: 137 MMHG | HEART RATE: 70 BPM | DIASTOLIC BLOOD PRESSURE: 79 MMHG | OXYGEN SATURATION: 97 %

## 2023-09-11 DIAGNOSIS — R55 PRE-SYNCOPE: Primary | ICD-10-CM

## 2023-09-11 LAB
ALBUMIN SERPL-MCNC: 3.8 G/DL (ref 3.5–5)
ALBUMIN/GLOB SERPL: 1.1 (ref 1.1–2.2)
ALP SERPL-CCNC: 78 U/L (ref 45–117)
ALT SERPL-CCNC: 23 U/L (ref 12–78)
ANION GAP SERPL CALC-SCNC: 4 MMOL/L (ref 5–15)
APPEARANCE UR: CLEAR
AST SERPL-CCNC: 18 U/L (ref 15–37)
BACTERIA URNS QL MICRO: NEGATIVE /HPF
BASOPHILS # BLD: 0.1 K/UL (ref 0–0.1)
BASOPHILS NFR BLD: 1 % (ref 0–1)
BILIRUB SERPL-MCNC: 0.5 MG/DL (ref 0.2–1)
BILIRUB UR QL: NEGATIVE
BUN SERPL-MCNC: 18 MG/DL (ref 6–20)
BUN/CREAT SERPL: 17 (ref 12–20)
CALCIUM SERPL-MCNC: 8.8 MG/DL (ref 8.5–10.1)
CAOX CRY URNS QL MICRO: ABNORMAL
CHLORIDE SERPL-SCNC: 112 MMOL/L (ref 97–108)
CO2 SERPL-SCNC: 27 MMOL/L (ref 21–32)
COLOR UR: ABNORMAL
COMMENT:: NORMAL
CREAT SERPL-MCNC: 1.04 MG/DL (ref 0.7–1.3)
DIFFERENTIAL METHOD BLD: ABNORMAL
EKG ATRIAL RATE: 71 BPM
EKG DIAGNOSIS: NORMAL
EKG P AXIS: 55 DEGREES
EKG P-R INTERVAL: 164 MS
EKG Q-T INTERVAL: 392 MS
EKG QRS DURATION: 88 MS
EKG QTC CALCULATION (BAZETT): 425 MS
EKG R AXIS: -10 DEGREES
EKG T AXIS: 50 DEGREES
EKG VENTRICULAR RATE: 71 BPM
EOSINOPHIL # BLD: 0.1 K/UL (ref 0–0.4)
EOSINOPHIL NFR BLD: 1 % (ref 0–7)
EPITH CASTS URNS QL MICRO: ABNORMAL /LPF
ERYTHROCYTE [DISTWIDTH] IN BLOOD BY AUTOMATED COUNT: 14.2 % (ref 11.5–14.5)
GLOBULIN SER CALC-MCNC: 3.4 G/DL (ref 2–4)
GLUCOSE SERPL-MCNC: 98 MG/DL (ref 65–100)
GLUCOSE UR STRIP.AUTO-MCNC: NEGATIVE MG/DL
HCT VFR BLD AUTO: 44.8 % (ref 36.6–50.3)
HGB BLD-MCNC: 14.9 G/DL (ref 12.1–17)
HGB UR QL STRIP: NEGATIVE
IMM GRANULOCYTES # BLD AUTO: 0 K/UL (ref 0–0.04)
IMM GRANULOCYTES NFR BLD AUTO: 0 % (ref 0–0.5)
KETONES UR QL STRIP.AUTO: NEGATIVE MG/DL
LEUKOCYTE ESTERASE UR QL STRIP.AUTO: NEGATIVE
LYMPHOCYTES # BLD: 1.6 K/UL (ref 0.8–3.5)
LYMPHOCYTES NFR BLD: 15 % (ref 12–49)
MAGNESIUM SERPL-MCNC: 2 MG/DL (ref 1.6–2.4)
MCH RBC QN AUTO: 31.7 PG (ref 26–34)
MCHC RBC AUTO-ENTMCNC: 33.3 G/DL (ref 30–36.5)
MCV RBC AUTO: 95.3 FL (ref 80–99)
MONOCYTES # BLD: 1 K/UL (ref 0–1)
MONOCYTES NFR BLD: 9 % (ref 5–13)
MUCOUS THREADS URNS QL MICRO: ABNORMAL /LPF
NEUTS SEG # BLD: 8.2 K/UL (ref 1.8–8)
NEUTS SEG NFR BLD: 74 % (ref 32–75)
NITRITE UR QL STRIP.AUTO: NEGATIVE
NRBC # BLD: 0 K/UL (ref 0–0.01)
NRBC BLD-RTO: 0 PER 100 WBC
PH UR STRIP: 5 (ref 5–8)
PLATELET # BLD AUTO: 224 K/UL (ref 150–400)
PMV BLD AUTO: 10.5 FL (ref 8.9–12.9)
POTASSIUM SERPL-SCNC: 3.8 MMOL/L (ref 3.5–5.1)
PROT SERPL-MCNC: 7.2 G/DL (ref 6.4–8.2)
PROT UR STRIP-MCNC: NEGATIVE MG/DL
RBC # BLD AUTO: 4.7 M/UL (ref 4.1–5.7)
RBC #/AREA URNS HPF: ABNORMAL /HPF (ref 0–5)
SODIUM SERPL-SCNC: 143 MMOL/L (ref 136–145)
SP GR UR REFRACTOMETRY: 1.03 (ref 1–1.03)
SPECIMEN HOLD: NORMAL
SPECIMEN HOLD: NORMAL
UROBILINOGEN UR QL STRIP.AUTO: 0.2 EU/DL (ref 0.2–1)
WBC # BLD AUTO: 11 K/UL (ref 4.1–11.1)
WBC URNS QL MICRO: ABNORMAL /HPF (ref 0–4)

## 2023-09-11 PROCEDURE — 99284 EMERGENCY DEPT VISIT MOD MDM: CPT

## 2023-09-11 PROCEDURE — 81001 URINALYSIS AUTO W/SCOPE: CPT

## 2023-09-11 PROCEDURE — 70450 CT HEAD/BRAIN W/O DYE: CPT

## 2023-09-11 PROCEDURE — 93005 ELECTROCARDIOGRAM TRACING: CPT

## 2023-09-11 PROCEDURE — 83735 ASSAY OF MAGNESIUM: CPT

## 2023-09-11 PROCEDURE — 36415 COLL VENOUS BLD VENIPUNCTURE: CPT

## 2023-09-11 PROCEDURE — 85025 COMPLETE CBC W/AUTO DIFF WBC: CPT

## 2023-09-11 PROCEDURE — 80053 COMPREHEN METABOLIC PANEL: CPT

## 2023-09-11 ASSESSMENT — PAIN SCALES - GENERAL: PAINLEVEL_OUTOF10: 0

## 2023-09-11 NOTE — TELEPHONE ENCOUNTER
Micaela De Dios (nurse) called, transferred pt for second level triage. States Saturday the pt fell and hit his head and knocked unconscious. He went to the ED and received 3 stiches. Pt's wife said he is worsening, whenever he stand he feels like he is going to pass out. Transferred call to nurse Berhane Singletary.

## 2023-09-11 NOTE — ED TRIAGE NOTES
Pt fell Sunday, seen at [facility] and discharged, family stated he looked like he was going to pass out again, pt unsure why he fell, denies cp, abd pain, n/v, headache , fever, or dizziness, pt denies any symptoms, denies any symptoms , no blood thinners, denies urinary symptoms

## 2023-09-11 NOTE — ED PROVIDER NOTES
of head showed no acute intracranial findings. No evidence of UTI on UA. Discussed results with patient and his wife and recommended admission as this was his second episode of syncope/ near syncope. Patient verbalized that he did not wish to stay in the hospital. I discussed the option of close follow-up with cardiology with the patient and his wife who felt comfortable with discharge today as they will be able to see his cardiologist, neurologist and PCP for outpatient follow-up without difficulty. Patient and wife given strict return to ED instructions. Discussed my clinical impression(s), any labs and/or radiology results with the patient. I answered any questions and addressed any concerns. Discussed the importance of following up with their primary care physician and/or specialist(s). Discussed signs or symptoms that would warrant return back to the ER for further evaluation. The patient is agreeable with discharge. Amount and/or Complexity of Data Reviewed  Labs: ordered. Decision-making details documented in ED Course. Radiology: ordered. ECG/medicine tests: ordered. Discussion of management or test interpretation with external provider(s): Presentation, management, and disposition were discussed with the attending physician, Dr. Lindsay Sanon, who is in agreement with plan of care.               REASSESSMENT     ED Course as of 09/11/23 2220   Mon Sep 11, 2023   1516 CMP(!):    Sodium 143   Potassium 3.8   Chloride 112(!)   CO2 27   Anion Gap 4(!)   Glucose, Random 98   BUN,BUNPL 18   Creatinine 1.04   Bun/Cre Ratio 17   Est, Glom Filt Rate >60   CALCIUM, SERUM, 081503 8.8   BILIRUBIN TOTAL 0.5   ALT 23   AST 18   Alk Phos 78   Total Protein 7.2   Albumin 3.8   Globulin 3.4   ALBUMIN/GLOBULIN RATIO 1.1  Normal  [TR]   1516 CBC with Auto Differential(!):    WBC 11.0   RBC 4.70   Hemoglobin Quant 14.9   Hematocrit 44.8   MCV 95.3   MCH 31.7   MCHC 33.3   RDW 14.2   Platelet Count 709   MPV 10.5

## 2023-09-11 NOTE — DISCHARGE INSTRUCTIONS
Thank you for allowing us to provide you with medical care today. We realize that you have many choices for your emergency care needs. We thank you for choosing [facility]. Please choose us in the future for any continued health care needs. The exam and treatment you received in the Emergency Department were for an emergent problem and are not intended as complete care. It is important that you follow up with a doctor, nurse practitioner, or physician's assistant for ongoing care. If your symptoms worsen or you do not improve as expected and you are unable to reach your usual health care provider, you should return to the Emergency Department. We are available 24 hours a day. Please make an appointment with your health care provider(s) for follow up of your Emergency Department visit. Take this sheet with you when you go to your follow-up visit.

## 2023-09-11 NOTE — TELEPHONE ENCOUNTER
Spoke to patient, two ID confirmed. Symptoms of  confirmed at this time and also updated on fall and going to [facility] Dr. Rusty Hi on Saturday. Writer informed Patient to call [facility] and follow up with them. But if the symptoms get worse please call 911 since she is not able to transport him. Pt verbalized understanding of information discussed w/ no further questions at this time.    Tod Sheikh LPN

## 2023-09-14 ENCOUNTER — OFFICE VISIT (OUTPATIENT)
Age: 73
End: 2023-09-14
Payer: MEDICARE

## 2023-09-14 VITALS
HEIGHT: 72 IN | OXYGEN SATURATION: 98 % | HEART RATE: 67 BPM | RESPIRATION RATE: 14 BRPM | BODY MASS INDEX: 24.35 KG/M2 | WEIGHT: 179.8 LBS | TEMPERATURE: 97.3 F | DIASTOLIC BLOOD PRESSURE: 80 MMHG | SYSTOLIC BLOOD PRESSURE: 132 MMHG

## 2023-09-14 DIAGNOSIS — J01.90 ACUTE SINUSITIS, RECURRENCE NOT SPECIFIED, UNSPECIFIED LOCATION: ICD-10-CM

## 2023-09-14 DIAGNOSIS — Z91.81 RISK FOR FALLS: ICD-10-CM

## 2023-09-14 DIAGNOSIS — R26.81 GAIT INSTABILITY: ICD-10-CM

## 2023-09-14 DIAGNOSIS — Z48.02 ENCOUNTER FOR REMOVAL OF SUTURES: Primary | ICD-10-CM

## 2023-09-14 DIAGNOSIS — F03.B0 MODERATE DEMENTIA, UNSPECIFIED DEMENTIA TYPE, UNSPECIFIED WHETHER BEHAVIORAL, PSYCHOTIC, OR MOOD DISTURBANCE OR ANXIETY (HCC): ICD-10-CM

## 2023-09-14 DIAGNOSIS — S09.90XD TRAUMATIC INJURY OF HEAD, SUBSEQUENT ENCOUNTER: ICD-10-CM

## 2023-09-14 PROCEDURE — 1123F ACP DISCUSS/DSCN MKR DOCD: CPT | Performed by: FAMILY MEDICINE

## 2023-09-14 PROCEDURE — 99214 OFFICE O/P EST MOD 30 MIN: CPT | Performed by: FAMILY MEDICINE

## 2023-09-14 PROCEDURE — G8420 CALC BMI NORM PARAMETERS: HCPCS | Performed by: FAMILY MEDICINE

## 2023-09-14 PROCEDURE — G8427 DOCREV CUR MEDS BY ELIG CLIN: HCPCS | Performed by: FAMILY MEDICINE

## 2023-09-14 PROCEDURE — 1036F TOBACCO NON-USER: CPT | Performed by: FAMILY MEDICINE

## 2023-09-14 PROCEDURE — 3017F COLORECTAL CA SCREEN DOC REV: CPT | Performed by: FAMILY MEDICINE

## 2023-09-14 RX ORDER — DOXYCYCLINE 100 MG/1
100 TABLET ORAL 2 TIMES DAILY
Qty: 14 TABLET | Refills: 0 | Status: SHIPPED | OUTPATIENT
Start: 2023-09-14 | End: 2023-09-21
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
## 2023-10-02 ENCOUNTER — HOSPITAL ENCOUNTER (OUTPATIENT)
Age: 73
Discharge: HOME | End: 2023-10-05
Payer: MEDICARE

## 2023-10-02 VITALS — BODY MASS INDEX: 23.73 KG/M2 | WEIGHT: 175 LBS

## 2023-10-02 DIAGNOSIS — S09.90XD TRAUMATIC INJURY OF HEAD, SUBSEQUENT ENCOUNTER: ICD-10-CM

## 2023-10-02 DIAGNOSIS — F03.B0 MODERATE DEMENTIA, UNSPECIFIED DEMENTIA TYPE, UNSPECIFIED WHETHER BEHAVIORAL, PSYCHOTIC, OR MOOD DISTURBANCE OR ANXIETY (HCC): ICD-10-CM

## 2023-10-02 PROCEDURE — 70553 MRI BRAIN STEM W/O & W/DYE: CPT

## 2023-10-02 PROCEDURE — A9579 GAD-BASE MR CONTRAST NOS,1ML: HCPCS | Performed by: RADIOLOGY

## 2023-10-02 PROCEDURE — 6360000004 HC RX CONTRAST MEDICATION: Performed by: RADIOLOGY

## 2023-10-02 RX ADMIN — GADOTERIDOL 16 ML: 279.3 INJECTION, SOLUTION INTRAVENOUS at 13:54

## 2023-10-03 ENCOUNTER — TELEPHONE (OUTPATIENT)
Age: 73
End: 2023-10-03

## 2023-10-03 DIAGNOSIS — J34.9 SINUS DISEASE: Primary | ICD-10-CM

## 2023-10-03 NOTE — TELEPHONE ENCOUNTER
Spoke with pt's wife Salvador Leiva. She stated as of Sunday October 1st Mr. David Cooper experienced a fall, and dispatch was called out to assist. She also says he's had a severe ear infection,had fluid drown out his ears yesterday 10/2/2023, and was prescribed antibiotics. She would like to avoid the ER again, and wants a OV appt within the next 3-5 if possible.  Best call back number [phone]

## 2023-10-06 ENCOUNTER — TELEMEDICINE (OUTPATIENT)
Age: 73
End: 2023-10-06
Payer: MEDICARE

## 2023-10-06 DIAGNOSIS — H66.90 EAR INFECTION: ICD-10-CM

## 2023-10-06 DIAGNOSIS — J34.9 SINUS DISEASE: Primary | ICD-10-CM

## 2023-10-06 PROCEDURE — 3017F COLORECTAL CA SCREEN DOC REV: CPT | Performed by: FAMILY MEDICINE

## 2023-10-06 PROCEDURE — G8427 DOCREV CUR MEDS BY ELIG CLIN: HCPCS | Performed by: FAMILY MEDICINE

## 2023-10-06 PROCEDURE — 99213 OFFICE O/P EST LOW 20 MIN: CPT | Performed by: FAMILY MEDICINE

## 2023-10-06 PROCEDURE — 1123F ACP DISCUSS/DSCN MKR DOCD: CPT | Performed by: FAMILY MEDICINE

## 2023-10-06 RX ORDER — CIPROFLOXACIN HYDROCHLORIDE 3.5 MG/ML
SOLUTION/ DROPS TOPICAL
COMMUNITY
Start: 2023-10-03

## 2023-10-06 RX ORDER — AMOXICILLIN 875 MG/1
875 TABLET, COATED ORAL EVERY 12 HOURS
COMMUNITY
Start: 2023-10-01

## 2023-10-06 NOTE — PROGRESS NOTES
apparent distress      [] Abnormal -     Mental status: [x] Alert and awake  [x] Oriented to person/place/time [x] Able to follow commands    [] Abnormal -     Eyes:   EOM    [x]  Normal    [] Abnormal -   Sclera  [x]  Normal    [] Abnormal -          Discharge []  None visible   [] Abnormal -     HENT: [x] Normocephalic, atraumatic  [] Abnormal -   [] Mouth/Throat: Mucous membranes are moist    Neck: [x] No visualized mass [] Abnormal -     Pulmonary/Chest: [x] Respiratory effort normal   [x] No visualized signs of difficulty breathing or respiratory distress        [] Abnormal -         Skin:        [x] No significant exanthematous lesions or discoloration noted on facial skin         [] Abnormal -            Psychiatric:       [x] Normal Affect [] Abnormal -        [x] No Hallucinations    Other pertinent observable physical exam findings:    Assessment & Plan:   Mary Pettit is a 75 y.o. male who presents today for:    1. Sinus disease  Recommend pt to see [address] [place] [address] ENT given sinus disease that has been present on prior MRIs. 2. Ear infection  Continue abx as prescribed. No orders of the defined types were placed in this encounter. There are no discontinued medications. Treatment risks/benefits/costs/interactions/alternatives discussed with patient. Advised patient to call back or return to office if symptoms worsen/change/persist. If patient cannot reach us or should anything more severe/urgent arise he/she should proceed directly to the nearest emergency department. Discussed expected course/resolution/complications of diagnosis in detail with patient. Patient expressed understanding with the diagnosis and plan. This dictation may have been completed with Dragon, the [employer] voice recognition software. Unanticipated grammatical, syntax, homophones, and other interpretive errors are sometimes inadvertently transcribed by the computer software.   Please disregard any errors that have escaped

## 2023-10-12 ENCOUNTER — TELEPHONE (OUTPATIENT)
Age: 73
End: 2023-10-12

## 2023-10-12 NOTE — TELEPHONE ENCOUNTER
Chief Complaint   Patient presents with    DNR form      Called Patient. Spoke with patient's wife. Name and  confirmed. Informed her that DNR form is ready for pick it up from our office. Verbalized understanding.

## 2023-10-27 ENCOUNTER — TELEPHONE (OUTPATIENT)
Age: 73
End: 2023-10-27

## 2023-10-27 NOTE — TELEPHONE ENCOUNTER
----- Message from Tripda sent at 10/27/2023 11:32 AM EDT -----  Subject: Message to Provider    QUESTIONS  Information for Provider? I spoke with Dayton Delgado a member of [facility]   adult day services who said that a DNR was faxed over on 10/12 and wanted   to know if the doctor had signed it yet. they would like a call back about   this.   ---------------------------------------------------------------------------  --------------  Roula Perkins  [phone]; OK to leave message on voicemail  ---------------------------------------------------------------------------  --------------  SCRIPT ANSWERS  Relationship to Patient? Covered Entity  Covered Entity Type? Other  Other Covered Entity Type? [address] Day services  Representative Name?  Jacqueline Hopper

## 2023-11-06 ENCOUNTER — TELEPHONE (OUTPATIENT)
Age: 73
End: 2023-11-06

## 2023-11-06 NOTE — TELEPHONE ENCOUNTER
Patient 's wife called stated  has appointment with Dr. Cheung Bring the ENT specialist wants to know was the MRI and labs faxed over.     Requesting a call back    Best call back #[phone]

## 2023-11-23 DIAGNOSIS — F51.05 INSOMNIA DUE TO OTHER MENTAL DISORDER (CODE): ICD-10-CM

## 2023-11-24 RX ORDER — TRAZODONE HYDROCHLORIDE 50 MG/1
TABLET ORAL
Qty: 90 TABLET | Refills: 1 | Status: SHIPPED | OUTPATIENT
Start: 2023-11-24

## 2023-11-24 NOTE — TELEPHONE ENCOUNTER
PCP: Desire Downs MD    Last appt: 10/6/2023       Future Appointments   Date Time Provider [address]   4/15/2024  1:00 PM MD BRANDY Kim AMB       Requested Prescriptions     Pending Prescriptions Disp Refills    traZODone (DESYREL) 50 MG tablet [Pharmacy Med Name: TRAZODONE 50 MG TABLET] 90 tablet 1     Sig: TAKE 1 TABLET BY MOUTH NIGHTLY       Prior labs and Blood pressures:  BP Readings from Last 3 Encounters:   09/14/23 132/80   09/11/23 137/79   05/23/23 125/75     Lab Results   Component Value Date/Time     09/11/2023 01:56 PM    K 3.8 09/11/2023 01:56 PM     09/11/2023 01:56 PM    CO2 27 09/11/2023 01:56 PM    BUN 18 09/11/2023 01:56 PM    GFRAA >60 08/24/2021 11:16 AM     No results found for: \"HBA1C\", \"[id number]\"  Lab Results   Component Value Date/Time    CHOL 171 03/31/2023 11:26 AM    HDL 64 03/31/2023 11:26 AM     No results found for: \"VITD3\", \"VD3RIA\"    Lab Results   Component Value Date/Time    TSH 1.42 08/24/2021 11:16 AM

## 2023-11-27 ENCOUNTER — TELEPHONE (OUTPATIENT)
Age: 73
End: 2023-11-27

## 2023-11-27 DIAGNOSIS — F03.B18 MODERATE DEMENTIA WITH OTHER BEHAVIORAL DISTURBANCE, UNSPECIFIED DEMENTIA TYPE (HCC): Primary | ICD-10-CM

## 2023-11-27 DIAGNOSIS — R35.0 URINARY FREQUENCY: ICD-10-CM
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
## 2023-11-27 NOTE — TELEPHONE ENCOUNTER
Are there forms that she needs to have filled out? Not sure what documentation is needed for the facility. Is she feeling safe at home currently?
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
## 2023-11-29 DIAGNOSIS — R35.0 URINARY FREQUENCY: ICD-10-CM

## 2023-11-30 LAB
BACTERIA SPEC CULT: NORMAL
SERVICE CMNT-IMP: NORMAL

## 2023-12-05 ENCOUNTER — TELEPHONE (OUTPATIENT)
Age: 73
End: 2023-12-05

## 2023-12-05 DIAGNOSIS — L60.8 TOENAIL DEFORMITY: Primary | ICD-10-CM

## 2023-12-05 NOTE — TELEPHONE ENCOUNTER
Patient 's wife called wants to know if provider could refer her  to a podiatry  his toe nails have turned black.     Requesting a call back    Best call back #[phone]

## 2023-12-08 ENCOUNTER — TELEMEDICINE (OUTPATIENT)
Age: 73
End: 2023-12-08
Payer: MEDICARE

## 2023-12-08 DIAGNOSIS — R35.0 BENIGN PROSTATIC HYPERPLASIA WITH URINARY FREQUENCY: ICD-10-CM

## 2023-12-08 DIAGNOSIS — L60.8 TOENAIL DEFORMITY: Primary | ICD-10-CM

## 2023-12-08 DIAGNOSIS — N40.1 BENIGN PROSTATIC HYPERPLASIA WITH URINARY FREQUENCY: ICD-10-CM

## 2023-12-08 PROCEDURE — G8428 CUR MEDS NOT DOCUMENT: HCPCS | Performed by: FAMILY MEDICINE

## 2023-12-08 PROCEDURE — 3017F COLORECTAL CA SCREEN DOC REV: CPT | Performed by: FAMILY MEDICINE

## 2023-12-08 PROCEDURE — 1123F ACP DISCUSS/DSCN MKR DOCD: CPT | Performed by: FAMILY MEDICINE

## 2023-12-08 PROCEDURE — 99214 OFFICE O/P EST MOD 30 MIN: CPT | Performed by: FAMILY MEDICINE

## 2023-12-08 RX ORDER — TAMSULOSIN HYDROCHLORIDE 0.4 MG/1
0.8 CAPSULE ORAL DAILY
Qty: 180 CAPSULE | Refills: 2 | Status: SHIPPED | OUTPATIENT
Start: 2023-12-08

## 2023-12-08 NOTE — PROGRESS NOTES
Paula Grimes, was evaluated through a synchronous (real-time) audio-video encounter. The patient (or guardian if applicable) is aware that this is a billable service, which includes applicable co-pays. This Virtual Visit was conducted with patient's (and/or legal guardian's) consent. Patient identification was verified, and a caregiver was present when appropriate. The patient was located at Home: [address] [facility] [place] [address]  Provider was located at [facility] ([address] [place] [address]): [address] [facility],  [address] [place]      Micaela Kendrick MD    Subjective:   Paula Grimes is a 75 y.o. male who was seen for:    Wife is present. He has had black toenails for the past year that do not seem to be healing. Wife initially thought it was a bruise but nails are not improving. Patient does not seem to be bothered by symptoms. His adult  center states that patient goes to urinate about 10 times a day. He also has had increased urinary accidents at night. Currently on Flomax 0.4 mg daily for BPH. Recent urine culture was negative. Current Outpatient Medications on File Prior to Visit   Medication Sig Dispense Refill    traZODone (DESYREL) 50 MG tablet TAKE 1 TABLET BY MOUTH NIGHTLY 90 tablet 1    ciprofloxacin (CILOXAN) 0.3 % ophthalmic solution TAKE 5 DROP(S) OTIC TWO TIMES A DAY TO THE AFFECT EAR(S)      donepezil (ARICEPT) 10 MG tablet TAKE 1 TABLET BY MOUTH EVERY DAY AT NIGHT 90 tablet 1    QUEtiapine (SEROQUEL) 25 MG tablet Take 1 tablet by mouth daily      tamsulosin (FLOMAX) 0.4 MG capsule TAKE 1 CAPSULE BY MOUTH EVERY DAY       No current facility-administered medications on file prior to visit.        No Known Allergies    Review of Systems   Unable to perform ROS: Dementia       Objective:          No data to display                 Constitutional: [x] Appears well-developed and well-nourished [x] No apparent distress      [] Abnormal -     Mental status: [x] Alert and awake  [x] Oriented to

## 2024-01-16 ENCOUNTER — APPOINTMENT (OUTPATIENT)
Facility: HOSPITAL | Age: 74
End: 2024-01-16
Payer: MEDICARE

## 2024-01-16 ENCOUNTER — HOSPITAL ENCOUNTER (EMERGENCY)
Facility: HOSPITAL | Age: 74
Discharge: HOME | End: 2024-01-16
Attending: STUDENT IN AN ORGANIZED HEALTH CARE EDUCATION/TRAINING PROGRAM
Payer: MEDICARE

## 2024-01-16 VITALS
WEIGHT: 190.92 LBS | OXYGEN SATURATION: 96 % | TEMPERATURE: 97.8 F | SYSTOLIC BLOOD PRESSURE: 141 MMHG | RESPIRATION RATE: 16 BRPM | HEART RATE: 68 BPM | BODY MASS INDEX: 25.89 KG/M2 | DIASTOLIC BLOOD PRESSURE: 92 MMHG

## 2024-01-16 DIAGNOSIS — M25.522 LEFT ELBOW PAIN: Primary | ICD-10-CM

## 2024-01-16 PROCEDURE — 6370000000 HC RX 637 (ALT 250 FOR IP): Performed by: STUDENT IN AN ORGANIZED HEALTH CARE EDUCATION/TRAINING PROGRAM

## 2024-01-16 PROCEDURE — 93971 EXTREMITY STUDY: CPT

## 2024-01-16 PROCEDURE — 73070 X-RAY EXAM OF ELBOW: CPT

## 2024-01-16 PROCEDURE — 6360000002 HC RX W HCPCS: Performed by: STUDENT IN AN ORGANIZED HEALTH CARE EDUCATION/TRAINING PROGRAM

## 2024-01-16 PROCEDURE — 99284 EMERGENCY DEPT VISIT MOD MDM: CPT

## 2024-01-16 PROCEDURE — 96372 THER/PROPH/DIAG INJ SC/IM: CPT

## 2024-01-16 RX ORDER — ACETAMINOPHEN 500 MG
500 TABLET ORAL 4 TIMES DAILY PRN
Qty: 120 TABLET | Refills: 0 | COMMUNITY
Start: 2024-01-16

## 2024-01-16 RX ORDER — ACETAMINOPHEN 500 MG
500 TABLET ORAL 4 TIMES DAILY PRN
Qty: 120 TABLET | Refills: 0 | COMMUNITY
Start: 2024-01-16 | End: 2024-01-16

## 2024-01-16 RX ORDER — ACETAMINOPHEN 325 MG/1
500 TABLET ORAL 4 TIMES DAILY PRN
Qty: 12 TABLET | Refills: 0 | Status: SHIPPED | OUTPATIENT
Start: 2024-01-16 | End: 2024-01-16 | Stop reason: SDUPTHER

## 2024-01-16 RX ORDER — IBUPROFEN 600 MG/1
600 TABLET ORAL 4 TIMES DAILY PRN
Qty: 40 TABLET | Refills: 0 | Status: SHIPPED | OUTPATIENT
Start: 2024-01-16

## 2024-01-16 RX ORDER — HYDROCODONE BITARTRATE AND ACETAMINOPHEN 5; 325 MG/1; MG/1
1 TABLET ORAL
Status: COMPLETED | OUTPATIENT
Start: 2024-01-16 | End: 2024-01-16

## 2024-01-16 RX ORDER — CYCLOBENZAPRINE HCL 5 MG
5 TABLET ORAL 2 TIMES DAILY PRN
Qty: 10 TABLET | Refills: 0 | Status: SHIPPED | OUTPATIENT
Start: 2024-01-16 | End: 2024-01-26

## 2024-01-16 RX ORDER — CYCLOBENZAPRINE HCL 10 MG
5 TABLET ORAL ONCE
Status: COMPLETED | OUTPATIENT
Start: 2024-01-16 | End: 2024-01-16

## 2024-01-16 RX ORDER — KETOROLAC TROMETHAMINE 30 MG/ML
15 INJECTION, SOLUTION INTRAMUSCULAR; INTRAVENOUS
Status: COMPLETED | OUTPATIENT
Start: 2024-01-16 | End: 2024-01-16

## 2024-01-16 RX ADMIN — KETOROLAC TROMETHAMINE 15 MG: 30 INJECTION INTRAMUSCULAR; INTRAVENOUS at 14:02

## 2024-01-16 RX ADMIN — HYDROCODONE BITARTRATE AND ACETAMINOPHEN 1 TABLET: 5; 325 TABLET ORAL at 14:03

## 2024-01-16 RX ADMIN — CYCLOBENZAPRINE 5 MG: 10 TABLET, FILM COATED ORAL at 14:03

## 2024-01-16 ASSESSMENT — PAIN - FUNCTIONAL ASSESSMENT: PAIN_FUNCTIONAL_ASSESSMENT: NONE - DENIES PAIN

## 2024-01-16 NOTE — ED TRIAGE NOTES
Pt arrives via EMS with LEFT elbow pain that started today. Hx of dementia, pt is poor historian.

## 2024-01-16 NOTE — DISCHARGE INSTRUCTIONS
For muscle spasms and tightness please take Flexeril as instructed.  You may also apply ice alternating ice and heat for pain control.  Please move elbow as able to prevent stiffening of the joint.  For pain management, both Tylenol and ibuprofen (motrin) can be taken. They have a different chemical composition and may give more relief together than can be provided using either alone.  How to alternate Ibuprofen and Tylenol:  ? With food, You will take a dose of pain medication every three hours.  ? Start by taking 500 mg of Tylenol   ? 3 hours later take 600 mg of Motrin   ? 3 hours after taking the Motrin take 500 mg of Tylenol  ? 3 hours after that take 600 mg of Motrin.   ? You can continue to alternate Ibuprofen and Tylenol, up to the maximum doses of each medicine, but do not exceed the maximum dose of each.  ? Be sure to maintain proper dosing intervals between successive doses of the same medication (at least 4 hours)    Do not take more than 4,000 mg of Tylenol or 3,200 mg of Motrin in a 24-hour period!    If you are having persistent pain for more than a week or requiring more frequent pain medication dosing, please follow-up with your primary care doctor for reevaluation.  If you have redness swelling warmth of the arm or joint please return to the emergency department immediately!    Thank you for letting us take care of you, hope you feel better soon,  Gely Kauffman MD

## 2024-01-16 NOTE — ED PROVIDER NOTES
Social Determinants of Health     Financial Resource Strain: Low Risk  (5/23/2023)    Overall Financial Resource Strain (CARDIA)     Difficulty of Paying Living Expenses: Not hard at all   Transportation Needs: Unknown (5/23/2023)    PRAPARE - Transportation     Lack of Transportation (Non-Medical): No   Housing Stability: Unknown (5/23/2023)    Housing Stability Vital Sign     Unstable Housing in the Last Year: No           PHYSICAL EXAM    (up to 7 for level 4, 8 or more for level 5)     ED Triage Vitals [01/16/24 1240]   BP Temp Temp Source Pulse Respirations SpO2 Height Weight - Scale   (!) 150/87 97.8 °F (36.6 °C) Oral 66 16 98 % -- 86.6 kg (190 lb 14.7 oz)       Body mass index is 25.89 kg/m².    Physical Exam  Vitals reviewed.   Constitutional:       General: He is in acute distress.      Appearance: Normal appearance. He is not toxic-appearing.   HENT:      Head: Normocephalic and atraumatic.      Nose: Nose normal.      Mouth/Throat:      Mouth: Mucous membranes are moist.      Pharynx: Oropharynx is clear.   Eyes:      Conjunctiva/sclera: Conjunctivae normal.   Cardiovascular:      Rate and Rhythm: Normal rate.      Heart sounds: Normal heart sounds.   Pulmonary:      Effort: Pulmonary effort is normal. No respiratory distress.      Breath sounds: Normal breath sounds. No wheezing.   Abdominal:      General: Abdomen is flat. There is no distension.      Palpations: Abdomen is soft.      Tenderness: There is no abdominal tenderness.   Musculoskeletal:         General: No swelling or deformity.      Cervical back: Neck supple.      Comments: Holding left arm in bent position, guarding left elbow, no obvious deformity, no focal tenderness to bony palpation, no soft tissue swelling or erythema, diffuse tenderness to palpation of left forearm   Skin:     General: Skin is warm and dry.      Findings: No erythema or rash.   Neurological:      Mental Status: He is alert. Mental status is at baseline.    Psychiatric:         Mood and Affect: Mood normal.         Behavior: Behavior normal.           DIAGNOSTIC RESULTS     EKG: All EKG's are interpreted by the Emergency Department Physician who either signs or Co-signs this chart in the absence of a cardiologist.        RADIOLOGY:   Non-plain film images such as CT, Ultrasound and MRI are read by the radiologist. Plain radiographic images are visualized and preliminarily interpreted by the emergency physician as documented in ED course.      Interpretation per the Radiologist below, if available at the time of this note:    Vascular duplex upper extremity venous left   Final Result      XR ELBOW LEFT (2 VIEWS)   Final Result   No acute abnormality.           LABS:  Labs Reviewed - No data to display    All other labs were within normal range or not returned as of this dictation.    EMERGENCY DEPARTMENT COURSE and DIFFERENTIAL DIAGNOSIS/MDM:   Vitals:    Vitals:    01/16/24 1240 01/16/24 1400 01/16/24 1500   BP: (!) 150/87 (!) 149/78 (!) 141/92   Pulse: 66  68   Resp: 16  16   Temp: 97.8 °F (36.6 °C)  97.8 °F (36.6 °C)   TempSrc: Oral  Oral   SpO2: 98% 97% 96%   Weight: 86.6 kg (190 lb 14.7 oz)             Medical Decision Making  Patient presenting for left elbow pain starting today, no other associated symptoms, non verbal, no redness, no swelling on exam, diffusely tender.   X-ray shows no effusion do not suspect septic arthritis.  No redness, warmth or focal tenderness to suggest cellulitis.  Diffuse forearm tenderness to palpation, DVT study obtained shows no evidence of clot   Pt given pain medicine in ED and re-evaluation pt moving arm freely, NAD, normal ROM  Likely MSK pain given reassuring Xray and DVT study and reassuring re-evaluation exam.   Advised tylenol and motrin and home and return for progression of pain, change in ROM, swelling or redness.     Amount and/or Complexity of Data Reviewed  Radiology: ordered. Decision-making details documented in ED

## 2024-01-31 ENCOUNTER — TELEPHONE (OUTPATIENT)
Age: 74
End: 2024-01-31

## 2024-01-31 DIAGNOSIS — R35.0 BENIGN PROSTATIC HYPERPLASIA WITH URINARY FREQUENCY: ICD-10-CM

## 2024-01-31 DIAGNOSIS — N40.1 BENIGN PROSTATIC HYPERPLASIA WITH URINARY FREQUENCY: ICD-10-CM

## 2024-01-31 DIAGNOSIS — G31.84 MILD COGNITIVE IMPAIRMENT OF UNCERTAIN OR UNKNOWN ETIOLOGY: ICD-10-CM

## 2024-01-31 RX ORDER — TAMSULOSIN HYDROCHLORIDE 0.4 MG/1
CAPSULE ORAL DAILY
Qty: 90 CAPSULE | Refills: 2 | Status: SHIPPED | OUTPATIENT
Start: 2024-01-31 | End: 2024-01-31 | Stop reason: SDUPTHER

## 2024-01-31 RX ORDER — DONEPEZIL HYDROCHLORIDE 10 MG/1
TABLET, FILM COATED ORAL
Qty: 90 TABLET | Refills: 1 | Status: SHIPPED | OUTPATIENT
Start: 2024-01-31

## 2024-01-31 RX ORDER — TAMSULOSIN HYDROCHLORIDE 0.4 MG/1
0.8 CAPSULE ORAL DAILY
Qty: 180 CAPSULE | Refills: 2 | Status: SHIPPED | OUTPATIENT
Start: 2024-01-31

## 2024-01-31 NOTE — TELEPHONE ENCOUNTER
Called Patient. Spoke with wife. Name and  confirmed.  Informed her that Rx was sent. Verbalized understanding.

## 2024-01-31 NOTE — TELEPHONE ENCOUNTER
Liza pt's wife called. States  changed the tamsulosin from 1 a day to 2 a day so the pt is running out of med. [facility] said they have the old Rx and cannot refill until they get Rx for twice a day.      BCB# [phone]

## 2024-01-31 NOTE — TELEPHONE ENCOUNTER
PCP: Yara Moraes MD    Last appt: 12/8/2023       Future Appointments   Date Time Provider Department Center   4/15/2024  1:00 PM James Boucher MD [facility]       Requested Prescriptions     Pending Prescriptions Disp Refills    donepezil (ARICEPT) 10 MG tablet [Pharmacy Med Name: DONEPEZIL HCL 10 MG TABLET] 90 tablet 1     Sig: TAKE 1 TABLET BY MOUTH EVERY DAY AT NIGHT       Prior labs and Blood pressures:  BP Readings from Last 3 Encounters:   01/16/24 (!) 141/92   09/14/23 132/80   09/11/23 137/79     Lab Results   Component Value Date/Time     09/11/2023 01:56 PM    K 3.8 09/11/2023 01:56 PM     09/11/2023 01:56 PM    CO2 27 09/11/2023 01:56 PM    BUN 18 09/11/2023 01:56 PM    GFRAA >60 08/24/2021 11:16 AM     No results found for: \"HBA1C\", \"[id number]\"  Lab Results   Component Value Date/Time    CHOL 171 03/31/2023 11:26 AM    HDL 64 03/31/2023 11:26 AM     No results found for: \"VITD3\", \"VD3RIA\"    Lab Results   Component Value Date/Time    TSH 1.42 08/24/2021 11:16 AM

## 2024-01-31 NOTE — TELEPHONE ENCOUNTER
PCP: Yara Moraes MD    Last appt: 12/8/2023       Future Appointments   Date Time Provider Department Center   4/15/2024  1:00 PM James Boucher MD [facility]       Requested Prescriptions     Pending Prescriptions Disp Refills    tamsulosin (FLOMAX) 0.4 MG capsule [Pharmacy Med Name: TAMSULOSIN HCL 0.4 MG CAPSULE] 90 capsule 2     Sig: TAKE 1 CAPSULE BY MOUTH EVERY DAY       Prior labs and Blood pressures:  BP Readings from Last 3 Encounters:   01/16/24 (!) 141/92   09/14/23 132/80   09/11/23 137/79     Lab Results   Component Value Date/Time     09/11/2023 01:56 PM    K 3.8 09/11/2023 01:56 PM     09/11/2023 01:56 PM    CO2 27 09/11/2023 01:56 PM    BUN 18 09/11/2023 01:56 PM    GFRAA >60 08/24/2021 11:16 AM     No results found for: \"HBA1C\", \"XAL2AUOG\"  Lab Results   Component Value Date/Time    CHOL 171 03/31/2023 11:26 AM    HDL 64 03/31/2023 11:26 AM     No results found for: \"VITD3\", \"VD3RIA\"    Lab Results   Component Value Date/Time    TSH 1.42 08/24/2021 11:16 AM

## 2024-02-01 ENCOUNTER — APPOINTMENT (OUTPATIENT)
Facility: HOSPITAL | Age: 74
End: 2024-02-01
Payer: MEDICARE

## 2024-02-01 ENCOUNTER — HOSPITAL ENCOUNTER (OUTPATIENT)
Facility: HOSPITAL | Age: 74
Setting detail: OBSERVATION
Discharge: HOME | End: 2024-02-02
Attending: EMERGENCY MEDICINE | Admitting: STUDENT IN AN ORGANIZED HEALTH CARE EDUCATION/TRAINING PROGRAM
Payer: MEDICARE

## 2024-02-01 DIAGNOSIS — R25.1 TREMOR: ICD-10-CM

## 2024-02-01 DIAGNOSIS — K04.7 DENTAL INFECTION: ICD-10-CM

## 2024-02-01 DIAGNOSIS — R41.82 ALTERED MENTAL STATUS, UNSPECIFIED ALTERED MENTAL STATUS TYPE: Primary | ICD-10-CM

## 2024-02-01 LAB
ALBUMIN SERPL-MCNC: 3.8 G/DL (ref 3.5–5)
ALBUMIN/GLOB SERPL: 1 (ref 1.1–2.2)
ALP SERPL-CCNC: 73 U/L (ref 45–117)
ALT SERPL-CCNC: 23 U/L (ref 12–78)
ANION GAP SERPL CALC-SCNC: 8 MMOL/L (ref 5–15)
APPEARANCE UR: CLEAR
AST SERPL-CCNC: 19 U/L (ref 15–37)
BACTERIA URNS QL MICRO: NEGATIVE /HPF
BASOPHILS # BLD: 0.1 K/UL (ref 0–0.1)
BASOPHILS NFR BLD: 1 % (ref 0–1)
BILIRUB SERPL-MCNC: 0.6 MG/DL (ref 0.2–1)
BILIRUB UR QL: NEGATIVE
BUN SERPL-MCNC: 18 MG/DL (ref 6–20)
BUN/CREAT SERPL: 17 (ref 12–20)
CALCIUM SERPL-MCNC: 9.1 MG/DL (ref 8.5–10.1)
CHLORIDE SERPL-SCNC: 106 MMOL/L (ref 97–108)
CO2 SERPL-SCNC: 24 MMOL/L (ref 21–32)
COLOR UR: NORMAL
COMMENT:: NORMAL
CREAT SERPL-MCNC: 1.09 MG/DL (ref 0.7–1.3)
DIFFERENTIAL METHOD BLD: ABNORMAL
EKG ATRIAL RATE: 70 BPM
EKG DIAGNOSIS: NORMAL
EKG P AXIS: 62 DEGREES
EKG P-R INTERVAL: 178 MS
EKG Q-T INTERVAL: 398 MS
EKG QRS DURATION: 90 MS
EKG QTC CALCULATION (BAZETT): 429 MS
EKG R AXIS: -6 DEGREES
EKG T AXIS: 70 DEGREES
EKG VENTRICULAR RATE: 70 BPM
EOSINOPHIL # BLD: 0.2 K/UL (ref 0–0.4)
EOSINOPHIL NFR BLD: 2 % (ref 0–7)
EPITH CASTS URNS QL MICRO: NORMAL /LPF
ERYTHROCYTE [DISTWIDTH] IN BLOOD BY AUTOMATED COUNT: 13.9 % (ref 11.5–14.5)
GLOBULIN SER CALC-MCNC: 3.7 G/DL (ref 2–4)
GLUCOSE BLD STRIP.AUTO-MCNC: 88 MG/DL (ref 65–117)
GLUCOSE SERPL-MCNC: 92 MG/DL (ref 65–100)
GLUCOSE UR STRIP.AUTO-MCNC: NEGATIVE MG/DL
HCT VFR BLD AUTO: 44.1 % (ref 36.6–50.3)
HGB BLD-MCNC: 15.5 G/DL (ref 12.1–17)
HGB UR QL STRIP: NEGATIVE
IMM GRANULOCYTES # BLD AUTO: 0 K/UL (ref 0–0.04)
IMM GRANULOCYTES NFR BLD AUTO: 1 % (ref 0–0.5)
KETONES UR QL STRIP.AUTO: NEGATIVE MG/DL
LACTATE SERPL-SCNC: 0.6 MMOL/L (ref 0.4–2)
LEUKOCYTE ESTERASE UR QL STRIP.AUTO: NEGATIVE
LYMPHOCYTES # BLD: 1.6 K/UL (ref 0.8–3.5)
LYMPHOCYTES NFR BLD: 18 % (ref 12–49)
MCH RBC QN AUTO: 33.3 PG (ref 26–34)
MCHC RBC AUTO-ENTMCNC: 35.1 G/DL (ref 30–36.5)
MCV RBC AUTO: 94.6 FL (ref 80–99)
MONOCYTES # BLD: 0.7 K/UL (ref 0–1)
MONOCYTES NFR BLD: 8 % (ref 5–13)
NEUTS SEG # BLD: 6.1 K/UL (ref 1.8–8)
NEUTS SEG NFR BLD: 70 % (ref 32–75)
NITRITE UR QL STRIP.AUTO: NEGATIVE
NRBC # BLD: 0 K/UL (ref 0–0.01)
NRBC BLD-RTO: 0 PER 100 WBC
PH UR STRIP: 6 (ref 5–8)
PLATELET # BLD AUTO: 236 K/UL (ref 150–400)
PMV BLD AUTO: 10.7 FL (ref 8.9–12.9)
POTASSIUM SERPL-SCNC: 4 MMOL/L (ref 3.5–5.1)
PROT SERPL-MCNC: 7.5 G/DL (ref 6.4–8.2)
PROT UR STRIP-MCNC: NEGATIVE MG/DL
RBC # BLD AUTO: 4.66 M/UL (ref 4.1–5.7)
RBC #/AREA URNS HPF: NORMAL /HPF (ref 0–5)
SERVICE CMNT-IMP: NORMAL
SODIUM SERPL-SCNC: 138 MMOL/L (ref 136–145)
SP GR UR REFRACTOMETRY: 1.01 (ref 1–1.03)
SPECIMEN HOLD: NORMAL
URINE CULTURE IF INDICATED: NORMAL
UROBILINOGEN UR QL STRIP.AUTO: 0.2 EU/DL (ref 0.2–1)
WBC # BLD AUTO: 8.7 K/UL (ref 4.1–11.1)
WBC URNS QL MICRO: NORMAL /HPF (ref 0–4)

## 2024-02-01 PROCEDURE — 85025 COMPLETE CBC W/AUTO DIFF WBC: CPT

## 2024-02-01 PROCEDURE — G0378 HOSPITAL OBSERVATION PER HR: HCPCS

## 2024-02-01 PROCEDURE — 81001 URINALYSIS AUTO W/SCOPE: CPT

## 2024-02-01 PROCEDURE — 96372 THER/PROPH/DIAG INJ SC/IM: CPT

## 2024-02-01 PROCEDURE — 6370000000 HC RX 637 (ALT 250 FOR IP): Performed by: STUDENT IN AN ORGANIZED HEALTH CARE EDUCATION/TRAINING PROGRAM

## 2024-02-01 PROCEDURE — 80053 COMPREHEN METABOLIC PANEL: CPT

## 2024-02-01 PROCEDURE — 70498 CT ANGIOGRAPHY NECK: CPT

## 2024-02-01 PROCEDURE — 6370000000 HC RX 637 (ALT 250 FOR IP): Performed by: EMERGENCY MEDICINE

## 2024-02-01 PROCEDURE — 70551 MRI BRAIN STEM W/O DYE: CPT

## 2024-02-01 PROCEDURE — 6360000002 HC RX W HCPCS: Performed by: EMERGENCY MEDICINE

## 2024-02-01 PROCEDURE — 82962 GLUCOSE BLOOD TEST: CPT

## 2024-02-01 PROCEDURE — 93005 ELECTROCARDIOGRAM TRACING: CPT | Performed by: EMERGENCY MEDICINE

## 2024-02-01 PROCEDURE — APPNB45 APP NON BILLABLE 31-45 MINUTES

## 2024-02-01 PROCEDURE — 83605 ASSAY OF LACTIC ACID: CPT

## 2024-02-01 PROCEDURE — 96374 THER/PROPH/DIAG INJ IV PUSH: CPT

## 2024-02-01 PROCEDURE — 87040 BLOOD CULTURE FOR BACTERIA: CPT

## 2024-02-01 PROCEDURE — 6360000002 HC RX W HCPCS: Performed by: STUDENT IN AN ORGANIZED HEALTH CARE EDUCATION/TRAINING PROGRAM

## 2024-02-01 PROCEDURE — 99285 EMERGENCY DEPT VISIT HI MDM: CPT

## 2024-02-01 PROCEDURE — 70450 CT HEAD/BRAIN W/O DYE: CPT

## 2024-02-01 PROCEDURE — 36415 COLL VENOUS BLD VENIPUNCTURE: CPT

## 2024-02-01 PROCEDURE — 6360000004 HC RX CONTRAST MEDICATION: Performed by: RADIOLOGY

## 2024-02-01 PROCEDURE — 0042T CT BRAIN PERFUSION: CPT

## 2024-02-01 RX ORDER — SODIUM CHLORIDE 9 MG/ML
INJECTION, SOLUTION INTRAVENOUS PRN
Status: DISCONTINUED | OUTPATIENT
Start: 2024-02-01 | End: 2024-02-02 | Stop reason: HOSPADM

## 2024-02-01 RX ORDER — HYDRALAZINE HYDROCHLORIDE 20 MG/ML
10 INJECTION INTRAMUSCULAR; INTRAVENOUS EVERY 6 HOURS PRN
Status: DISCONTINUED | OUTPATIENT
Start: 2024-02-01 | End: 2024-02-02 | Stop reason: HOSPADM

## 2024-02-01 RX ORDER — ATORVASTATIN CALCIUM 10 MG/1
40 TABLET, FILM COATED ORAL NIGHTLY
Status: DISCONTINUED | OUTPATIENT
Start: 2024-02-01 | End: 2024-02-02 | Stop reason: HOSPADM

## 2024-02-01 RX ORDER — ACETAMINOPHEN 325 MG/1
650 TABLET ORAL EVERY 6 HOURS PRN
Status: DISCONTINUED | OUTPATIENT
Start: 2024-02-01 | End: 2024-02-02 | Stop reason: HOSPADM

## 2024-02-01 RX ORDER — ACETAMINOPHEN 650 MG/1
650 SUPPOSITORY RECTAL EVERY 6 HOURS PRN
Status: DISCONTINUED | OUTPATIENT
Start: 2024-02-01 | End: 2024-02-02 | Stop reason: HOSPADM

## 2024-02-01 RX ORDER — SODIUM CHLORIDE 0.9 % (FLUSH) 0.9 %
5-40 SYRINGE (ML) INJECTION PRN
Status: DISCONTINUED | OUTPATIENT
Start: 2024-02-01 | End: 2024-02-02 | Stop reason: HOSPADM

## 2024-02-01 RX ORDER — QUETIAPINE FUMARATE 25 MG/1
50 TABLET, FILM COATED ORAL 2 TIMES DAILY
Status: DISCONTINUED | OUTPATIENT
Start: 2024-02-01 | End: 2024-02-02 | Stop reason: HOSPADM

## 2024-02-01 RX ORDER — AMOXICILLIN AND CLAVULANATE POTASSIUM 875; 125 MG/1; MG/1
1 TABLET, FILM COATED ORAL EVERY 12 HOURS SCHEDULED
Status: DISCONTINUED | OUTPATIENT
Start: 2024-02-01 | End: 2024-02-02 | Stop reason: HOSPADM

## 2024-02-01 RX ORDER — ONDANSETRON 4 MG/1
4 TABLET, ORALLY DISINTEGRATING ORAL EVERY 8 HOURS PRN
Status: DISCONTINUED | OUTPATIENT
Start: 2024-02-01 | End: 2024-02-02 | Stop reason: HOSPADM

## 2024-02-01 RX ORDER — ONDANSETRON 2 MG/ML
4 INJECTION INTRAMUSCULAR; INTRAVENOUS EVERY 6 HOURS PRN
Status: DISCONTINUED | OUTPATIENT
Start: 2024-02-01 | End: 2024-02-02 | Stop reason: HOSPADM

## 2024-02-01 RX ORDER — SODIUM CHLORIDE 0.9 % (FLUSH) 0.9 %
5-40 SYRINGE (ML) INJECTION EVERY 12 HOURS SCHEDULED
Status: DISCONTINUED | OUTPATIENT
Start: 2024-02-01 | End: 2024-02-02 | Stop reason: HOSPADM

## 2024-02-01 RX ORDER — ASPIRIN 300 MG/1
300 SUPPOSITORY RECTAL DAILY
Status: DISCONTINUED | OUTPATIENT
Start: 2024-02-01 | End: 2024-02-02 | Stop reason: HOSPADM

## 2024-02-01 RX ORDER — ENOXAPARIN SODIUM 100 MG/ML
40 INJECTION SUBCUTANEOUS DAILY
Status: DISCONTINUED | OUTPATIENT
Start: 2024-02-01 | End: 2024-02-02 | Stop reason: HOSPADM

## 2024-02-01 RX ORDER — PENICILLIN V POTASSIUM 250 MG/1
500 TABLET ORAL
Status: COMPLETED | OUTPATIENT
Start: 2024-02-01 | End: 2024-02-01

## 2024-02-01 RX ORDER — ASPIRIN 81 MG/1
81 TABLET, CHEWABLE ORAL DAILY
Status: DISCONTINUED | OUTPATIENT
Start: 2024-02-01 | End: 2024-02-02 | Stop reason: HOSPADM

## 2024-02-01 RX ORDER — POLYETHYLENE GLYCOL 3350 17 G/17G
17 POWDER, FOR SOLUTION ORAL DAILY PRN
Status: DISCONTINUED | OUTPATIENT
Start: 2024-02-01 | End: 2024-02-02 | Stop reason: HOSPADM

## 2024-02-01 RX ORDER — TAMSULOSIN HYDROCHLORIDE 0.4 MG/1
0.8 CAPSULE ORAL DAILY
Status: DISCONTINUED | OUTPATIENT
Start: 2024-02-01 | End: 2024-02-02 | Stop reason: HOSPADM

## 2024-02-01 RX ORDER — MORPHINE SULFATE 2 MG/ML
2 INJECTION, SOLUTION INTRAMUSCULAR; INTRAVENOUS ONCE
Status: COMPLETED | OUTPATIENT
Start: 2024-02-01 | End: 2024-02-01

## 2024-02-01 RX ADMIN — MORPHINE SULFATE 2 MG: 2 INJECTION, SOLUTION INTRAMUSCULAR; INTRAVENOUS at 12:22

## 2024-02-01 RX ADMIN — QUETIAPINE FUMARATE 50 MG: 25 TABLET ORAL at 20:13

## 2024-02-01 RX ADMIN — IOPAMIDOL 80 ML: 755 INJECTION, SOLUTION INTRAVENOUS at 12:04

## 2024-02-01 RX ADMIN — ASPIRIN 81 MG: 81 TABLET, CHEWABLE ORAL at 20:14

## 2024-02-01 RX ADMIN — ATORVASTATIN CALCIUM 40 MG: 10 TABLET, FILM COATED ORAL at 21:06

## 2024-02-01 RX ADMIN — PENICILLIN V POTASSIUM 500 MG: 250 TABLET, FILM COATED ORAL at 15:20

## 2024-02-01 RX ADMIN — AMOXICILLIN AND CLAVULANATE POTASSIUM 1 TABLET: 875; 125 TABLET, FILM COATED ORAL at 20:14

## 2024-02-01 RX ADMIN — IOPAMIDOL 40 ML: 755 INJECTION, SOLUTION INTRAVENOUS at 11:58

## 2024-02-01 RX ADMIN — TAMSULOSIN HYDROCHLORIDE 0.8 MG: 0.4 CAPSULE ORAL at 20:14

## 2024-02-01 RX ADMIN — ENOXAPARIN SODIUM 40 MG: 100 INJECTION SUBCUTANEOUS at 20:14

## 2024-02-01 ASSESSMENT — PAIN - FUNCTIONAL ASSESSMENT: PAIN_FUNCTIONAL_ASSESSMENT: ACTIVITIES ARE NOT PREVENTED

## 2024-02-01 ASSESSMENT — PAIN SCALES - GENERAL
PAINLEVEL_OUTOF10: 10
PAINLEVEL_OUTOF10: 0
PAINLEVEL_OUTOF10: 0

## 2024-02-01 ASSESSMENT — PAIN DESCRIPTION - LOCATION: LOCATION: JAW

## 2024-02-01 ASSESSMENT — PAIN DESCRIPTION - ORIENTATION: ORIENTATION: RIGHT

## 2024-02-01 NOTE — H&P
History and Physical    Date of Service:  2/1/2024  Primary Care Provider: Yara Moraes MD  Source of information: The patient, Chart review, and Spouse/family member    Chief Complaint: Tremors      History of Presenting Illness:   Bradford Virgen is a 73 y.o. male who presents with AMS and tremors.    This is a 73-year-old  male with past medical history of anxiety, BPH, early onset dementia, sinus disease, TONYA, white matter disease of brain, tremors, who comes in for the above.  Patient was apparently at the dentist today for a cleaning.  At the dentist office he was also told that he had a periodontal infection and was prescribed antibiotics.  Upon leaving, the patient had an episode of severe tremors of upper extremities and confusion.  Per his wife, he does have episodes of tremors sometimes, and has advancing dementia, but he is never had an episode like this.  No loss of consciousness, no incontinence, no lower extremity tremors at the time.  The patient's wife does report that he has had an increase of which she says are episodes of him staring off into space.  He was apparently unable to speak and continue to tremor and did not follow commands so his wife brought him to the ER.  On arrival to the ER, mental status came back to baseline.  Teleneurology saw the patient as initially the patient was admitted as a code stroke.  Patient denies any pain or discomfort at this time.    The patient denies any headache, blurry vision, sore throat, trouble swallowing, trouble with speech, chest pain, SOB, cough, fever, chills, N/V/D, abd pain, urinary symptoms, constipation, recent travels, sick contacts, focal or generalized neurological symptoms, falls, injuries, rashes, contact with COVID-19 diagnosed patients, hematemesis, melena, hemoptysis, hematuria, rashes, denies starting any new medications and denies any other concerns or problems besides as mentioned above.         REVIEW OF  held with appropriate clinical/nonclinical/ nursing providers based on care coordination needs.     Assessment:   Given the patient's current clinical presentation, there is a high level of concern for decompensation if discharged from the emergency department. Complex decision making was performed, which includes reviewing the patient's available past medical records, laboratory results, and imaging studies.    Principal Problem:    AMS (altered mental status)  Resolved Problems:    * No resolved hospital problems. *      Plan:     Altered mental status  Tremors  -Some concern for CVA versus TIA  -Patient does have a history of TIA  -Does not sound like a seizure  -Teleneurology saw the patient and recommended MRI and stroke workup  -Will hold off on neurology consult until we have an MRI  -Will hold off on EEG for now as this does not sound like a seizure  -Unclear cause and the fact that the patient has advanced dementia makes the clinical picture very difficult to make out  -Lipid panel  -A1c  -PT and OT consult  -Admit to neuro floor  -Aspirin  -Statin  -MRI of brain.  CT and CTAs were negative  -Permissive hypertension for now  -Neurochecks  -Consider neurology consult for tomorrow and EEG if needed    Advanced dementia with behavioral disturbances apparently  Anxiety  -Resume Seroquel  -Holding donepezil for now due to interaction with Seroquel  -Monitor    Periodontal infection  -Was prescribed amoxicillin at his dentist  -Received penicillin V here  -Will continue with Augmentin  -No signs of systemic infection at this time    BPH  -Continue Flomax        DIET: ADULT DIET; Regular   ISOLATION PRECAUTIONS: No active isolations  CODE STATUS: [unfilled]   DVT PROPHYLAXIS: Lovenox  FUNCTIONAL STATUS PRIOR TO HOSPITALIZATION: Capable of only limited self-care; confined to bed or chair likely more than 50% of waking hours.  Ambulatory status/function: By self   EARLY MOBILITY ASSESSMENT: Recommend an assessment from  physical therapy and/or occupational therapy  ANTICIPATED DISCHARGE: 24-48 hours.  ANTICIPATED DISPOSITION: Home  EMERGENCY CONTACT/SURROGATE DECISION MAKER: Spouse    CRITICAL CARE WAS PERFORMED FOR THIS ENCOUNTER: NO.      Signed By: Lg Guardado MD     February 1, 2024         Please note that this dictation may have been completed with Dragon, the computer voice recognition software.  Quite often unanticipated grammatical, syntax, homophones, and other interpretive errors are inadvertently transcribed by the computer software.  Please disregard these errors.  Please excuse any errors that have escaped final proofreading.

## 2024-02-01 NOTE — ED PROVIDER NOTES
[facility] EMERGENCY DEP  EMERGENCY DEPARTMENT ENCOUNTER      Pt Name: Bradford Virgen  MRN: [id number]  Birthdate 1950  Date of evaluation: 2/1/2024  Provider: Cruzito Montez MD    CHIEF COMPLAINT       Chief Complaint   Patient presents with    Tremors         HISTORY OF PRESENT ILLNESS   (Location/Symptom, Timing/Onset, Context/Setting, Quality, Duration, Modifying Factors, Severity)  Note limiting factors.   Mr. Virgen presents to the ER with complaints of a change in his mental status.  He was at his normal state of health this morning when he got up.  He went to the dentist for a regular cleaning.  The dentist told the patient's wife that he had some signs of infection on the roof of his mouth.  When he went to leave, he was not at his same mental status baseline.  He has baseline significant dementia.  He is able to walk around on his own and shower on his own.  However, he is not able to maintain meaningful conversation.  He has been leaning over and tremoring on the right side.  He was unable to speak much to his wife.  He would not follow commands.  Therefore, he was brought to the emergency room.  Patient denies being any pain.  He said that he does not feel well but is unable to say further what is bothering him.      Patient's history is limited by his baseline mental status.  History was obtained with the patient's wife.            Review of External Medical Records:     Nursing Notes were reviewed.    REVIEW OF SYSTEMS    (2-9 systems for level 4, 10 or more for level 5)     Review of Systems   Unable to perform ROS: Dementia   Neurological:  Positive for tremors.   Psychiatric/Behavioral:  Positive for agitation.        Except as noted above the remainder of the review of systems was reviewed and negative.       PAST MEDICAL HISTORY     Past Medical History:   Diagnosis Date    Allergic rhinitis, cause unspecified     Anxiety     Anxiety     Anxiety disorder 7/2/2010    AR (allergic rhinitis)  film images such as CT, Ultrasound and MRI are read by the radiologist. Plain radiographic images are visualized and preliminarily interpreted by the emergency physician with the below findings:        Interpretation per the Radiologist below, if available at the time of this note:    CT HEAD WO CONTRAST   Final Result   No acute abnormality.            CTA HEAD NECK W CONTRAST    (Results Pending)        LABS:  Labs Reviewed   CBC WITH AUTO DIFFERENTIAL   COMPREHENSIVE METABOLIC PANEL   EXTRA TUBES HOLD   POCT GLUCOSE   POCT GLUCOSE       All other labs were within normal range or not returned as of this dictation.    EMERGENCY DEPARTMENT COURSE and DIFFERENTIAL DIAGNOSIS/MDM:   Vitals:    Vitals:    02/01/24 1125   BP: (!) 158/91   Pulse: 88   Resp: 18   Temp: 97 °F (36.1 °C)   TempSrc: Temporal   SpO2: 96%   Height: 1.829 m (6')           Medical Decision Making  Amount and/or Complexity of Data Reviewed  Independent Historian: spouse  External Data Reviewed: notes.  Labs: ordered.  Radiology: ordered.  ECG/medicine tests: ordered.    Risk  Prescription drug management.  Decision regarding hospitalization.        1:50PM   I reevaluated the patient's mouth.  Looks like he has an infected molar in the back left.  His uvula last a second last molar.  Both have dental caries.  The gums at the base of these teeth are erythematous and show signs of infection.  No signs of abscess.  Will need antibiotics.        Perfect Serve Consult for Admission  2:15 PM    ED Room Number: ER03/03  Patient Name and age:  Bradford Virgen 73 y.o.  male  Working Diagnosis:   1. Altered mental status, unspecified altered mental status type    2. Tremor        COVID-19 Suspicion: No  Sepsis present:  No  Reassessment needed: No  Readmission: No  Isolation Requirements: no  Recommended Level of Care: telemetry  Department: [facility] Adult ED - [phone]  Consulting Provider: seen by teleneuro.  Recommend admission for MRI/stroke work-up.  No

## 2024-02-01 NOTE — ED NOTES
11:17 AM  I have evaluated the patient as the Provider in Rapid Medical Evaluation (RME). I have reviewed his vital signs and the triage nurse assessment. I have talked with the patient and any available family and advised that I am the provider in triage and have ordered the appropriate study to initiate their work up based on the clinical presentation during my assessment. I have advised that the patient will be accommodated in the Main ED as soon as possible. I have also requested to contact the triage nurse or myself immediately if the patient experiences any changes in their condition during this brief waiting period.    73 y.o. male presents to ED with tremors and confusion. Patient reportedly has history of advanced dementia. Patient arrives with wife who reports that just prior to arrival he went to a dentist for a dental infection and routine cleaning. No medications were administered while in the dentist. While walking out of the dental appointment patient started shaking and acting confused. Patient has history of strokes before, typically is independent and able to respond to questions. Patient is oriented to self but not place or time. Not able to follow commands at this time, Code S level 1 called.     PARIS GALARZA Ashley, PA  02/01/24 1122

## 2024-02-01 NOTE — PROGRESS NOTES
Patient was able to eat his dinner at bedside with wife [place] [facility] diet is suitable passed swallow screen

## 2024-02-01 NOTE — PROGRESS NOTES
Neurocritical Care Code Stroke Documentation      Symptoms:  Acute change in mental status, not communicating like normal   Baseline mRS:   4   Last Known Well:  2024 10:00 am prior to dentist visit   Medical hx: Past Medical History:   Diagnosis Date    Allergic rhinitis, cause unspecified     Anxiety     Anxiety     Anxiety disorder 2010    AR (allergic rhinitis) 2010    BPH (benign prostatic hyperplasia)     Burning with urination     Degenerative arthritis of lumbar spine     Dementia (HCC)     Depression     Eustachian tube dysfunction     Ill-defined condition     WHITE MATTER DISEASE    Insomnia due to other mental disorder 2021    Mild cognitive impairment 2021    Obstructive sleep apnea syndrome 2021    CPAP    Right inguinal hernia 5/3/2016    White matter abnormality on MRI of brain       Anticoagulation:  None   VAN:   Negative   NIHSS:   1a-LOC:0    1b-Month/Age:2    1c-Open/Close Hand:1    2-Best Gaze:0    3-Visual Fields:0    4-Facial Palsy:0    5a-Left Arm:2    5b-Right Arm:0    6a-Left Le    6b-Right Le    7-Limb Ataxia:0    8-Sensory:0    9-Best Language:2    10-Dysarthria:0    11-Extinction/Inattention:0  TOTAL SCORE: 7   Imaging:   CT: No acute process, chronic calcification right parietal lobe    CTA: No LVO or flow limiting stenosis.    CTP: artifact present due to patient tremors, but there does not appear to be a significant perfusion deficit.   Plan:   TNK Candidate: NO    Mechanical thrombectomy Candidate: NO     *Perform dysphagia screening prior to any PO intake*    Discussed with: Wife, Dr. Marline Tineo MD Teleneuro, Primary nurse, patient, patient's wife    Patient has dementia and tremors.  He went to dentist this morning and they were looking at a tooth on the right upper side that appears broken for potential infection.  During the exam the patient had an extreme change in his baseline mental status per wife where he would not respond to her

## 2024-02-02 VITALS
WEIGHT: 182 LBS | SYSTOLIC BLOOD PRESSURE: 112 MMHG | TEMPERATURE: 97.7 F | BODY MASS INDEX: 24.65 KG/M2 | DIASTOLIC BLOOD PRESSURE: 85 MMHG | HEIGHT: 72 IN | RESPIRATION RATE: 18 BRPM | OXYGEN SATURATION: 95 % | HEART RATE: 65 BPM

## 2024-02-02 PROBLEM — R41.82 AMS (ALTERED MENTAL STATUS): Status: RESOLVED | Noted: 2024-02-01 | Resolved: 2024-02-02

## 2024-02-02 LAB
ANION GAP SERPL CALC-SCNC: 6 MMOL/L (ref 5–15)
BASOPHILS # BLD: 0.1 K/UL (ref 0–0.1)
BASOPHILS NFR BLD: 1 % (ref 0–1)
BUN SERPL-MCNC: 15 MG/DL (ref 6–20)
BUN/CREAT SERPL: 17 (ref 12–20)
CALCIUM SERPL-MCNC: 7.8 MG/DL (ref 8.5–10.1)
CHLORIDE SERPL-SCNC: 113 MMOL/L (ref 97–108)
CHOLEST SERPL-MCNC: 155 MG/DL
CO2 SERPL-SCNC: 23 MMOL/L (ref 21–32)
CREAT SERPL-MCNC: 0.9 MG/DL (ref 0.7–1.3)
DIFFERENTIAL METHOD BLD: NORMAL
EOSINOPHIL # BLD: 0.2 K/UL (ref 0–0.4)
EOSINOPHIL NFR BLD: 3 % (ref 0–7)
ERYTHROCYTE [DISTWIDTH] IN BLOOD BY AUTOMATED COUNT: 13.7 % (ref 11.5–14.5)
EST. AVERAGE GLUCOSE BLD GHB EST-MCNC: 105 MG/DL
GLUCOSE SERPL-MCNC: 82 MG/DL (ref 65–100)
HBA1C MFR BLD: 5.3 % (ref 4–5.6)
HCT VFR BLD AUTO: 41.6 % (ref 36.6–50.3)
HDLC SERPL-MCNC: 59 MG/DL
HDLC SERPL: 2.6 (ref 0–5)
HGB BLD-MCNC: 14.6 G/DL (ref 12.1–17)
IMM GRANULOCYTES # BLD AUTO: 0 K/UL (ref 0–0.04)
IMM GRANULOCYTES NFR BLD AUTO: 0 % (ref 0–0.5)
LDLC SERPL CALC-MCNC: 86 MG/DL (ref 0–100)
LYMPHOCYTES # BLD: 1.8 K/UL (ref 0.8–3.5)
LYMPHOCYTES NFR BLD: 27 % (ref 12–49)
MCH RBC QN AUTO: 32.5 PG (ref 26–34)
MCHC RBC AUTO-ENTMCNC: 35.1 G/DL (ref 30–36.5)
MCV RBC AUTO: 92.7 FL (ref 80–99)
MONOCYTES # BLD: 0.6 K/UL (ref 0–1)
MONOCYTES NFR BLD: 9 % (ref 5–13)
NEUTS SEG # BLD: 4.2 K/UL (ref 1.8–8)
NEUTS SEG NFR BLD: 60 % (ref 32–75)
NRBC # BLD: 0 K/UL (ref 0–0.01)
NRBC BLD-RTO: 0 PER 100 WBC
PLATELET # BLD AUTO: 201 K/UL (ref 150–400)
PMV BLD AUTO: 10.6 FL (ref 8.9–12.9)
POTASSIUM SERPL-SCNC: 3.9 MMOL/L (ref 3.5–5.1)
RBC # BLD AUTO: 4.49 M/UL (ref 4.1–5.7)
SODIUM SERPL-SCNC: 142 MMOL/L (ref 136–145)
TRIGL SERPL-MCNC: 50 MG/DL
VLDLC SERPL CALC-MCNC: 10 MG/DL
WBC # BLD AUTO: 6.9 K/UL (ref 4.1–11.1)

## 2024-02-02 PROCEDURE — G0378 HOSPITAL OBSERVATION PER HR: HCPCS

## 2024-02-02 PROCEDURE — 83036 HEMOGLOBIN GLYCOSYLATED A1C: CPT

## 2024-02-02 PROCEDURE — 36415 COLL VENOUS BLD VENIPUNCTURE: CPT

## 2024-02-02 PROCEDURE — 80048 BASIC METABOLIC PNL TOTAL CA: CPT

## 2024-02-02 PROCEDURE — 6360000002 HC RX W HCPCS: Performed by: STUDENT IN AN ORGANIZED HEALTH CARE EDUCATION/TRAINING PROGRAM

## 2024-02-02 PROCEDURE — 97116 GAIT TRAINING THERAPY: CPT

## 2024-02-02 PROCEDURE — 2580000003 HC RX 258: Performed by: STUDENT IN AN ORGANIZED HEALTH CARE EDUCATION/TRAINING PROGRAM

## 2024-02-02 PROCEDURE — 99223 1ST HOSP IP/OBS HIGH 75: CPT | Performed by: NURSE PRACTITIONER

## 2024-02-02 PROCEDURE — 6370000000 HC RX 637 (ALT 250 FOR IP): Performed by: STUDENT IN AN ORGANIZED HEALTH CARE EDUCATION/TRAINING PROGRAM

## 2024-02-02 PROCEDURE — 96372 THER/PROPH/DIAG INJ SC/IM: CPT

## 2024-02-02 PROCEDURE — 80061 LIPID PANEL: CPT

## 2024-02-02 PROCEDURE — 97161 PT EVAL LOW COMPLEX 20 MIN: CPT

## 2024-02-02 PROCEDURE — 92610 EVALUATE SWALLOWING FUNCTION: CPT

## 2024-02-02 PROCEDURE — 85025 COMPLETE CBC W/AUTO DIFF WBC: CPT

## 2024-02-02 PROCEDURE — 97165 OT EVAL LOW COMPLEX 30 MIN: CPT | Performed by: OCCUPATIONAL THERAPIST

## 2024-02-02 RX ORDER — DONEPEZIL HYDROCHLORIDE 5 MG/1
10 TABLET, FILM COATED ORAL NIGHTLY
Status: DISCONTINUED | OUTPATIENT
Start: 2024-02-02 | End: 2024-02-02 | Stop reason: HOSPADM

## 2024-02-02 RX ADMIN — AMOXICILLIN AND CLAVULANATE POTASSIUM 1 TABLET: 875; 125 TABLET, FILM COATED ORAL at 09:01

## 2024-02-02 RX ADMIN — TAMSULOSIN HYDROCHLORIDE 0.8 MG: 0.4 CAPSULE ORAL at 09:00

## 2024-02-02 RX ADMIN — ENOXAPARIN SODIUM 40 MG: 100 INJECTION SUBCUTANEOUS at 09:00

## 2024-02-02 RX ADMIN — SODIUM CHLORIDE, PRESERVATIVE FREE 10 ML: 5 INJECTION INTRAVENOUS at 09:02

## 2024-02-02 RX ADMIN — QUETIAPINE FUMARATE 50 MG: 25 TABLET ORAL at 09:00

## 2024-02-02 RX ADMIN — SODIUM CHLORIDE, PRESERVATIVE FREE 10 ML: 5 INJECTION INTRAVENOUS at 09:01

## 2024-02-02 RX ADMIN — ASPIRIN 81 MG: 81 TABLET, CHEWABLE ORAL at 09:01

## 2024-02-02 ASSESSMENT — PAIN SCALES - GENERAL: PAINLEVEL_OUTOF10: 0

## 2024-02-02 NOTE — PROGRESS NOTES
PHYSICAL THERAPY EVALUATION/DISCHARGE    Patient: Bradford Virgen (73 y.o. male)  Date: 2/2/2024  Primary Diagnosis: AMS (altered mental status) [R41.82]       Precautions: Fall Risk                      ASSESSMENT AND RECOMMENDATIONS:  Based on the objective data below, the patient is likely at his functional baseline.  He is Mod I to Supv w/ ADLs and mobility with a RW.  He ambulated around the ER with the RW with steady gait w/ no safety concerns other than his memory impairment in unfamiliar surroundings.  Noted pt presented to the ER w/ increased tremors and AMS. He continues to be altered though able to follow commands with prompting and cues.  Noted mild tremors at rest in the bed improve when mobilizing.     Functional Outcome Measure:  The patient scored 20/24 on the [facility] outcome measure.      Further skilled acute physical therapy is not indicated at this time.       PLAN :  Recommendation for discharge: (in order for the patient to meet his/her long term goals): No skilled physical therapy    Other factors to consider for discharge: impaired cognition    IF patient discharges home will need the following DME: patient owns DME required for discharge       SUBJECTIVE:   Patient cognitively impaired, does not initiate conversation though will respond to commands by completing the task.    OBJECTIVE DATA SUMMARY:     Past Medical History:   Diagnosis Date    Allergic rhinitis, cause unspecified     Anxiety     Anxiety     Anxiety disorder 7/2/2010    AR (allergic rhinitis) 7/2/2010    BPH (benign prostatic hyperplasia)     Burning with urination     Degenerative arthritis of lumbar spine 9/12    Dementia (HCC)     Depression     Eustachian tube dysfunction     Ill-defined condition     WHITE MATTER DISEASE    Insomnia due to other mental disorder 5/19/2021    Mild cognitive impairment 5/19/2021    Obstructive sleep apnea syndrome 5/19/2021    CPAP    Right inguinal hernia 5/3/2016    White matter abnormality  on MRI of brain      Past Surgical History:   Procedure Laterality Date    APPENDECTOMY  1953         COLONOSCOPY  2007    repeat 2014    GI      COLONOSCOPY    HERNIA REPAIR  2/85    KNEE ARTHROSCOPY  6/02    ORTHOPEDIC SURGERY  10/75    knee repair    TONSILLECTOMY      AS A CHILD    TOTAL KNEE ARTHROPLASTY Right 02/2021    TOTAL KNEE ARTHROPLASTY Left 03/29/2022       Home Situation and Prior Level of Function:   Social/Functional History  Lives With: Spouse  Type of Home: House  Home Layout: Two level, Able to Live on Main level with bedroom/bathroom  Bathroom Shower/Tub: Walk-in shower  Home Equipment: Walker, rolling  Has the patient had two or more falls in the past year or any fall with injury in the past year?: Unknown  Receives Help From: Family  ADL Assistance: Independent  Ambulation Assistance: Independent (RW)  Transfer Assistance: Independent (RW)  Critical Behavior:  Orientation  Overall Orientation Status: Impaired  Orientation Level: Oriented to place;Oriented to person;Disoriented to time;Disoriented to situation  Cognition  Overall Cognitive Status: Exceptions  Following Commands: Follows one step commands consistently  Attention Span: Attends with cues to redirect;Difficulty dividing attention  Memory: Decreased short term memory;Decreased recall of recent events  Safety Judgement: Decreased awareness of need for assistance;Decreased awareness of need for safety  Problem Solving: Assistance required to identify errors made;Assistance required to generate solutions  Insights: Decreased awareness of deficits  Initiation: Requires cues for some  Sequencing: Does not require cues    Hearing:   Hearing  Hearing: Exceptions to WFL  Hearing Exceptions: Hard of hearing/hearing concerns    Vision/Perceptual:          Vision  Vision: Within Functional Limits  Perception  Overall Perceptual Status: WFL    Strength:    Strength: Within functional limits    Tone & Sensation:   Observed mild tremors at rest  meds      COMMUNICATION/EDUCATION:   The patient's plan of care was discussed with: occupational therapist, registered nurse, and     Patient Education  Education Given To: Patient  Education Provided: Role of Therapy;Plan of Care;Fall Prevention Strategies  Education Method: Verbal  Barriers to Learning: Cognition  Education Outcome: Unable to demonstrate understanding    Thank you for this referral.  Alpa Connors, PT  Minutes: 35      Physical Therapy Evaluation Charge Determination   History Examination Presentation Decision-Making   MEDIUM  Complexity : 1-2 comorbidities / personal factors will impact the outcome/ POC  LOW Complexity : 1-2 Standardized tests and measures addressing body structure, function, activity limitation and / or participation in recreation  LOW Complexity : Stable, uncomplicated  AM-PAC  LOW      Based on the above components, the patient evaluation is determined to be of the following complexity level: Medium

## 2024-02-02 NOTE — PROGRESS NOTES
OCCUPATIONAL THERAPY EVALUATION/DISCHARGE  Patient: Bradford Virgen (73 y.o. male)  Date: 2/2/2024  Primary Diagnosis: AMS (altered mental status) [R41.82]         Precautions: Fall Risk                  ASSESSMENT :  Based on the objective data below, the patient presents at an overall mod I to supervision level withy ADLs and functional mobility amb with RW.  Pt with no LOB.  Pt with baseline dementia, is cognitively impaired, and has tremors at baseline.  Based on above, pt is at his functional baseline and no further skilled OT is required at this time.  Recommend supervision for safety due to impaired cognition.    Functional Outcome Measure:  The patient scored 65/100 on the Barthel Index outcome measure.      Further skilled acute occupational therapy is not indicated at this time.     PLAN :  Recommend with staff: up with supervision and RW    Recommendation for discharge: (in order for the patient to meet his/her long term goals): No skilled occupational therapy    Other factors to consider for discharge: impaired cognition and concern for safely navigating or managing the home environment    IF patient discharges home will need the following DME: patient owns DME required for discharge     SUBJECTIVE:   Patient stated, “1950.”    OBJECTIVE DATA SUMMARY:     Past Medical History:   Diagnosis Date    Allergic rhinitis, cause unspecified     Anxiety     Anxiety     Anxiety disorder 7/2/2010    AR (allergic rhinitis) 7/2/2010    BPH (benign prostatic hyperplasia)     Burning with urination     Degenerative arthritis of lumbar spine 9/12    Dementia (HCC)     Depression     Eustachian tube dysfunction     Ill-defined condition     WHITE MATTER DISEASE    Insomnia due to other mental disorder 5/19/2021    Mild cognitive impairment 5/19/2021    Obstructive sleep apnea syndrome 5/19/2021    CPAP    Right inguinal hernia 5/3/2016    White matter abnormality on MRI of brain      Past Surgical History:  least half of task within reasonable time  Bowel Control: No accidents. Able to use enema or suppository if needed  Bladder Control: Occasional accidents or needs help with device  Toilet Transfers: Needs help for balance, handling clothes or toilet paper  Chair/Bed Trannsfers: Minimum assistance or supervision required  Ambulation: With help for 50 yards  Stairs: Needs help or supervision  Total Barthel Index Score: 65       The Barthel ADL Index: Guidelines  1. The index should be used as a record of what a patient does, not as a record of what a patient could do.  2. The main aim is to establish degree of independence from any help, physical or verbal, however minor and for whatever reason.  3. The need for supervision renders the patient not independent.  4. A patient's performance should be established using the best available evidence. Asking the patient, friends/relatives and nurses are the usual sources, but direct observation and common sense are also important. However direct testing is not needed.  5. Usually the patient's performance over the preceding 24-48 hours is important, but occasionally longer periods will be relevant.  6. Middle categories imply that the patient supplies over 50 per cent of the effort.  7. Use of aids to be independent is allowed.    Score Interpretation (from Oscar 1997)    Independent   60-79 Minimally independent   40-59 Partially dependent   20-39 Very dependent   <20 Totally dependent     -Bennett Jimenez., Barthel, D.W. (1965). Functional evaluation: the Barthel Index. Md State Med J 142.  -HOME Moore, LELE Combs (1997). The Barthel activities of daily living index: self-reporting versus actual performance in the old (> or = 75 years). Journal of American Geriatric Society 45(7), 832-836.   -SALINA BangF, ALIYAH Groves., Saad, JDebbieA., Jackson, A.J. (1999). Measuring the change in disability after inpatient rehabilitation; comparison of the responsiveness of

## 2024-02-02 NOTE — DISCHARGE SUMMARY
Discharge Summary       PATIENT ID: Bradford Virgen  MRN: [id number]   YOB: 1950    DATE OF ADMISSION: 2/1/2024 11:42 AM    DATE OF DISCHARGE: 2/2/24   PRIMARY CARE PROVIDER: Yara Moraes MD     ATTENDING PHYSICIAN: Lg Guardado MD  DISCHARGING PROVIDER: Lg Guardado MD    To contact this individual call [phone] and ask the  to page.  If unavailable ask to be transferred the Adult Hospitalist Department.    CONSULTATIONS: IP CONSULT TO NEUROLOGY  IP CONSULT TO PSYCHIATRY    PROCEDURES/SURGERIES: * No surgery found *    ADMITTING DIAGNOSES & HOSPITAL COURSE:   This is a 73-year-old  male with past medical history of anxiety, BPH, early onset dementia, sinus disease, TONYA, white matter disease of brain, tremors, who comes in for the above.  Patient was apparently at the dentist today for a cleaning.  At the dentist office he was also told that he had a periodontal infection and was prescribed antibiotics.  Upon leaving, the patient had an episode of severe tremors of upper extremities and confusion.  Per his wife, he does have episodes of tremors sometimes, and has advancing dementia, but he is never had an episode like this.  No loss of consciousness, no incontinence, no lower extremity tremors at the time.  The patient's wife does report that he has had an increase of which she says are episodes of him staring off into space.  He was apparently unable to speak and continue to tremor and did not follow commands so his wife brought him to the ER.  On arrival to the ER, mental status came back to baseline.  Teleneurology saw the patient as initially the patient was admitted as a code stroke.  Patient denies any pain or discomfort at this time.     Altered mental status  Tremors  -Some concern for CVA versus TIA  -Patient does have a history of TIA  -Does not sound like a seizure  -Teleneurology saw the patient and recommended MRI and stroke

## 2024-02-02 NOTE — CARE COORDINATION
02/02/24 1629   Service Assessment   Patient Orientation Person   Cognition Dementia / Early Alzheimer's   History Provided By Spouse   Primary Caregiver Spouse   Accompanied By/Relationship Wife Liza Virgen and SOn Donn Virgen   Support Systems Spouse/Significant Other;Children;Family Members;Friends/Neighbors  ([facility] ADult day Services program staff)   Patient's Healthcare Decision Maker is: Named in Scanned ACP Document   PCP Verified by CM Yes   Last Visit to PCP Within last 3 months   Prior Functional Level Assistance with the following:;Cooking;Housework;Shopping   Current Functional Level Assistance with the following:;Cooking;Housework;Shopping   Can patient return to prior living arrangement Yes   Ability to make needs known: Poor   Family able to assist with home care needs: Yes   Would you like for me to discuss the discharge plan with any other family members/significant others, and if so, who? Yes  (Wife Liza [phone])   Financial Resources Medicare   Community Resources Other (Comment)  (Adult Day Program)   CM/SW Referral Emotional support   Social/Functional History   Lives With Spouse   Type of Home House   Home Layout Multi-level   Bathroom Equipment Grab bars in shower     CM met with pt's wife at request of bedside nurse. Wife requested to speak to CM about resources for dementia caregivers.   Wife verified demographics, Pt independent of ADLs, minus cooking, housework, shopping. Pt wears Sina ankle tracker due to risk of elopement. Pt has intermittent episodes of aggression with wife and adult day program staff.     Wife has many supports in place and has extensive knowledge of caregiver and dementia pt support resources. Pt is scheduled for 5 days week adult day program at [facility]. Wife utilizes multiple support groups and has support from son and dil. Wife requested to talk to CM to see if there are any free of charge insurance provided or
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
## 2024-02-02 NOTE — CARE COORDINATION
15:20 Bedside RN called this CM to request bedside visit to family. Family requesting resources for dementia services/placement.     Sisi Will, AMRIT

## 2024-02-02 NOTE — PROGRESS NOTES
Speech LAnguage Pathology EVALUATION/DISCHARGE    Patient: Bradford Virgen (73 y.o. male)  Date: 2/2/2024  Primary Diagnosis: AMS (altered mental status) [R41.82]    Precautions:  Fall Risk                  ASSESSMENT :  Patient presents with grossly functional oropharyngeal swallow based on bedside assessment. Timely and efficient oral phase of swallow for consistencies assessed. No overt s/s of aspiration including with system stress via sequential boluses. Voice and motor speech perceived to be WFL. Recommend PO diet of regular texture with thin liquids. MRI Brain negative for acute intracranial process. Patient does have history of dementia but per patient/son at bedside, his swallowing has not been an issue and he has good appetite/intake.     Patient will be discharged from skilled speech-language pathology services at this time.     PLAN :  Recommendations and Planned Interventions:  Diet: Regular and thin liquids  Oral medications whole with liquids/as tolerated  Upright for PO intake  Routine oral care    Acute SLP Services: No, patient will be discharged from acute skilled speech-language pathology at this time.    Discharge Recommendations: Continue to assess pending progress     SUBJECTIVE:   Patient stated, “Bradford.”    OBJECTIVE:     Past Medical History:   Diagnosis Date    Allergic rhinitis, cause unspecified     Anxiety     Anxiety     Anxiety disorder 7/2/2010    AR (allergic rhinitis) 7/2/2010    BPH (benign prostatic hyperplasia)     Burning with urination     Degenerative arthritis of lumbar spine 9/12    Dementia (HCC)     Depression     Eustachian tube dysfunction     Ill-defined condition     WHITE MATTER DISEASE    Insomnia due to other mental disorder 5/19/2021    Mild cognitive impairment 5/19/2021    Obstructive sleep apnea syndrome 5/19/2021    CPAP    Right inguinal hernia 5/3/2016    White matter abnormality on MRI of brain      Past Surgical History:   Procedure Laterality Date     7--Total oral diet with no restrictions    After treatment:   Patient left in no apparent distress in bed, Call bell left within reach, Nursing notified, and Caregiver present (patient's son)    COMMUNICATION/EDUCATION:   The patient's plan of care including recommendations, planned interventions were discussed with: Registered nurse    Patient/family have participated as able in goal setting and plan of care    Thank you,  Ary Fish SLP  Minutes: 15

## 2024-02-02 NOTE — PROGRESS NOTES
0900 Patient restless frequently getting out of the bed to walk the hallways. Patient not redirectable back to room, walks a loop around the hallway and then back to room. Provider notified.

## 2024-02-02 NOTE — PROGRESS NOTES
Orders received, chart reviewed and patient evaluated and discharged by physical therapy. Recommend:  No skilled physical therapy    Recommend with nursing OOB to chair 3x/day and walking daily with supervision assist (d/t memory impairment) and rolling walker. Thank you for completing as able in order to maintain patient strength, endurance and independence.     Full evaluation to follow.
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
## 2024-02-04 LAB
BACTERIA SPEC CULT: NORMAL
BACTERIA SPEC CULT: NORMAL
SERVICE CMNT-IMP: NORMAL
SERVICE CMNT-IMP: NORMAL

## 2024-02-05 ENCOUNTER — TELEPHONE (OUTPATIENT)
Age: 74
End: 2024-02-05

## 2024-02-05 NOTE — TELEPHONE ENCOUNTER
Care Transitions Initial Follow Up Call    Outreach made within 2 business days of discharge: Yes    Patient: Bradford Virgen Patient : 1950   MRN: [id number]  Reason for Admission: Altered Mental Status    Discharge Date: 24       Spoke with: NATALIE    Discharge department/facility: [facility] Interactive Patient Contact:  Scheduled appointment with PCP within 7-14 days    Follow Up  Future Appointments   Date Time Provider Department Center   4/15/2024  1:00 PM James Boucher MD [facility] Phyllis G Owusu, RN

## 2024-02-09 ENCOUNTER — OFFICE VISIT (OUTPATIENT)
Age: 74
End: 2024-02-09

## 2024-02-09 VITALS
RESPIRATION RATE: 14 BRPM | HEIGHT: 72 IN | TEMPERATURE: 97.7 F | DIASTOLIC BLOOD PRESSURE: 72 MMHG | HEART RATE: 96 BPM | WEIGHT: 184.2 LBS | OXYGEN SATURATION: 96 % | SYSTOLIC BLOOD PRESSURE: 128 MMHG | BODY MASS INDEX: 24.95 KG/M2

## 2024-02-09 DIAGNOSIS — Z09 HOSPITAL DISCHARGE FOLLOW-UP: Primary | ICD-10-CM

## 2024-02-09 DIAGNOSIS — F03.B0 MODERATE DEMENTIA, UNSPECIFIED DEMENTIA TYPE, UNSPECIFIED WHETHER BEHAVIORAL, PSYCHOTIC, OR MOOD DISTURBANCE OR ANXIETY (HCC): ICD-10-CM

## 2024-02-09 RX ORDER — TRAZODONE HYDROCHLORIDE 50 MG/1
50 TABLET ORAL NIGHTLY
COMMUNITY

## 2024-02-09 ASSESSMENT — PATIENT HEALTH QUESTIONNAIRE - PHQ9
1. LITTLE INTEREST OR PLEASURE IN DOING THINGS: 0
SUM OF ALL RESPONSES TO PHQ QUESTIONS 1-9: 0
SUM OF ALL RESPONSES TO PHQ9 QUESTIONS 1 & 2: 0
2. FEELING DOWN, DEPRESSED OR HOPELESS: 0

## 2024-02-09 NOTE — PROGRESS NOTES
Chief Complaint   Patient presents with    Follow-Up from Hospital     \"Have you been to the ER, urgent care clinic since your last visit?  Hospitalized since your last visit?\"    Yes. [facility] on 2/1/24 for mental status.     “Have you seen or consulted any other health care providers outside of [facility] since your last visit?”    NO                Financial Resource Strain: Low Risk  (5/23/2023)    Overall Financial Resource Strain (CARDIA)     Difficulty of Paying Living Expenses: Not hard at all      Food Insecurity: Not on file (5/23/2023)            2/9/2024    10:55 AM   PHQ-9    Little interest or pleasure in doing things 0   Feeling down, depressed, or hopeless 0   PHQ-2 Score 0   PHQ-9 Total Score 0       Health Maintenance Due   Topic Date Due    Respiratory Syncytial Virus (RSV) Pregnant or age 60 yrs+ (1 - 1-dose 60+ series) Never done    Pneumococcal 65+ years Vaccine (2 - PCV) 10/03/2015    DTaP/Tdap/Td vaccine (2 - Td or Tdap) 09/18/2019    Flu vaccine (1) 08/01/2023    COVID-19 Vaccine (3 - 2023-24 season) 09/01/2023
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
## 2024-02-13 ENCOUNTER — TELEPHONE (OUTPATIENT)
Age: 74
End: 2024-02-13

## 2024-02-13 NOTE — TELEPHONE ENCOUNTER
Mouna with [facility] is calling stating they faxed over paper work for PCP to fill out, and they need the paper work filled out and sent back ASAP because the wife has him scheduled to move in later this week.  BCB: [phone]  Fax#: [phone]

## 2024-03-13 DIAGNOSIS — R41.82 ALTERED MENTAL STATUS, UNSPECIFIED ALTERED MENTAL STATUS TYPE: Primary | ICD-10-CM

## 2024-04-01 NOTE — TELEPHONE ENCOUNTER
MD Moraes,    [facility] stating patient is requesting refill of trazodone 50mg.     Pharmacy comments:  patient requests new rx: Pt needs renewal, last rx was cancelled electronically; Pls send new rx, patient did not elect to stay in memory care, is still at home.    Andra Lugo    Last appointment: 2/9/24 Aleisha  Next appointment: None  Previous refill encounter(s): 11/24/23 90 + 1 (d/c'd by MD Daniel Guardado at discharge 2/2/24)    Requested Prescriptions     Pending Prescriptions Disp Refills    traZODone (DESYREL) 50 MG tablet 90 tablet 1     Sig: Take 1 tablet by mouth nightly     For Pharmacy Admin Tracking Only    Program: Medication Refill  CPA in place:    Recommendation Provided To:   Intervention Detail: New Rx: 1, reason: Patient Preference  Intervention Accepted By:   Gap Closed?:    Time Spent (min): 10

## 2024-04-02 RX ORDER — TRAZODONE HYDROCHLORIDE 50 MG/1
50 TABLET ORAL NIGHTLY
Qty: 90 TABLET | Refills: 1 | Status: SHIPPED | OUTPATIENT
Start: 2024-04-02

## 2024-05-26 ENCOUNTER — HOSPITAL ENCOUNTER (EMERGENCY)
Facility: HOSPITAL | Age: 74
Discharge: HOME | End: 2024-05-26
Attending: EMERGENCY MEDICINE
Payer: MEDICARE

## 2024-05-26 VITALS
HEIGHT: 72 IN | RESPIRATION RATE: 10 BRPM | OXYGEN SATURATION: 97 % | TEMPERATURE: 98.9 F | SYSTOLIC BLOOD PRESSURE: 115 MMHG | DIASTOLIC BLOOD PRESSURE: 69 MMHG | WEIGHT: 170.5 LBS | HEART RATE: 67 BPM | BODY MASS INDEX: 23.09 KG/M2

## 2024-05-26 DIAGNOSIS — T67.5XXA HEAT EXHAUSTION, INITIAL ENCOUNTER: Primary | ICD-10-CM

## 2024-05-26 LAB
ALBUMIN SERPL-MCNC: 3.4 G/DL (ref 3.5–5)
ALBUMIN/GLOB SERPL: 1 (ref 1.1–2.2)
ALP SERPL-CCNC: 78 U/L (ref 45–117)
ALT SERPL-CCNC: 18 U/L (ref 12–78)
ANION GAP SERPL CALC-SCNC: 4 MMOL/L (ref 5–15)
AST SERPL-CCNC: 15 U/L (ref 15–37)
BASOPHILS # BLD: 0.1 K/UL (ref 0–0.1)
BASOPHILS NFR BLD: 0 % (ref 0–1)
BILIRUB SERPL-MCNC: 0.5 MG/DL (ref 0.2–1)
BUN SERPL-MCNC: 13 MG/DL (ref 6–20)
BUN/CREAT SERPL: 10 (ref 12–20)
CALCIUM SERPL-MCNC: 8.8 MG/DL (ref 8.5–10.1)
CHLORIDE SERPL-SCNC: 108 MMOL/L (ref 97–108)
CO2 SERPL-SCNC: 27 MMOL/L (ref 21–32)
COMMENT:: NORMAL
CREAT SERPL-MCNC: 1.29 MG/DL (ref 0.7–1.3)
DIFFERENTIAL METHOD BLD: ABNORMAL
EOSINOPHIL # BLD: 0.1 K/UL (ref 0–0.4)
EOSINOPHIL NFR BLD: 1 % (ref 0–7)
ERYTHROCYTE [DISTWIDTH] IN BLOOD BY AUTOMATED COUNT: 13.2 % (ref 11.5–14.5)
GLOBULIN SER CALC-MCNC: 3.4 G/DL (ref 2–4)
GLUCOSE SERPL-MCNC: 113 MG/DL (ref 65–100)
HCT VFR BLD AUTO: 45.1 % (ref 36.6–50.3)
HGB BLD-MCNC: 14.8 G/DL (ref 12.1–17)
IMM GRANULOCYTES # BLD AUTO: 0 K/UL (ref 0–0.04)
IMM GRANULOCYTES NFR BLD AUTO: 0 % (ref 0–0.5)
LYMPHOCYTES # BLD: 0.9 K/UL (ref 0.8–3.5)
LYMPHOCYTES NFR BLD: 8 % (ref 12–49)
MAGNESIUM SERPL-MCNC: 2.1 MG/DL (ref 1.6–2.4)
MCH RBC QN AUTO: 31.3 PG (ref 26–34)
MCHC RBC AUTO-ENTMCNC: 32.8 G/DL (ref 30–36.5)
MCV RBC AUTO: 95.3 FL (ref 80–99)
MONOCYTES # BLD: 0.9 K/UL (ref 0–1)
MONOCYTES NFR BLD: 7 % (ref 5–13)
NEUTS SEG # BLD: 10 K/UL (ref 1.8–8)
NEUTS SEG NFR BLD: 84 % (ref 32–75)
NRBC # BLD: 0 K/UL (ref 0–0.01)
NRBC BLD-RTO: 0 PER 100 WBC
PLATELET # BLD AUTO: 228 K/UL (ref 150–400)
PMV BLD AUTO: 10.8 FL (ref 8.9–12.9)
POTASSIUM SERPL-SCNC: 3.4 MMOL/L (ref 3.5–5.1)
PROT SERPL-MCNC: 6.8 G/DL (ref 6.4–8.2)
RBC # BLD AUTO: 4.73 M/UL (ref 4.1–5.7)
SODIUM SERPL-SCNC: 139 MMOL/L (ref 136–145)
SPECIMEN HOLD: NORMAL
WBC # BLD AUTO: 12 K/UL (ref 4.1–11.1)

## 2024-05-26 PROCEDURE — 85025 COMPLETE CBC W/AUTO DIFF WBC: CPT

## 2024-05-26 PROCEDURE — 96360 HYDRATION IV INFUSION INIT: CPT

## 2024-05-26 PROCEDURE — 83735 ASSAY OF MAGNESIUM: CPT

## 2024-05-26 PROCEDURE — 2580000003 HC RX 258: Performed by: EMERGENCY MEDICINE

## 2024-05-26 PROCEDURE — 99284 EMERGENCY DEPT VISIT MOD MDM: CPT

## 2024-05-26 PROCEDURE — 36415 COLL VENOUS BLD VENIPUNCTURE: CPT

## 2024-05-26 PROCEDURE — 80053 COMPREHEN METABOLIC PANEL: CPT

## 2024-05-26 RX ORDER — 0.9 % SODIUM CHLORIDE 0.9 %
1000 INTRAVENOUS SOLUTION INTRAVENOUS ONCE
Status: COMPLETED | OUTPATIENT
Start: 2024-05-26 | End: 2024-05-26

## 2024-05-26 RX ADMIN — SODIUM CHLORIDE 1000 ML: 9 INJECTION, SOLUTION INTRAVENOUS at 14:30

## 2024-05-26 ASSESSMENT — LIFESTYLE VARIABLES
HOW MANY STANDARD DRINKS CONTAINING ALCOHOL DO YOU HAVE ON A TYPICAL DAY: PATIENT DOES NOT DRINK
HOW OFTEN DO YOU HAVE A DRINK CONTAINING ALCOHOL: NEVER

## 2024-05-26 ASSESSMENT — PAIN SCALES - GENERAL: PAINLEVEL_OUTOF10: 0

## 2024-05-26 NOTE — ED TRIAGE NOTES
Triage note: Pt ,arrives via EMS from home for syncopal event while gardening. Pt has hx of dementia and is poor historian. EMS reports pt was initially tachycardic but that resolved after a liter of NS. Pt still unsteady on feet which is abnormal so transported to ED. Pt is alert and oriented at baseline on arrival. VSS.

## 2024-05-26 NOTE — ED PROVIDER NOTES
[facility] EMERGENCY DEP  EMERGENCY DEPARTMENT ENCOUNTER      Pt Name: Bradford Virgen  MRN: [id number]  Birthdate 1950  Date of evaluation: 5/26/2024  Provider: Hugh Garza MD    CHIEF COMPLAINT       Chief Complaint   Patient presents with    Loss of Consciousness         HISTORY OF PRESENT ILLNESS   (Location/Symptom, Timing/Onset, Context/Setting, Quality, Duration, Modifying Factors, Severity)  Note limiting factors.   74-year-old male with dementia and now altered mental status while overheated.  Patient was outdoors and gardening with his wife and very hard environmental conditions.  He was noted to have trouble with dizziness and inability to walk well.  EMS was called and gave him 1 L of IV fluids but noted he continued to be off balance.  They bring him for evaluation of his dizziness in light of the dehydration and overheating.  Patient did not completely lose consciousness.  He declines any pain throughout this.  Mental status is at his baseline the entire time according to his wife.  No fall or injury.  No focal neurologic signs.    The history is provided by the patient.   Dizziness  Quality:  Head spinning and lightheadedness  Severity:  Moderate  Onset quality:  Gradual  Duration:  2 hours  Timing:  Constant  Progression:  Improving  Chronicity:  New  Context: physical activity    Context: not when bending over, not with bowel movement, not with ear pain, not with eye movement, not with head movement, not with inactivity, not with loss of consciousness, not with medication, not when standing up and not when urinating    Relieved by:  Nothing  Worsened by:  Nothing  Ineffective treatments:  None tried  Associated symptoms: no blood in stool, no chest pain, no diarrhea, no headaches, no nausea, no palpitations, no shortness of breath, no tinnitus, no vision changes and no vomiting          Review of External Medical Records:     Nursing Notes were reviewed.    REVIEW OF SYSTEMS    (2-9 systems

## 2024-05-26 NOTE — ED NOTES
Patient is a 74-year-old male signed out to me by Dr. Garza to pending IV fluid completion p.o. challenge and ambulation trial. Wife at bedside to take patient home.  Patient ambulating with steady gait at time of reevaluation, able to tolerate p.o.  Safe for discharge home with PCP follow-up next week.    MD Boni Fields Amrita, MD  05/26/24 8550

## 2024-05-26 NOTE — ED NOTES
Reviewed discharge instructions with patient. All questions answered. IV removed.     Patient-Reported Vitals  Vitals:    05/26/24 1409 05/26/24 1415 05/26/24 1430 05/26/24 1500   BP: (!) 111/59 109/62 (!) 111/59 115/69   Pulse: 76 80 72 67   Resp: 11 10     Temp: 98.9 °F (37.2 °C)      TempSrc: Oral      SpO2: 94% 94% 94% 97%   Weight: 77.3 kg (170 lb 8 oz)      Height: 1.829 m (6')

## 2024-05-30 ENCOUNTER — ANCILLARY PROCEDURE (OUTPATIENT)
Age: 74
End: 2024-05-30
Payer: MEDICARE

## 2024-05-30 ENCOUNTER — OFFICE VISIT (OUTPATIENT)
Age: 74
End: 2024-05-30
Payer: MEDICARE

## 2024-05-30 VITALS
WEIGHT: 189.2 LBS | RESPIRATION RATE: 16 BRPM | BODY MASS INDEX: 25.63 KG/M2 | DIASTOLIC BLOOD PRESSURE: 70 MMHG | HEIGHT: 72 IN | OXYGEN SATURATION: 96 % | TEMPERATURE: 98.6 F | HEART RATE: 90 BPM | SYSTOLIC BLOOD PRESSURE: 122 MMHG

## 2024-05-30 DIAGNOSIS — M79.641 RIGHT HAND PAIN: ICD-10-CM

## 2024-05-30 DIAGNOSIS — M25.531 RIGHT WRIST PAIN: ICD-10-CM

## 2024-05-30 DIAGNOSIS — T67.01XD HEAT STROKE, SUBSEQUENT ENCOUNTER: ICD-10-CM

## 2024-05-30 DIAGNOSIS — Z00.00 ENCOUNTER FOR MEDICARE ANNUAL WELLNESS EXAM: Primary | ICD-10-CM

## 2024-05-30 PROCEDURE — 99214 OFFICE O/P EST MOD 30 MIN: CPT | Performed by: FAMILY MEDICINE

## 2024-05-30 PROCEDURE — 73130 X-RAY EXAM OF HAND: CPT

## 2024-05-30 PROCEDURE — 73110 X-RAY EXAM OF WRIST: CPT

## 2024-05-30 RX ORDER — COLCHICINE 0.6 MG/1
TABLET ORAL
Qty: 30 TABLET | Refills: 0 | Status: SHIPPED | OUTPATIENT
Start: 2024-05-30

## 2024-05-30 SDOH — ECONOMIC STABILITY: FOOD INSECURITY: WITHIN THE PAST 12 MONTHS, THE FOOD YOU BOUGHT JUST DIDN'T LAST AND YOU DIDN'T HAVE MONEY TO GET MORE.: NEVER TRUE

## 2024-05-30 SDOH — ECONOMIC STABILITY: FOOD INSECURITY: WITHIN THE PAST 12 MONTHS, YOU WORRIED THAT YOUR FOOD WOULD RUN OUT BEFORE YOU GOT MONEY TO BUY MORE.: NEVER TRUE

## 2024-05-30 SDOH — ECONOMIC STABILITY: INCOME INSECURITY: HOW HARD IS IT FOR YOU TO PAY FOR THE VERY BASICS LIKE FOOD, HOUSING, MEDICAL CARE, AND HEATING?: NOT HARD AT ALL

## 2024-05-30 ASSESSMENT — ENCOUNTER SYMPTOMS
NAUSEA: 0
DIARRHEA: 0
CHEST TIGHTNESS: 0
VOMITING: 0
ABDOMINAL PAIN: 0
WHEEZING: 0
CONSTIPATION: 0
COUGH: 0
SHORTNESS OF BREATH: 0

## 2024-05-30 ASSESSMENT — PATIENT HEALTH QUESTIONNAIRE - PHQ9
2. FEELING DOWN, DEPRESSED OR HOPELESS: NOT AT ALL
SUM OF ALL RESPONSES TO PHQ QUESTIONS 1-9: 0
1. LITTLE INTEREST OR PLEASURE IN DOING THINGS: NOT AT ALL
SUM OF ALL RESPONSES TO PHQ9 QUESTIONS 1 & 2: 0
SUM OF ALL RESPONSES TO PHQ QUESTIONS 1-9: 0

## 2024-05-30 NOTE — PROGRESS NOTES
Chief Complaint   Patient presents with    Follow-up     DE follow up 5/26    Arm Pain     Right arm pain, about 3 days    Medicare AWV     \"Have you been to the ER, urgent care clinic since your last visit?  Hospitalized since your last visit?\"    Yes. 5/26/24 - [facility]- Passed out.     “Have you seen or consulted any other health care providers outside of [facility] since your last visit?”    NO                Financial Resource Strain: Low Risk  (5/30/2024)    Overall Financial Resource Strain (CARDIA)     Difficulty of Paying Living Expenses: Not hard at all      Food Insecurity: No Food Insecurity (5/30/2024)    Hunger Vital Sign     Worried About Running Out of Food in the Last Year: Never true     Ran Out of Food in the Last Year: Never true            5/30/2024     3:23 PM   PHQ-9    Little interest or pleasure in doing things 0   Feeling down, depressed, or hopeless 0   PHQ-2 Score 0   PHQ-9 Total Score 0       Health Maintenance Due   Topic Date Due    Respiratory Syncytial Virus (RSV) Pregnant or age 60 yrs+ (1 - 1-dose 60+ series) Never done    Pneumococcal 65+ years Vaccine (2 of 2 - PCV) 10/03/2015    DTaP/Tdap/Td vaccine (2 - Td or Tdap) 09/18/2019    COVID-19 Vaccine (3 - 2023-24 season) 09/01/2023    Annual Wellness Visit (Medicare)  03/31/2024             
     Right Ear: External ear normal.      Left Ear: External ear normal.      Nose: Nose normal.      Mouth/Throat:      Mouth: Mucous membranes are moist.   Eyes:      General: Lids are normal.      Extraocular Movements: Extraocular movements intact.      Conjunctiva/sclera: Conjunctivae normal.      Pupils: Pupils are equal, round, and reactive to light.   Pulmonary:      Effort: Pulmonary effort is normal. No tachypnea, bradypnea, accessory muscle usage or respiratory distress.   Musculoskeletal:      Right wrist: Swelling and tenderness (along radial wrist) present. Decreased range of motion (limited movement due to pain).      Right hand: Swelling and tenderness (pain along base of 1st MTP) present.   Skin:     General: Skin is warm and dry.      Findings: No rash.   Neurological:      General: No focal deficit present.      Mental Status: He is alert and oriented to person, place, and time. Mental status is at baseline.         Treatment risks/benefits/costs/interactions/alternatives discussed with patient.  Advised patient to call back or return to office if symptoms worsen/change/persist. If patient cannot reach us or should anything more severe/urgent arise he/she should proceed directly to the nearest emergency department.  Discussed expected course/resolution/complications of diagnosis in detail with patient.  Patient expressed understanding with the diagnosis and plan.     This dictation may have been completed with Dragon, the computer voice recognition software.  Unanticipated grammatical, syntax, homophones, and other interpretive errors are sometimes inadvertently transcribed by the computer software.  Please disregard any errors that have escaped final proofreading.      Yara Moraes M.D.

## 2024-05-31 LAB
ANION GAP SERPL CALC-SCNC: 5 MMOL/L (ref 5–15)
BUN SERPL-MCNC: 15 MG/DL (ref 6–20)
BUN/CREAT SERPL: 12 (ref 12–20)
CALCIUM SERPL-MCNC: 9.2 MG/DL (ref 8.5–10.1)
CHLORIDE SERPL-SCNC: 106 MMOL/L (ref 97–108)
CO2 SERPL-SCNC: 29 MMOL/L (ref 21–32)
CREAT SERPL-MCNC: 1.21 MG/DL (ref 0.7–1.3)
GLUCOSE SERPL-MCNC: 95 MG/DL (ref 65–100)
POTASSIUM SERPL-SCNC: 4 MMOL/L (ref 3.5–5.1)
SODIUM SERPL-SCNC: 140 MMOL/L (ref 136–145)
URATE SERPL-MCNC: 3.7 MG/DL (ref 3.5–7.2)

## 2024-06-05 ENCOUNTER — TELEPHONE (OUTPATIENT)
Age: 74
End: 2024-06-05

## 2024-06-05 RX ORDER — ALLOPURINOL 100 MG/1
100 TABLET ORAL DAILY
Qty: 90 TABLET | Refills: 1 | Status: SHIPPED | OUTPATIENT
Start: 2024-06-05

## 2024-06-05 NOTE — TELEPHONE ENCOUNTER
Patients wife Liza called stating that she is hoping to get a call back in regards to the lab and x-ray results. Patient is unable to answer the phone at the moment, as he is on home hospice.    Best call back number  [phone]

## 2024-06-05 NOTE — TELEPHONE ENCOUNTER
Called patient's wife. Two patient identifiers verified. Discussed lab results per provider's note. Verbalized understanding.

## 2024-07-28 DIAGNOSIS — G31.84 MILD COGNITIVE IMPAIRMENT OF UNCERTAIN OR UNKNOWN ETIOLOGY: ICD-10-CM

## 2024-07-28 RX ORDER — DONEPEZIL HYDROCHLORIDE 10 MG/1
TABLET, FILM COATED ORAL
Qty: 90 TABLET | Refills: 1 | Status: SHIPPED | OUTPATIENT
Start: 2024-07-28

## 2024-07-30 PROBLEM — Z51.5 HOSPICE CARE PATIENT: Status: ACTIVE | Noted: 2024-01-01

## (undated) DEVICE — BLADELESS OBTURATOR: Brand: WECK VISTA

## (undated) DEVICE — NEEDLE HYPO 22GA L1.5IN BLK S STL HUB POLYPR SHLD REG BVL

## (undated) DEVICE — COVER MPLR TIP CRV SCIS ACC DA VINCI

## (undated) DEVICE — PAD PT POS 36 IN SURGYPAD DISP

## (undated) DEVICE — SUTURE SZ 0 27IN 5/8 CIR UR-6  TAPER PT VIOLET ABSRB VICRYL J603H

## (undated) DEVICE — ARM DRAPE

## (undated) DEVICE — SUT ETHBND 2-0 36IN SH DA GRN --

## (undated) DEVICE — VISUALIZATION SYSTEM: Brand: CLEARIFY

## (undated) DEVICE — SUTURE VCRL SZ 3-0 L27IN ABSRB UD L26MM SH 1/2 CIR J416H

## (undated) DEVICE — SUTURE DEV SZ 3-0 V-LOC 90 L12IN TO L18IN CV-23 VLT VLOCM0844

## (undated) DEVICE — SOLUTION IRRIG 1000ML 0.9% SOD CHL USP POUR PLAS BTL

## (undated) DEVICE — GOWN,SIRUS,NONRNF,SETINSLV,XL,20/CS: Brand: MEDLINE

## (undated) DEVICE — GARMENT,MEDLINE,DVT,INT,CALF,MED, GEN2: Brand: MEDLINE

## (undated) DEVICE — DERMABOND SKIN ADH 0.7ML -- DERMABOND ADVANCED 12/BX

## (undated) DEVICE — HANDLE LT SNAP ON ULT DURABLE LENS FOR TRUMPF ALC DISPOSABLE

## (undated) DEVICE — Device

## (undated) DEVICE — GENERAL LAPAROSCOPY - SMH: Brand: MEDLINE INDUSTRIES, INC.

## (undated) DEVICE — SUTURE MCRYL SZ 4-0 L27IN ABSRB UD L19MM PS-2 1/2 CIR PRIM Y426H

## (undated) DEVICE — SEAL UNIV 5-8MM DISP BX/10 -- DA VINCI XI - SNGL USE